# Patient Record
Sex: FEMALE | Race: WHITE | NOT HISPANIC OR LATINO | Employment: OTHER | URBAN - METROPOLITAN AREA
[De-identification: names, ages, dates, MRNs, and addresses within clinical notes are randomized per-mention and may not be internally consistent; named-entity substitution may affect disease eponyms.]

---

## 2017-10-11 ENCOUNTER — TRANSFERRED RECORDS (OUTPATIENT)
Dept: HEALTH INFORMATION MANAGEMENT | Facility: CLINIC | Age: 82
End: 2017-10-11

## 2018-01-02 ENCOUNTER — HOSPITAL LABORATORY (OUTPATIENT)
Dept: OTHER | Facility: CLINIC | Age: 83
End: 2018-01-02

## 2018-01-02 LAB — HGB BLD-MCNC: 14.1 G/DL (ref 11.7–15.7)

## 2018-01-05 ENCOUNTER — TRANSFERRED RECORDS (OUTPATIENT)
Dept: HEALTH INFORMATION MANAGEMENT | Facility: CLINIC | Age: 83
End: 2018-01-05

## 2018-01-08 ENCOUNTER — HOSPITAL ENCOUNTER (OUTPATIENT)
Dept: LAB | Facility: CLINIC | Age: 83
End: 2018-01-08
Attending: ORTHOPAEDIC SURGERY
Payer: MEDICARE

## 2018-01-30 ENCOUNTER — HOSPITAL ENCOUNTER (INPATIENT)
Facility: CLINIC | Age: 83
LOS: 3 days | Discharge: NURSING, REHAB OR LONG-TERM CARE | DRG: 470 | End: 2018-02-02
Attending: ORTHOPAEDIC SURGERY | Admitting: ORTHOPAEDIC SURGERY
Payer: MEDICARE

## 2018-01-30 ENCOUNTER — APPOINTMENT (OUTPATIENT)
Dept: PHYSICAL THERAPY | Facility: CLINIC | Age: 83
DRG: 470 | End: 2018-01-30
Attending: ORTHOPAEDIC SURGERY
Payer: MEDICARE

## 2018-01-30 ENCOUNTER — ANESTHESIA (OUTPATIENT)
Dept: SURGERY | Facility: CLINIC | Age: 83
DRG: 470 | End: 2018-01-30
Payer: MEDICARE

## 2018-01-30 ENCOUNTER — ANESTHESIA EVENT (OUTPATIENT)
Dept: SURGERY | Facility: CLINIC | Age: 83
DRG: 470 | End: 2018-01-30
Payer: MEDICARE

## 2018-01-30 DIAGNOSIS — Z96.652 STATUS POST TOTAL LEFT KNEE REPLACEMENT: Primary | ICD-10-CM

## 2018-01-30 PROBLEM — Z96.659 TOTAL KNEE REPLACEMENT STATUS: Status: ACTIVE | Noted: 2018-01-30

## 2018-01-30 LAB
CREAT SERPL-MCNC: 0.65 MG/DL (ref 0.52–1.04)
GFR SERPL CREATININE-BSD FRML MDRD: 86 ML/MIN/1.7M2
POTASSIUM SERPL-SCNC: 3.4 MMOL/L (ref 3.4–5.3)

## 2018-01-30 PROCEDURE — 36415 COLL VENOUS BLD VENIPUNCTURE: CPT | Performed by: SURGERY

## 2018-01-30 PROCEDURE — 27810169 ZZH OR IMPLANT GENERAL: Performed by: ORTHOPAEDIC SURGERY

## 2018-01-30 PROCEDURE — 25000128 H RX IP 250 OP 636: Performed by: SURGERY

## 2018-01-30 PROCEDURE — 25000128 H RX IP 250 OP 636: Performed by: NURSE ANESTHETIST, CERTIFIED REGISTERED

## 2018-01-30 PROCEDURE — 25000566 ZZH SEVOFLURANE, EA 15 MIN: Performed by: ORTHOPAEDIC SURGERY

## 2018-01-30 PROCEDURE — 25800025 ZZH RX 258: Performed by: ORTHOPAEDIC SURGERY

## 2018-01-30 PROCEDURE — 37000009 ZZH ANESTHESIA TECHNICAL FEE, EACH ADDTL 15 MIN: Performed by: ORTHOPAEDIC SURGERY

## 2018-01-30 PROCEDURE — 27210995 ZZH RX 272: Performed by: ORTHOPAEDIC SURGERY

## 2018-01-30 PROCEDURE — 71000013 ZZH RECOVERY PHASE 1 LEVEL 1 EA ADDTL HR: Performed by: ORTHOPAEDIC SURGERY

## 2018-01-30 PROCEDURE — 25000125 ZZHC RX 250: Performed by: SURGERY

## 2018-01-30 PROCEDURE — 97530 THERAPEUTIC ACTIVITIES: CPT | Mod: GP

## 2018-01-30 PROCEDURE — 97161 PT EVAL LOW COMPLEX 20 MIN: CPT | Mod: GP

## 2018-01-30 PROCEDURE — 27110028 ZZH OR GENERAL SUPPLY NON-STERILE: Performed by: ORTHOPAEDIC SURGERY

## 2018-01-30 PROCEDURE — 37000008 ZZH ANESTHESIA TECHNICAL FEE, 1ST 30 MIN: Performed by: ORTHOPAEDIC SURGERY

## 2018-01-30 PROCEDURE — 27210794 ZZH OR GENERAL SUPPLY STERILE: Performed by: ORTHOPAEDIC SURGERY

## 2018-01-30 PROCEDURE — 25000125 ZZHC RX 250: Performed by: NURSE ANESTHETIST, CERTIFIED REGISTERED

## 2018-01-30 PROCEDURE — 40000193 ZZH STATISTIC PT WARD VISIT

## 2018-01-30 PROCEDURE — 25000125 ZZHC RX 250: Performed by: ORTHOPAEDIC SURGERY

## 2018-01-30 PROCEDURE — 25000128 H RX IP 250 OP 636: Performed by: ORTHOPAEDIC SURGERY

## 2018-01-30 PROCEDURE — 27210995 ZZH RX 272: Performed by: PHYSICIAN ASSISTANT

## 2018-01-30 PROCEDURE — 36000093 ZZH SURGERY LEVEL 4 1ST 30 MIN: Performed by: ORTHOPAEDIC SURGERY

## 2018-01-30 PROCEDURE — 0SRD0J9 REPLACEMENT OF LEFT KNEE JOINT WITH SYNTHETIC SUBSTITUTE, CEMENTED, OPEN APPROACH: ICD-10-PCS | Performed by: ORTHOPAEDIC SURGERY

## 2018-01-30 PROCEDURE — 84132 ASSAY OF SERUM POTASSIUM: CPT | Performed by: SURGERY

## 2018-01-30 PROCEDURE — 25000128 H RX IP 250 OP 636: Performed by: ANESTHESIOLOGY

## 2018-01-30 PROCEDURE — C1776 JOINT DEVICE (IMPLANTABLE): HCPCS | Performed by: ORTHOPAEDIC SURGERY

## 2018-01-30 PROCEDURE — 25000128 H RX IP 250 OP 636: Performed by: PHYSICIAN ASSISTANT

## 2018-01-30 PROCEDURE — 25000132 ZZH RX MED GY IP 250 OP 250 PS 637: Mod: GY | Performed by: PHYSICIAN ASSISTANT

## 2018-01-30 PROCEDURE — 82565 ASSAY OF CREATININE: CPT | Performed by: SURGERY

## 2018-01-30 PROCEDURE — 36000063 ZZH SURGERY LEVEL 4 EA 15 ADDTL MIN: Performed by: ORTHOPAEDIC SURGERY

## 2018-01-30 PROCEDURE — A9270 NON-COVERED ITEM OR SERVICE: HCPCS | Mod: GY | Performed by: PHYSICIAN ASSISTANT

## 2018-01-30 PROCEDURE — 40000170 ZZH STATISTIC PRE-PROCEDURE ASSESSMENT II: Performed by: ORTHOPAEDIC SURGERY

## 2018-01-30 PROCEDURE — 97110 THERAPEUTIC EXERCISES: CPT | Mod: GP

## 2018-01-30 PROCEDURE — 71000012 ZZH RECOVERY PHASE 1 LEVEL 1 FIRST HR: Performed by: ORTHOPAEDIC SURGERY

## 2018-01-30 PROCEDURE — P9041 ALBUMIN (HUMAN),5%, 50ML: HCPCS | Performed by: NURSE ANESTHETIST, CERTIFIED REGISTERED

## 2018-01-30 PROCEDURE — 12000007 ZZH R&B INTERMEDIATE

## 2018-01-30 DEVICE — BONE CEMENT SIMPLEX W/TOBRAMYCIN 6197-9-001: Type: IMPLANTABLE DEVICE | Site: KNEE | Status: FUNCTIONAL

## 2018-01-30 DEVICE — BONE CEMENT SIMPLEX 1/2 DOSE 6188-1-001: Type: IMPLANTABLE DEVICE | Site: KNEE | Status: FUNCTIONAL

## 2018-01-30 DEVICE — IMPLANTABLE DEVICE: Type: IMPLANTABLE DEVICE | Site: KNEE | Status: FUNCTIONAL

## 2018-01-30 DEVICE — IMP TIB BASE JJ ATTUNE RP SZ4 CEM 150610004: Type: IMPLANTABLE DEVICE | Site: KNEE | Status: FUNCTIONAL

## 2018-01-30 RX ORDER — SODIUM CHLORIDE, SODIUM LACTATE, POTASSIUM CHLORIDE, CALCIUM CHLORIDE 600; 310; 30; 20 MG/100ML; MG/100ML; MG/100ML; MG/100ML
INJECTION, SOLUTION INTRAVENOUS CONTINUOUS
Status: CANCELLED | OUTPATIENT
Start: 2018-01-30

## 2018-01-30 RX ORDER — LIDOCAINE HYDROCHLORIDE 20 MG/ML
INJECTION, SOLUTION INFILTRATION; PERINEURAL PRN
Status: DISCONTINUED | OUTPATIENT
Start: 2018-01-30 | End: 2018-01-30

## 2018-01-30 RX ORDER — SODIUM CHLORIDE, SODIUM LACTATE, POTASSIUM CHLORIDE, CALCIUM CHLORIDE 600; 310; 30; 20 MG/100ML; MG/100ML; MG/100ML; MG/100ML
INJECTION, SOLUTION INTRAVENOUS CONTINUOUS
Status: DISCONTINUED | OUTPATIENT
Start: 2018-01-30 | End: 2018-01-30 | Stop reason: HOSPADM

## 2018-01-30 RX ORDER — CEFAZOLIN SODIUM 1 G
1 VIAL (EA) INJECTION EVERY 8 HOURS
Status: COMPLETED | OUTPATIENT
Start: 2018-01-30 | End: 2018-01-31

## 2018-01-30 RX ORDER — ACETAMINOPHEN 325 MG/1
650 TABLET ORAL EVERY 4 HOURS PRN
Status: DISCONTINUED | OUTPATIENT
Start: 2018-02-02 | End: 2018-02-02 | Stop reason: HOSPADM

## 2018-01-30 RX ORDER — ONDANSETRON 2 MG/ML
4 INJECTION INTRAMUSCULAR; INTRAVENOUS EVERY 30 MIN PRN
Status: CANCELLED | OUTPATIENT
Start: 2018-01-30

## 2018-01-30 RX ORDER — CEFAZOLIN SODIUM 1 G
1 VIAL (EA) INJECTION SEE ADMIN INSTRUCTIONS
Status: DISCONTINUED | OUTPATIENT
Start: 2018-01-30 | End: 2018-01-30 | Stop reason: HOSPADM

## 2018-01-30 RX ORDER — FENTANYL CITRATE 50 UG/ML
25-50 INJECTION, SOLUTION INTRAMUSCULAR; INTRAVENOUS
Status: CANCELLED | OUTPATIENT
Start: 2018-01-30

## 2018-01-30 RX ORDER — ACETAMINOPHEN 325 MG/1
975 TABLET ORAL EVERY 8 HOURS
Status: DISPENSED | OUTPATIENT
Start: 2018-01-30 | End: 2018-02-02

## 2018-01-30 RX ORDER — DEXAMETHASONE SODIUM PHOSPHATE 4 MG/ML
INJECTION, SOLUTION INTRA-ARTICULAR; INTRALESIONAL; INTRAMUSCULAR; INTRAVENOUS; SOFT TISSUE PRN
Status: DISCONTINUED | OUTPATIENT
Start: 2018-01-30 | End: 2018-01-30

## 2018-01-30 RX ORDER — NALOXONE HYDROCHLORIDE 0.4 MG/ML
.1-.4 INJECTION, SOLUTION INTRAMUSCULAR; INTRAVENOUS; SUBCUTANEOUS
Status: DISCONTINUED | OUTPATIENT
Start: 2018-01-30 | End: 2018-01-30

## 2018-01-30 RX ORDER — FENTANYL CITRATE 50 UG/ML
50 INJECTION, SOLUTION INTRAMUSCULAR; INTRAVENOUS
Status: COMPLETED | OUTPATIENT
Start: 2018-01-30 | End: 2018-01-30

## 2018-01-30 RX ORDER — MAGNESIUM HYDROXIDE 1200 MG/15ML
LIQUID ORAL PRN
Status: DISCONTINUED | OUTPATIENT
Start: 2018-01-30 | End: 2018-01-30 | Stop reason: HOSPADM

## 2018-01-30 RX ORDER — PROPOFOL 10 MG/ML
INJECTION, EMULSION INTRAVENOUS CONTINUOUS PRN
Status: DISCONTINUED | OUTPATIENT
Start: 2018-01-30 | End: 2018-01-30

## 2018-01-30 RX ORDER — VALSARTAN AND HYDROCHLOROTHIAZIDE 80; 12.5 MG/1; MG/1
1 TABLET, FILM COATED ORAL DAILY
Status: DISCONTINUED | OUTPATIENT
Start: 2018-01-30 | End: 2018-02-02 | Stop reason: HOSPADM

## 2018-01-30 RX ORDER — HYDROMORPHONE HYDROCHLORIDE 1 MG/ML
.3-.5 INJECTION, SOLUTION INTRAMUSCULAR; INTRAVENOUS; SUBCUTANEOUS
Status: DISCONTINUED | OUTPATIENT
Start: 2018-01-30 | End: 2018-02-02 | Stop reason: HOSPADM

## 2018-01-30 RX ORDER — LIDOCAINE 40 MG/G
CREAM TOPICAL
Status: DISCONTINUED | OUTPATIENT
Start: 2018-01-30 | End: 2018-02-02 | Stop reason: HOSPADM

## 2018-01-30 RX ORDER — ONDANSETRON 4 MG/1
4 TABLET, ORALLY DISINTEGRATING ORAL EVERY 6 HOURS PRN
Status: DISCONTINUED | OUTPATIENT
Start: 2018-01-30 | End: 2018-02-02 | Stop reason: HOSPADM

## 2018-01-30 RX ORDER — ONDANSETRON 2 MG/ML
INJECTION INTRAMUSCULAR; INTRAVENOUS PRN
Status: DISCONTINUED | OUTPATIENT
Start: 2018-01-30 | End: 2018-01-30

## 2018-01-30 RX ORDER — ATORVASTATIN CALCIUM 10 MG/1
10 TABLET, FILM COATED ORAL DAILY
Status: DISCONTINUED | OUTPATIENT
Start: 2018-01-30 | End: 2018-01-30

## 2018-01-30 RX ORDER — OXYCODONE HYDROCHLORIDE 5 MG/1
5-10 TABLET ORAL EVERY 4 HOURS PRN
Status: DISCONTINUED | OUTPATIENT
Start: 2018-01-30 | End: 2018-02-02 | Stop reason: HOSPADM

## 2018-01-30 RX ORDER — PROPOFOL 10 MG/ML
INJECTION, EMULSION INTRAVENOUS PRN
Status: DISCONTINUED | OUTPATIENT
Start: 2018-01-30 | End: 2018-01-30

## 2018-01-30 RX ORDER — ATORVASTATIN CALCIUM 10 MG/1
10 TABLET, FILM COATED ORAL DAILY
Status: DISCONTINUED | OUTPATIENT
Start: 2018-01-30 | End: 2018-02-02 | Stop reason: HOSPADM

## 2018-01-30 RX ORDER — ONDANSETRON 2 MG/ML
4 INJECTION INTRAMUSCULAR; INTRAVENOUS EVERY 6 HOURS PRN
Status: DISCONTINUED | OUTPATIENT
Start: 2018-01-30 | End: 2018-02-02 | Stop reason: HOSPADM

## 2018-01-30 RX ORDER — EPHEDRINE SULFATE 50 MG/ML
INJECTION, SOLUTION INTRAMUSCULAR; INTRAVENOUS; SUBCUTANEOUS PRN
Status: DISCONTINUED | OUTPATIENT
Start: 2018-01-30 | End: 2018-01-30

## 2018-01-30 RX ORDER — HYDROXYZINE HYDROCHLORIDE 10 MG/1
10 TABLET, FILM COATED ORAL EVERY 6 HOURS PRN
Status: DISCONTINUED | OUTPATIENT
Start: 2018-01-30 | End: 2018-02-02 | Stop reason: HOSPADM

## 2018-01-30 RX ORDER — ONDANSETRON 4 MG/1
4 TABLET, ORALLY DISINTEGRATING ORAL EVERY 30 MIN PRN
Status: CANCELLED | OUTPATIENT
Start: 2018-01-30

## 2018-01-30 RX ORDER — FENTANYL CITRATE 50 UG/ML
INJECTION, SOLUTION INTRAMUSCULAR; INTRAVENOUS PRN
Status: DISCONTINUED | OUTPATIENT
Start: 2018-01-30 | End: 2018-01-30

## 2018-01-30 RX ORDER — AMOXICILLIN 250 MG
1-2 CAPSULE ORAL 2 TIMES DAILY
Status: DISCONTINUED | OUTPATIENT
Start: 2018-01-30 | End: 2018-02-02 | Stop reason: HOSPADM

## 2018-01-30 RX ORDER — ZOLPIDEM TARTRATE 5 MG/1
5 TABLET ORAL
Status: DISCONTINUED | OUTPATIENT
Start: 2018-01-30 | End: 2018-02-02 | Stop reason: HOSPADM

## 2018-01-30 RX ORDER — NALOXONE HYDROCHLORIDE 0.4 MG/ML
.1-.4 INJECTION, SOLUTION INTRAMUSCULAR; INTRAVENOUS; SUBCUTANEOUS
Status: DISCONTINUED | OUTPATIENT
Start: 2018-01-30 | End: 2018-02-02 | Stop reason: HOSPADM

## 2018-01-30 RX ORDER — ALBUMIN, HUMAN INJ 5% 5 %
SOLUTION INTRAVENOUS CONTINUOUS PRN
Status: DISCONTINUED | OUTPATIENT
Start: 2018-01-30 | End: 2018-01-30

## 2018-01-30 RX ORDER — HYDROMORPHONE HYDROCHLORIDE 1 MG/ML
.3-.5 INJECTION, SOLUTION INTRAMUSCULAR; INTRAVENOUS; SUBCUTANEOUS EVERY 5 MIN PRN
Status: CANCELLED | OUTPATIENT
Start: 2018-01-30

## 2018-01-30 RX ORDER — SODIUM CHLORIDE, SODIUM LACTATE, POTASSIUM CHLORIDE, CALCIUM CHLORIDE 600; 310; 30; 20 MG/100ML; MG/100ML; MG/100ML; MG/100ML
INJECTION, SOLUTION INTRAVENOUS CONTINUOUS
Status: DISCONTINUED | OUTPATIENT
Start: 2018-01-30 | End: 2018-02-02 | Stop reason: HOSPADM

## 2018-01-30 RX ADMIN — PHENYLEPHRINE HYDROCHLORIDE 100 MCG: 10 INJECTION INTRAVENOUS at 08:55

## 2018-01-30 RX ADMIN — PHENYLEPHRINE HYDROCHLORIDE 100 MCG: 10 INJECTION INTRAVENOUS at 08:09

## 2018-01-30 RX ADMIN — HYDROXYZINE HYDROCHLORIDE 10 MG: 10 TABLET ORAL at 18:32

## 2018-01-30 RX ADMIN — SODIUM CHLORIDE, POTASSIUM CHLORIDE, SODIUM LACTATE AND CALCIUM CHLORIDE: 600; 310; 30; 20 INJECTION, SOLUTION INTRAVENOUS at 07:04

## 2018-01-30 RX ADMIN — LIDOCAINE HYDROCHLORIDE 100 MG: 20 INJECTION, SOLUTION INFILTRATION; PERINEURAL at 07:40

## 2018-01-30 RX ADMIN — PROPOFOL 25 MCG/KG/MIN: 10 INJECTION, EMULSION INTRAVENOUS at 07:37

## 2018-01-30 RX ADMIN — FENTANYL CITRATE 50 MCG: 50 INJECTION, SOLUTION INTRAMUSCULAR; INTRAVENOUS at 08:13

## 2018-01-30 RX ADMIN — FENTANYL CITRATE 50 MCG: 50 INJECTION, SOLUTION INTRAMUSCULAR; INTRAVENOUS at 07:06

## 2018-01-30 RX ADMIN — PHENYLEPHRINE HYDROCHLORIDE 100 MCG: 10 INJECTION INTRAVENOUS at 08:05

## 2018-01-30 RX ADMIN — CEFAZOLIN SODIUM 1 G: 10 INJECTION, POWDER, FOR SOLUTION INTRAVENOUS at 17:29

## 2018-01-30 RX ADMIN — PROPOFOL 170 MG: 10 INJECTION, EMULSION INTRAVENOUS at 07:40

## 2018-01-30 RX ADMIN — ASPIRIN 325 MG: 325 TABLET, DELAYED RELEASE ORAL at 20:24

## 2018-01-30 RX ADMIN — LIDOCAINE HYDROCHLORIDE 0.2 ML: 10 INJECTION, SOLUTION EPIDURAL; INFILTRATION; INTRACAUDAL; PERINEURAL at 07:05

## 2018-01-30 RX ADMIN — FENTANYL CITRATE 25 MCG: 50 INJECTION, SOLUTION INTRAMUSCULAR; INTRAVENOUS at 09:47

## 2018-01-30 RX ADMIN — SENNOSIDES AND DOCUSATE SODIUM 1 TABLET: 8.6; 5 TABLET ORAL at 20:24

## 2018-01-30 RX ADMIN — PHENYLEPHRINE HYDROCHLORIDE 100 MCG: 10 INJECTION INTRAVENOUS at 07:47

## 2018-01-30 RX ADMIN — PHENYLEPHRINE HYDROCHLORIDE 100 MCG: 10 INJECTION INTRAVENOUS at 07:54

## 2018-01-30 RX ADMIN — PHENYLEPHRINE HYDROCHLORIDE 100 MCG: 10 INJECTION INTRAVENOUS at 07:43

## 2018-01-30 RX ADMIN — PHENYLEPHRINE HYDROCHLORIDE 100 MCG: 10 INJECTION INTRAVENOUS at 08:20

## 2018-01-30 RX ADMIN — DEXAMETHASONE SODIUM PHOSPHATE 4 MG: 4 INJECTION, SOLUTION INTRA-ARTICULAR; INTRALESIONAL; INTRAMUSCULAR; INTRAVENOUS; SOFT TISSUE at 08:11

## 2018-01-30 RX ADMIN — TRANEXAMIC ACID 1 G: 100 INJECTION, SOLUTION INTRAVENOUS at 07:51

## 2018-01-30 RX ADMIN — PHENYLEPHRINE HYDROCHLORIDE 100 MCG: 10 INJECTION INTRAVENOUS at 08:12

## 2018-01-30 RX ADMIN — MIDAZOLAM 1 MG: 1 INJECTION INTRAMUSCULAR; INTRAVENOUS at 07:26

## 2018-01-30 RX ADMIN — ACETAMINOPHEN 975 MG: 325 TABLET, FILM COATED ORAL at 17:30

## 2018-01-30 RX ADMIN — Medication 5 MG: at 07:45

## 2018-01-30 RX ADMIN — ALBUMIN (HUMAN): 12.5 SOLUTION INTRAVENOUS at 07:49

## 2018-01-30 RX ADMIN — HYDROMORPHONE HYDROCHLORIDE 0.5 MG: 1 INJECTION, SOLUTION INTRAMUSCULAR; INTRAVENOUS; SUBCUTANEOUS at 18:32

## 2018-01-30 RX ADMIN — TRANEXAMIC ACID 1 G: 100 INJECTION, SOLUTION INTRAVENOUS at 09:35

## 2018-01-30 RX ADMIN — ONDANSETRON 4 MG: 2 INJECTION INTRAMUSCULAR; INTRAVENOUS at 09:36

## 2018-01-30 RX ADMIN — Medication 10 MG: at 07:59

## 2018-01-30 RX ADMIN — FENTANYL CITRATE 100 MCG: 50 INJECTION, SOLUTION INTRAMUSCULAR; INTRAVENOUS at 07:40

## 2018-01-30 RX ADMIN — CEFAZOLIN SODIUM 2 G: 2 SOLUTION INTRAVENOUS at 07:48

## 2018-01-30 RX ADMIN — FENTANYL CITRATE 50 MCG: 50 INJECTION, SOLUTION INTRAMUSCULAR; INTRAVENOUS at 08:27

## 2018-01-30 RX ADMIN — DEXMEDETOMIDINE HYDROCHLORIDE 8 MCG: 100 INJECTION, SOLUTION INTRAVENOUS at 07:37

## 2018-01-30 RX ADMIN — MIDAZOLAM HYDROCHLORIDE 1 MG: 1 INJECTION, SOLUTION INTRAMUSCULAR; INTRAVENOUS at 07:06

## 2018-01-30 RX ADMIN — SODIUM CHLORIDE, POTASSIUM CHLORIDE, SODIUM LACTATE AND CALCIUM CHLORIDE: 600; 310; 30; 20 INJECTION, SOLUTION INTRAVENOUS at 08:29

## 2018-01-30 RX ADMIN — OXYCODONE HYDROCHLORIDE 5 MG: 5 TABLET ORAL at 23:24

## 2018-01-30 RX ADMIN — CEFAZOLIN SODIUM 1 G: 2 SOLUTION INTRAVENOUS at 09:43

## 2018-01-30 RX ADMIN — HYDROMORPHONE HYDROCHLORIDE 0.5 MG: 1 INJECTION, SOLUTION INTRAMUSCULAR; INTRAVENOUS; SUBCUTANEOUS at 08:20

## 2018-01-30 RX ADMIN — SODIUM CHLORIDE, POTASSIUM CHLORIDE, SODIUM LACTATE AND CALCIUM CHLORIDE: 600; 310; 30; 20 INJECTION, SOLUTION INTRAVENOUS at 10:09

## 2018-01-30 ASSESSMENT — LIFESTYLE VARIABLES: TOBACCO_USE: 1

## 2018-01-30 NOTE — IP AVS SNAPSHOT
` `           [place] [address] [facility]: [phone]                 INTERAGENCY TRANSFER FORM - NOTES (H&P, Discharge Summary, Consults, Procedures, Therapies)   2018                    Hospital Admission Date: 2018  MARVA JONES   : 1934  Sex: Female        Patient PCP Information     Provider PCP Type    Vel Calvert MD General         History & Physicals      H&P signed by Patricia Austin at 2018  8:04 AM      Author:  Patricia Austin Service:  (none) Author Type:  Physician    Filed:  2018  8:04 AM Date of Service:  2018  6:58 AM Creation Time:  2018  8:04 AM    Status:  Signed :  Particia Austin (Physician)     Scan on 2018  8:04 AM by Norman, Provider : [facility] 2018 1          Revision History        User Key Date/Time User Provider Type Action    > [N/A] 2018  8:04 AM Norman, Provider Physician Sign                  Discharge Summaries     No notes of this type exist for this encounter.      Consult Notes     No notes of this type exist for this encounter.         Progress Notes - Physician (Notes from 18 through 18)      Progress Notes by Lynnette Olmos MSW at 2018 11:15 AM     Author:  Lynnette Olmos MSW Service:  (none) Author Type:      Filed:  2018 11:16 AM Date of Service:  2018 11:15 AM Creation Time:  2018 11:15 AM    Status:  Signed :  Lynnette Olmos MSW ()         D: Discharge orders received and faxed to [facility]. Facility has bed availability for today. Patient will be transported via [facility] at 1200. Patient and family are in agreement with this plan.    PAS-RR    D: Per DHS regulation, KYLE completed and submitted PAS-RR to [place] [employer] Direct Connect via the [email or web address] Line.  PAS-RR confirmation # is : [id number]    I: KYLE spoke with patient and family and they are aware a PAS-RR has been submitted.  KYLE reviewed with patient  "and family that they may be contacted for a follow up appointment within 10 days of hospital discharge if their SNF stay is < 30 days.  Contact information for Senior LinkAge Line was also provided.    A: patient and family verbalized understanding.    P: Further questions may be directed to Senior LinkAge Line at #[phone], option #4 for [facility]-RR staff.[NO1.1]       Revision History        User Key Date/Time User Provider Type Action    > NO1.1 2018 11:16 AM Lynnette Olmos MSW  Sign            Progress Notes by Diana Mcclellan MD at 2018  7:15 AM     Author:  Diana Mcclellan MD Service:  Orthopedics Author Type:  Physician    Filed:  2018  7:17 AM Date of Service:  2018  7:15 AM Creation Time:  2018  7:15 AM    Status:  Signed :  Diana Mcclellan MD (Physician)         Gely Tam  2018  POD # 3sptka    Admit Date:  2018      Doing well.  Clean wound without signs of infection.  Normal healing wound.  No immediate surgical complications identified.  No excessive bleeding  Pain well-controlled.  Tolerating physical therapy and rehabilitation well.  Calf nontender b le.     Objective:  Blood pressure 98/56, pulse 72, temperature 98.2  F (36.8  C), temperature source Oral, resp. rate 19, height 1.651 m (5' 5\"), weight 62.6 kg (138 lb), SpO2 95 %.    Temperatures:  Current - Temp: 98.2  F (36.8  C); Max - Temp  Av.1  F (36.7  C)  Min: 98  F (36.7  C)  Max: 98.2  F (36.8  C)  Pulse range: Pulse  Av  Min: 72  Max: 82  Blood pressure range: Systolic (24hrs), Av , Min:95 , Max:104   ; Diastolic (24hrs), Av, Min:52, Max:60    Exam:  CMS: intact  alert, stable, wound ok  Calf nontender b le.       Labs:  Recent Labs   Lab Test  18   0645  10/28/12   0515  10/26/12   0545   POTASSIUM  3.4  3.4  3.5     Recent Labs   Lab Test  18   0640  18   0536  18   1036   HGB  11.0*  10.8*  14.1     Recent Labs   Lab Test  10/30/12   " "0658  10/29/12   0610  10/28/12   0515   INR  1.74*  2.10*  1.22*     Recent Labs   Lab Test  10/30/12   0658  10/29/12   0808  10/26/12   0545   PLT  166  143*  227       PLAN: Weight bearing as tolerated  Pain control measures  Dc today.[LV1.1]          Revision History        User Key Date/Time User Provider Type Action    > LV1.1 2018  7:17 AM Diana Mcclellan MD Physician Sign            Progress Notes by Kenyon Jimenez PA-C at 2018 11:43 AM     Author:  Kenyon Jimenez PA-C Service:  Orthopedics Author Type:  Physician Assistant - C    Filed:  2018 11:44 AM Date of Service:  2018 11:43 AM Creation Time:  2018 11:43 AM    Status:  Signed :  Kenyon Jimenez PA-C (Physician Assistant - C)         Gely JIMÉNEZ Anel  2018  POD # 2 s/p LTKA  Admit Date:  2018      Doing well.  Objective: patient doing well. No chest pain or shortness of breath. Ready to work with therapy and plans on going to rehab facility.   Blood pressure 119/70, pulse 72, temperature 98.2  F (36.8  C), temperature source Oral, resp. rate 16, height 1.651 m (5' 5\"), weight 62.6 kg (138 lb), SpO2 95 %.    Temperatures:  Current - Temp: 98.2  F (36.8  C); Max - Temp  Av.9  F (36.6  C)  Min: 97.6  F (36.4  C)  Max: 98.2  F (36.8  C)  Pulse range: Pulse  Av  Min: 72  Max: 72  Blood pressure range: Systolic (24hrs), Av , Min:95 , Max:119   ; Diastolic (24hrs), Av, Min:50, Max:73    Exam:  CMS: intact  alert, stable, wound ok  Dressing c/d/i  Calf s/nt  NVID in the LLE  Communicates clearly with examiner    Labs:  Recent Labs   Lab Test  18   0645  10/28/12   0515  10/26/12   0545   POTASSIUM  3.4  3.4  3.5     Recent Labs   Lab Test  18   0640  18   0536  18   1036   HGB  11.0*  10.8*  14.1     Recent Labs   Lab Test  10/30/12   0658  10/29/12   0610  10/28/12   0515   INR  1.74*  2.10*  1.22*     Recent Labs   Lab Test  10/30/12   0658  10/29/12   0808  " 10/26/12   0545   PLT  166  143*  227       PLAN: patient will discharge to TCU. Continue working with therapy and continue taking aspirin for DVT ppx.   We will continue to follow closely.[NH1.1]        Revision History        User Key Date/Time User Provider Type Action    > NH1.1 2/1/2018 11:44 AM Kenyon Jimenez PA-C Physician Assistant - NADINE Sign            Progress Notes by Lynnette Olmos MSW at 2/1/2018 11:38 AM     Author:  Lynnette Olmos MSW Service:  (none) Author Type:      Filed:  2/1/2018 11:43 AM Date of Service:  2/1/2018 11:38 AM Creation Time:  2/1/2018 11:38 AM    Status:  Signed :  Lynnette Olmos MSW ()         Care Transition Initial Assessment - KYLE  Reason For Consult: discharge planning  Met with: Patient    Active Problems:    Total knee replacement status         DATA  Lives With: alone  Living Arrangements: house    Identified issues/concerns regarding health management: SW was consulted for discharge planning. Patient is Gely, an 83 year-old female who was admitted on 1/30 for a total knee replacement. Her tentative discharge date is 2/2. SW met with patient and reviewed medical record. Per PT marleen, prior to this admission, patient lived alone in a house with 3 stairs to enter and 1 stair within. She used a rolling walker, a straight cane, and a raised toilet. She was otherwise independent with ADLs. Per MD, the recommendation is TCU. Patient is requesting a private room at [facility] or [facility]. She verbalized understanding of private room fees. SW placed this referral via [facility].        Transportation Available: car    ASSESSMENT  Cognitive Status:  awake and alert  Concerns to be addressed: Discharge planning.     PLAN  Financial costs for the patient includes Private room fees.  Patient given options and choices for discharge TCU choices.  Patient/family is agreeable to the plan?  Yes  Patient Goals and Preferences: Discharge to [facility].  Patient  "anticipates discharging to:  TCU.[NO1.1]    Lynnette Olmos[NO1.2], MARIIA, LICSW[NO1.1]             Revision History        User Key Date/Time User Provider Type Action    > NO1.2 2018 11:43 AM Lynnette Olmos MSW  Sign     NO1.1 2018 11:38 AM Lynnette Olmos MSW              Progress Notes by Diana Mcclellan MD at 2018  7:18 AM     Author:  Diana Mcclellan MD Service:  Orthopedics Author Type:  Physician    Filed:  2018  7:20 AM Date of Service:  2018  7:18 AM Creation Time:  2018  7:18 AM    Status:  Signed :  Diana Mcclellan MD (Physician)         Gely Tam  2018  POD # patient s/p LTKA day 1  Admit Date:  2018      Doing well.  Objective: patient doing well overall. States she is very \" stiff\" in her knee and is ready to get up and move with therapy. Will plan on discharge to a TCU later this week.   Blood pressure 107/54, temperature 97.7  F (36.5  C), temperature source Oral, resp. rate 22, height 1.651 m (5' 5\"), weight 62.6 kg (138 lb), SpO2 95 %.    Temperatures:  Current - Temp: 97.7  F (36.5  C); Max - Temp  Av.4  F (36.3  C)  Min: 96.7  F (35.9  C)  Max: 97.9  F (36.6  C)  Pulse range: No Data Recorded  Blood pressure range: Systolic (24hrs), Av , Min:88 , Max:120   ; Diastolic (24hrs), Av, Min:51, Max:68    Exam:  CMS: intact  alert, stable, wound ok  Dressing c/d/i  Calf s/nt  NVID in the LLE   DF/PF   Communicates clearly with examiner    Labs:  Recent Labs   Lab Test  18   0645  10/28/12   0515  10/26/12   0545   POTASSIUM  3.4  3.4  3.5     Recent Labs   Lab Test  18   0536  18   1036  10/30/12   0658   HGB  10.8*  14.1  9.5*     Recent Labs   Lab Test  10/30/12   0658  10/29/12   0610  10/28/12   0515   INR  1.74*  2.10*  1.22*     Recent Labs   Lab Test  10/30/12   0658  10/29/12   0808  10/26/12   0545   PLT  166  143*  227       PLAN: patient will plan to discharge to rehab " facility later this week.   She will begin working with therapy this morning.   No further complaints and doing well overall.[LV1.1]        Revision History        User Key Date/Time User Provider Type Action    > LV1.1 1/31/2018  7:20 AM Diana Mcclellan MD Physician Sign            Progress Notes by Ashley Rivas PT at 1/30/2018  3:50 PM     Author:  Ashley Rivas PT Service:  (none) Author Type:  Physical Therapist    Filed:  1/30/2018  3:50 PM Date of Service:  1/30/2018  3:50 PM Creation Time:  1/30/2018  3:50 PM    Status:  Signed :  Ashley Rivas PT (Physical Therapist)            01/30/18 1500   Quick Adds   Type of Visit Initial PT Evaluation   Living Environment   Lives With alone   Living Arrangements house   Home Accessibility (Walkin shower)   Number of Stairs to Enter Home 3  (one rail)   Number of Stairs Within Home 1  (to living room)   Transportation Available car   Self-Care   Usual Activity Tolerance good   Current Activity Tolerance fair   Regular Exercise no   Equipment Currently Used at Home walker, rolling;cane, straight;raised toilet   Functional Level Prior   Ambulation 0-->independent   Transferring 0-->independent   Toileting 0-->independent   Bathing 0-->independent   Dressing 0-->independent   Fall history within last six months no   General Information   Onset of Illness/Injury or Date of Surgery - Date 01/30/18   Referring Physician Tony FIGUEROA   Patient/Family Goals Statement To go to TCU   Pertinent History of Current Problem (include personal factors and/or comorbidities that impact the POC) 83 year old female s/p L TKA.    Precautions/Limitations fall precautions   Weight-Bearing Status - LLE weight-bearing as tolerated   Cognitive Status Examination   Orientation orientation to person, place and time   Level of Consciousness alert   Follows Commands and Answers Questions 100% of the time;able to follow single-step instructions   Pain Assessment   Patient Currently in  "Pain No   Range of Motion (ROM)   ROM Comment Decreased L LE AROM secondary to pain, weakness   Strength   Strength Comments Able to perform x10 SLR on the L LE   Bed Mobility   Bed Mobility Comments Supine to sit with SBA   Transfer Skills   Transfer Comments Sit to/from stand with CGA   Gait   Gait Comments Ambulates 5' with FWW and CGA   Balance   Balance Comments Requires bilatearl UE support on FWW at this time   Modality Interventions   Planned Modality Interventions Cryotherapy   Planned Modality Interventions Comments PRN   General Therapy Interventions   Planned Therapy Interventions bed mobility training;gait training;ROM;strengthening;transfer training;home program guidelines;progressive activity/exercise   Clinical Impression   Criteria for Skilled Therapeutic Intervention yes, treatment indicated   PT Diagnosis Impaired gait   Influenced by the following impairments Pain, decreased L LE AROM/strength, impaired balance   Functional limitations due to impairments Decreased IND with functional mobility   Clinical Presentation Stable/Uncomplicated   Clinical Presentation Rationale Medically stable   Clinical Decision Making (Complexity) Low complexity   Therapy Frequency` 2 times/day   Predicted Duration of Therapy Intervention (days/wks) 3 days   Anticipated Discharge Disposition (TBD pending further assessment of mobility)   Risk & Benefits of therapy have been explained Yes   Patient, Family & other staff in agreement with plan of care Yes   [facility] [employer]-PAC TM \"6 Clicks\"   2016, Trustees of [facility], under license to [employer].  All rights reserved.   6 Clicks Short Forms Basic Mobility Inpatient Short Form   [facility] AM-PAC  \"6 Clicks\" V.2 Basic Mobility Inpatient Short Form   1. Turning from your back to your side while in a flat bed without using bedrails? 4 - None   2. Moving from lying on your back to sitting on the side of a flat bed without using bedrails? 3 - A " Little   3. Moving to and from a bed to a chair (including a wheelchair)? 3 - A Little   4. Standing up from a chair using your arms (e.g., wheelchair, or bedside chair)? 3 - A Little   5. To walk in hospital room? 3 - A Little   6. Climbing 3-5 steps with a railing? 3 - A Little   Basic Mobility Raw Score (Score out of 24.Lower scores equate to lower levels of function) 19   Total Evaluation Time   Total Evaluation Time (Minutes) 10[BB1.1]        Revision History        User Key Date/Time User Provider Type Action    > BB1.1 1/30/2018  3:50 PM Ashley Rivas, PT Physical Therapist Sign            Progress Notes by Sabina Rhoades at 1/30/2018  5:36 AM     Author:  Sabina Rhoades Service:  (none) Author Type:  (none)    Filed:  1/30/2018  5:36 AM Date of Service:  1/30/2018  5:36 AM Creation Time:  1/30/2018  5:36 AM    Status:  Signed :  Sabina Rhoades         Admission medication history interview status for the 1/30/2018  admission is complete. See EPIC admission navigator for prior to admission medications     Medication history source reliability:Good    Medication history interview source(s):Patient    Medication history resources (including written lists, pill bottles, clinic record):Patient mailed in her medication list prior to surgery    Primary pharmacy.[facility]    Additional medication history information not noted on PTA med list :None    Time spent in this activity: 40 minutes    Prior to Admission medications    Medication Sig Last Dose Taking? Auth Provider   AMOXICILLIN PO Take 2,000 mg by mouth daily as needed (1 hour prior to dental procedures) Past Month at PRN Yes Reported, Patient   ZOLPIDEM TARTRATE PO Take 5 mg by mouth nightly as needed for sleep Past Month at HS Yes Reported, Patient   aspirin 81 MG chewable tablet Take 1 tablet (81 mg) by mouth daily 1/28/2018 at AM Yes Kelly Mejía MD   valsartan-hydrochlorothiazide (DIOVAN HCT) 80-12.5 MG per tablet Take 1 tablet by mouth  daily 1/28/2018 at AM Yes Kelly Mejía MD   Calcium Carbonate-Vit D-Min (CALCIUM 1200 PO) Take 2 tablets by mouth daily.   1/29/2018 at AM Yes Reported, Patient   atorvastatin (LIPITOR) 10 MG tablet Take 10 mg by mouth daily. 1/28/2018 at AM Yes Reported, Patient[AH1.1]            Revision History        User Key Date/Time User Provider Type Action    > AH1.1 1/30/2018  5:36 AM Sabina Rhoades (none) Sign                  Procedure Notes     No notes of this type exist for this encounter.         Progress Notes - Therapies (Notes from 01/30/18 through 02/02/18)      Progress Notes by Ashley Rivas PT at 1/30/2018  3:50 PM     Author:  Ashley Rivas PT Service:  (none) Author Type:  Physical Therapist    Filed:  1/30/2018  3:50 PM Date of Service:  1/30/2018  3:50 PM Creation Time:  1/30/2018  3:50 PM    Status:  Signed :  Ashley Rivas PT (Physical Therapist)            01/30/18 1500   Quick Adds   Type of Visit Initial PT Evaluation   Living Environment   Lives With alone   Living Arrangements house   Home Accessibility (Walkin shower)   Number of Stairs to Enter Home 3  (one rail)   Number of Stairs Within Home 1  (to living room)   Transportation Available car   Self-Care   Usual Activity Tolerance good   Current Activity Tolerance fair   Regular Exercise no   Equipment Currently Used at Home walker, rolling;cane, straight;raised toilet   Functional Level Prior   Ambulation 0-->independent   Transferring 0-->independent   Toileting 0-->independent   Bathing 0-->independent   Dressing 0-->independent   Fall history within last six months no   General Information   Onset of Illness/Injury or Date of Surgery - Date 01/30/18   Referring Physician Tony FIGUEROA   Patient/Family Goals Statement To go to TCU   Pertinent History of Current Problem (include personal factors and/or comorbidities that impact the POC) 83 year old female s/p L TKA.    Precautions/Limitations fall precautions   Weight-Bearing  "Status - LLE weight-bearing as tolerated   Cognitive Status Examination   Orientation orientation to person, place and time   Level of Consciousness alert   Follows Commands and Answers Questions 100% of the time;able to follow single-step instructions   Pain Assessment   Patient Currently in Pain No   Range of Motion (ROM)   ROM Comment Decreased L LE AROM secondary to pain, weakness   Strength   Strength Comments Able to perform x10 SLR on the L LE   Bed Mobility   Bed Mobility Comments Supine to sit with SBA   Transfer Skills   Transfer Comments Sit to/from stand with CGA   Gait   Gait Comments Ambulates 5' with FWW and CGA   Balance   Balance Comments Requires bilatearl UE support on FWW at this time   Modality Interventions   Planned Modality Interventions Cryotherapy   Planned Modality Interventions Comments PRN   General Therapy Interventions   Planned Therapy Interventions bed mobility training;gait training;ROM;strengthening;transfer training;home program guidelines;progressive activity/exercise   Clinical Impression   Criteria for Skilled Therapeutic Intervention yes, treatment indicated   PT Diagnosis Impaired gait   Influenced by the following impairments Pain, decreased L LE AROM/strength, impaired balance   Functional limitations due to impairments Decreased IND with functional mobility   Clinical Presentation Stable/Uncomplicated   Clinical Presentation Rationale Medically stable   Clinical Decision Making (Complexity) Low complexity   Therapy Frequency` 2 times/day   Predicted Duration of Therapy Intervention (days/wks) 3 days   Anticipated Discharge Disposition (TBD pending further assessment of mobility)   Risk & Benefits of therapy have been explained Yes   Patient, Family & other staff in agreement with plan of care Yes   [facility] AM-PAC TM \"6 Clicks\"   2016, Trustees of [facility], under license to [employer].  All rights reserved.   6 Clicks Short Forms Basic Mobility " "Inpatient Short Form   [facility] AM-PAC  \"6 Clicks\" V.2 Basic Mobility Inpatient Short Form   1. Turning from your back to your side while in a flat bed without using bedrails? 4 - None   2. Moving from lying on your back to sitting on the side of a flat bed without using bedrails? 3 - A Little   3. Moving to and from a bed to a chair (including a wheelchair)? 3 - A Little   4. Standing up from a chair using your arms (e.g., wheelchair, or bedside chair)? 3 - A Little   5. To walk in hospital room? 3 - A Little   6. Climbing 3-5 steps with a railing? 3 - A Little   Basic Mobility Raw Score (Score out of 24.Lower scores equate to lower levels of function) 19   Total Evaluation Time   Total Evaluation Time (Minutes) 10[BB1.1]        Revision History        User Key Date/Time User Provider Type Action    > BB1.1 1/30/2018  3:50 PM Ashley Rivas, PT Physical Therapist Sign            Progress Notes by Sabina Rhoades at 1/30/2018  5:36 AM     Author:  Sabina Rhoades Service:  (none) Author Type:  (none)    Filed:  1/30/2018  5:36 AM Date of Service:  1/30/2018  5:36 AM Creation Time:  1/30/2018  5:36 AM    Status:  Signed :  Sabina Rhoades         Admission medication history interview status for the 1/30/2018  admission is complete. See EPIC admission navigator for prior to admission medications     Medication history source reliability:Good    Medication history interview source(s):Patient    Medication history resources (including written lists, pill bottles, clinic record):Patient mailed in her medication list prior to surgery    Primary pharmacy.[facility]    Additional medication history information not noted on PTA med list :None    Time spent in this activity: 40 minutes    Prior to Admission medications    Medication Sig Last Dose Taking? Auth Provider   AMOXICILLIN PO Take 2,000 mg by mouth daily as needed (1 hour prior to dental procedures) Past Month at PRN Yes Reported, Patient   ZOLPIDEM " TARTRATE PO Take 5 mg by mouth nightly as needed for sleep Past Month at HS Yes Reported, Patient   aspirin 81 MG chewable tablet Take 1 tablet (81 mg) by mouth daily 1/28/2018 at AM Yes Kelly Mejía MD   valsartan-hydrochlorothiazide (DIOVAN HCT) 80-12.5 MG per tablet Take 1 tablet by mouth daily 1/28/2018 at AM Yes Kelly Mejía MD   Calcium Carbonate-Vit D-Min (CALCIUM 1200 PO) Take 2 tablets by mouth daily.   1/29/2018 at AM Yes Reported, Patient   atorvastatin (LIPITOR) 10 MG tablet Take 10 mg by mouth daily. 1/28/2018 at AM Yes Reported, Patient[AH1.1]            Revision History        User Key Date/Time User Provider Type Action    > AH1.1 1/30/2018  5:36 AM Sabina Rhoades (none) Sign

## 2018-01-30 NOTE — IP AVS SNAPSHOT
` [place] [address] [facility]: [phone]            Medication Administration Report for Gely Tam as of 02/02/18 1134   Legend:    Given Hold Not Given Due Canceled Entry Other Actions    Time Time (Time) Time  Time-Action       Inactive    Active    Linked        Medications 01/27/18 01/28/18 01/29/18 01/30/18 01/31/18 02/01/18 02/02/18    acetaminophen (TYLENOL) tablet 650 mg  Dose: 650 mg  Freq: EVERY 4 HOURS PRN Route: PO  PRN Reason: other  PRN Comment: surgical pain  Start: 02/02/18 0000   Admin Instructions: May give first dose 4 hours after last scheduled dose of acetaminophen.  Maximum acetaminophen dose from all sources = 75 mg/kg/day not to exceed 4 grams/day.                 Dose: 975 mg  Freq: EVERY 8 HOURS Route: PO  Start: 01/30/18 1215   End: 02/02/18 0959   Admin Instructions: Do not use if patient has an active opioid/acetaminophen analgesic order for pain  Maximum acetaminophen dose from all sources = 75 mg/kg/day not to exceed 4 grams/day.        (1218)-Not Given       1730 (975 mg)-Given               0123 (975 mg)-Given       1042 (975 mg)-Given       1740 (975 mg)-Given        0214 (975 mg)-Given       0913 (975 mg)-Given       1753 (975 mg)-Given        0132 (975 mg)-Given       0959-Med Discontinued       aspirin EC EC tablet 325 mg  Dose: 325 mg  Freq: 2 TIMES DAILY Route: PO  Start: 01/30/18 1215   Admin Instructions: DO NOT CRUSH.        (1218)-Not Given       2024 (325 mg)-Given        0829 (325 mg)-Given       2203 (325 mg)-Given        0828 (325 mg)-Given       2021 (325 mg)-Given        0811 (325 mg)-Given       [ ] 2100           atorvastatin (LIPITOR) tablet 10 mg  Dose: 10 mg  Freq: DAILY Route: PO  Start: 01/30/18 1230       (1342)-Not Given        0830 (10 mg)-Given        0828 (10 mg)-Given        0811 (10 mg)-Given           benzocaine-menthol (CHLORASEPTIC) 6-10 MG lozenge 1-2 lozenge  Dose: 1-2 lozenge  Freq: EVERY 1 HOUR PRN Route: BU  PRN  "Reason: sore throat  PRN Comment: sore throat without fever  Start: 01/30/18 1208              Calcium carb-Vitamin D 500 mg Cheesh-Na-200 units (OSCAL with D;Oyster Shell Calcium) per tablet 2 tablet  Dose: 2 tablet  Freq: DAILY Route: PO  Start: 01/30/18 1015       (1219)-Not Given        0830 (2 tablet)-Given        0827 (2 tablet)-Given        0811 (2 tablet)-Given           HYDROmorphone (PF) (DILAUDID) injection 0.3-0.5 mg  Dose: 0.3-0.5 mg  Freq: EVERY 2 HOURS PRN Route: IV  PRN Reason: other  PRN Comment: pain control or improvement in physical function. Hold dose for analgesic side effects.  Start: 01/30/18 1208   Admin Instructions: Start at the lowest dose.  May adjust dose by 0.1 mg every 2 hours as needed.   Notify provider to assess for uncontrolled pain or analgesic side effects.  Hold while on IV PCA or with regular IV opioid dosing.  For ordered doses up to 4 mg give IV Push undiluted. Administer each 2mg over 2-5 minutes.        1832 (0.5 mg)-Given              hydrOXYzine (ATARAX) tablet 10 mg  Dose: 10 mg  Freq: EVERY 6 HOURS PRN Route: PO  PRN Reason: itching  Start: 01/30/18 1208   Admin Instructions: Caution to be used when administering multiple CNS depressing meds within a short time frame.        1832 (10 mg)-Given        0606 (10 mg)-Given             lactated ringers infusion  Rate: 75 mL/hr   Freq: CONTINUOUS Route: IV  Start: 01/30/18 1215   Admin Instructions: Change to saline lock when PO well tolerated.        (1219)-Not Given [C]        0359 ( )-New Bag             lidocaine (LMX4) cream  Freq: EVERY 1 HOUR PRN Route: Top  PRN Reason: pain  PRN Comment: with VAD insertion or accessing implanted port.  Start: 01/30/18 1208   Admin Instructions: Do NOT give if patient has a history of allergy to any local anesthetic or any \"rosa\" product.   Apply 30 minutes prior to VAD insertion or port access.  MAX Dose:  2.5 g (  of 5 g tube)               lidocaine 1 % 1 mL  Dose: 1 mL  Freq: EVERY 1 " "HOUR PRN Route: OTHER  PRN Comment: mild pain with VAD insertion or accessing implanted port  Start: 01/30/18 1208   Admin Instructions: Do NOT give if patient has a history of allergy to any local anesthetic or any \"rosa\" product. MAX dose 1 mL subcutaneous OR intradermal in divided doses.               naloxone (NARCAN) injection 0.1-0.4 mg  Dose: 0.1-0.4 mg  Freq: EVERY 2 MIN PRN Route: IV  PRN Reason: opioid reversal  Start: 01/30/18 1208   Admin Instructions: For respiratory rate LESS than or EQUAL to 8.  Partial reversal dose:  0.1 mg titrated q 2 minutes for Analgesia Side Effects Monitoring Sedation Level of 3 (frequently drowsy, arousable, drifts to sleep during conversation).Full reversal dose:  0.4 mg bolus for Analgesia Side Effects Monitoring Sedation Level of 4 (somnolent, minimal or no response to stimulation).  For ordered doses up to 2mg give IVP. Give each 0.4mg over 15 seconds in emergency situations. For non-emergent situations further dilute in 9mL of NS to facilitate titration of response.               ondansetron (ZOFRAN-ODT) ODT tab 4 mg  Dose: 4 mg  Freq: EVERY 6 HOURS PRN Route: PO  PRN Reasons: nausea,vomiting  Start: 01/30/18 1208   Admin Instructions: This is Step 1 of nausea and vomiting management.  If nausea not resolved in 15 minutes, go to Step 2 prochlorperazine (COMPAZINE). Do not push through foil backing. Peel back foil and gently remove. Place on tongue immediately. Administration with liquid unnecessary  With dry hands, peel back foil backing and gently remove tablet; do not push oral disintegrating tablet through foil backing; administer immediately on tongue and oral disintegrating tablet dissolves in seconds; then swallow with saliva; liquid not required.              Or  ondansetron (ZOFRAN) injection 4 mg  Dose: 4 mg  Freq: EVERY 6 HOURS PRN Route: IV  PRN Reasons: nausea,vomiting  Start: 01/30/18 1208   Admin Instructions: This is Step 1 of nausea and vomiting " management.  If nausea not resolved in 15 minutes, go to Step 2 prochlorperazine (COMPAZINE).  Irritant. For ordered doses up to 4 mg, give IV Push undiluted over 2-5 minutes.               oxyCODONE IR (ROXICODONE) tablet 5-10 mg  Dose: 5-10 mg  Freq: EVERY 4 HOURS PRN Route: PO  PRN Reason: moderate to severe pain  Start: 01/30/18 1208   Admin Instructions: Hold while on PCA or with regular IV opioid dosing.  IF CrCl UNKNOWN start at lowest end of dosing range.        2324 (5 mg)-Given        0354 (5 mg)-Given       0834 (10 mg)-Given       1442 (10 mg)-Given       1911 (10 mg)-Given       2303 (5 mg)-Given        1237 (5 mg)-Given       1753 (5 mg)-Given       2205 (5 mg)-Given            senna-docusate (SENOKOT-S;PERICOLACE) 8.6-50 MG per tablet 1-2 tablet  Dose: 1-2 tablet  Freq: 2 TIMES DAILY Route: PO  Start: 01/30/18 1215   Admin Instructions: Start with 1 tablet PO BID, If no bowel movement in 24 hours, increase to 2 tablets PO BID.  Hold for loose stools.        (1220)-Not Given       2024 (1 tablet)-Given        0831 (2 tablet)-Given       2203 (2 tablet)-Given        0829 (1 tablet)-Given       2021 (2 tablet)-Given        0811 (2 tablet)-Given       [ ] 2100           sodium chloride (PF) 0.9% PF flush 3 mL  Dose: 3 mL  Freq: EVERY 8 HOURS Route: IK  Start: 01/30/18 1215   Admin Instructions: And Q1H PRN, to lock peripheral IV dormant line.        (1220)-Not Given       1832 (3 mL)-Given       (2024)-Not Given        (0414)-Not Given       1250 (3 mL)-Given       2203 (3 mL)-Given        0216 (3 mL)-Given              1237 (3 mL)-Given       (2023)-Not Given        (0525)-Not Given [C]       [ ] 1215       [ ] 2015           sodium chloride (PF) 0.9% PF flush 3 mL  Dose: 3 mL  Freq: EVERY 1 HOUR PRN Route: IK  PRN Reason: line flush  PRN Comment: for peripheral IV flush post IV meds  Start: 01/30/18 1208              valsartan-hydrochlorothiazide (DIOVAN-HCT) 80-12.5 MG per tablet 1 tablet  Dose: 1  tablet  Freq: DAILY Route: PO  Start: 01/30/18 1015       (2121)-Not Given [C]        0831 (1 tablet)-Given        0828 (1 tablet)-Given        0811 (1 tablet)-Given           zolpidem (AMBIEN) tablet 5 mg  Dose: 5 mg  Freq: AT BEDTIME PRN Route: PO  PRN Reason: sleep  Start: 01/30/18 1010         2025 (5 mg)-Given           Completed Medications  Medications 01/27/18 01/28/18 01/29/18 01/30/18 01/31/18 02/01/18 02/02/18         Dose: 1 g  Freq: EVERY 8 HOURS Route: IV  Indications of Use: PERIOPERATIVE PHARMACOPROPHYLAXIS  Start: 01/30/18 1800   End: 01/31/18 0127   Admin Instructions: First post-op dose due 8 hours after intra-op dose, see eMAR.  Give IVP over 3-5 minutes.        1729 (1 g)-Given        0123 (1 g)-Given            Discontinued Medications  Medications 01/27/18 01/28/18 01/29/18 01/30/18 01/31/18 02/01/18 02/02/18         Dose: 10 mg  Freq: DAILY Route: PO  Start: 01/30/18 1015   End: 01/30/18 1226       (1218)-Not Given       1226-Med Discontinued            Freq: PRN  Start: 01/30/18 0811   End: 01/30/18 1135       0811 (1,000 mL)-Given       0921 (1,000 mL)-Given       0929 (1,000 mL)-Given       1135-Med Discontinued            Dose: 0.1-0.4 mg  Freq: EVERY 2 MIN PRN Route: IV  PRN Reason: opioid reversal  Start: 01/30/18 1208   End: 01/30/18 1225   Admin Instructions: For apnea or imminent respiratory arrest: give 0.4 mg IV undiluted Q 2 minutes PRN until desired degree of reversal is obtained, stop opioid and notify provider. Continue monitoring until discharge criteria are met for a minimum of 2 hours.  For severe sedation, decrease in respiratory depth, quality or respiratory rate less than 8: give 0.1 mg IV Q 2 minutes x 3 doses, stop opioid and notify provider.  Try to minimize reversal of analgesia especially in end-of-life patients  For ordered doses up to 2mg give IVP. Give each 0.4mg over 15 seconds in emergency situations. For non-emergent situations further dilute in 9mL of NS to  facilitate titration of response.        1225-Med Discontinued            Dose: 517 ml given  Freq: PRN  Start: 01/30/18 0921   End: 01/30/18 1135       0921 (517 ml given)-Given       1135-Med Discontinued            Freq: PRN  Start: 01/30/18 0922   End: 01/30/18 1135       0922 (60.5 mL)-Given       1135-Med Discontinued            Freq: PRN  Start: 01/30/18 0956   End: 01/30/18 1135       0956 (1 applicator)-Given       1135-Med Discontinued            Freq: PRN  Start: 01/30/18 0811   End: 01/30/18 1135       0811 (1,000 mL)-Given       1135-Med Discontinued       Medications 01/27/18 01/28/18 01/29/18 01/30/18 01/31/18 02/01/18 02/02/18

## 2018-01-30 NOTE — OR NURSING
Patient left ankle and shin pink and warm to touch-Dr. Mcclellan at bedside to assess-OK to proceed with surgery.

## 2018-01-30 NOTE — IP AVS SNAPSHOT
"` `     [place] [address] [facility]: [phone]                                              INTERAGENCY TRANSFER FORM - NURSING   2018                    Hospital Admission Date: 2018  MARVA JONES   : 1934  Sex: Female        Attending Provider: Diana Mcclellan MD     Allergies:  No Known Allergies    Infection:  None   Service:  SURGERY    Ht:  1.651 m (5' 5\")   Wt:  62.6 kg (138 lb)   Admission Wt:  62.6 kg (138 lb)    BMI:  22.96 kg/m 2   BSA:  1.69 m 2            Patient PCP Information     Provider PCP Type    Vel Calvert MD General      Current Code Status     Date Active Code Status Order ID Comments User Context       2018 12:08 PM Full Code [id number]  Kenyon Jimenez PA-C Inpatient       Code Status History     Date Active Date Inactive Code Status Order ID Comments User Context    10/29/2012  8:55 AM 2018 12:08 PM Full Code [id number]  Jethro Clarke PA-C Outpatient    10/26/2012 12:54 PM 10/29/2012  8:55 AM Full Code [id number]  Madyson Abdi RN Inpatient      Advance Directives        Scanned docmt in ACP Activity?           No scanned doc        Hospital Problems as of 2018              Priority Class Noted POA    Total knee replacement status Medium  2018 Yes      Non-Hospital Problems as of 2018              Priority Class Noted    HTN (hypertension) Medium  2012    DJD (degenerative joint disease) of knee Medium  2012    Lipid disorder Medium  2012    S/P total hip arthroplasty Medium  10/26/2012    Hypotension Medium  10/27/2012      Immunizations     Name Date      Influenza (IIV3) PF 10/16/14     Tdap (Adacel,Boostrix) 12     Zoster vaccine, live 07          END      ASSESSMENT     Discharge Profile Flowsheet     EXPECTED DISCHARGE     PAS Number  [id number]    Expected Discharge Date  18 (TCU) 18 0657   Senior Linkage Line Referral Placed  18 " "02/02/18 1117    DISCHARGE NEEDS ASSESSMENT     SKIN      Concerns To Be Addressed  discharge planning concerns 10/28/12 1508   Inspection of bony prominences  Full 02/02/18 0101    Equipment Currently Used at Home  walker, rolling;cane, straight;raised toilet 01/30/18 1550   Procedural focused assessment (identify areas inspected)   Foot, left;Heel, left;Knee, left;Hip, left 02/01/18 0546    Transportation Available  car 01/30/18 1516   Skin WDL  ex 02/02/18 0902    Equipment Used at Home  -- (pt owns a walker, did not use previously) 10/27/12 0806   Skin Integrity  incision(s) 02/02/18 0902    GASTROINTESTINAL (ADULT,PEDIATRIC,OB)     Inspection under devices  Full 02/02/18 0101    GI WDL  WDL 02/02/18 0902   Skin Temperature  warm 02/02/18 0902    All Quadrants Bowel Sounds  audible and normoactive 02/02/18 0902   Skin Moisture  dry 02/02/18 0902    Last Bowel Movement  02/02/18 02/02/18 0902   Skin Elasticity  quick return to original state 02/02/18 0101    Passing flatus  yes 02/02/18 0902   SAFETY      COMMUNICATION ASSESSMENT     Safety WDL  WDL 02/02/18 0902    Patient's communication style  spoken language (English or Bilingual) 01/30/18 0613   All Alarms  alarm(s) activated and audible 02/02/18 0902    FINAL RESOURCES                        Assessment WDL (Within Defined Limits) Definitions           Safety WDL     Effective: 09/28/15    Row Information: <b>WDL Definition:</b> Bed in low position, wheels locked; call light in reach; upper side rails up x 2; ID band on<br> <font color=\"gray\"><i>Item=AS safety wdl>>List=AS safety wdl>>Version=F14</i></font>      Skin WDL     Effective: 09/28/15    Row Information: <b>WDL Definition:</b> Warm; dry; intact; elastic; without discoloration; pressure points without redness<br> <font color=\"gray\"><i>Item=AS skin wdl>>List=AS skin wdl>>Version=F14</i></font>      Vitals     Vital Signs Flowsheet     COMMENTS     Pain Descriptors  Aching 02/01/18 9906    Comments  " "CMS unchanged 01/30/18 1620   Pain Intervention(s)  Medication (See eMAR) 02/01/18 2209    VITAL SIGNS     Response to Interventions  Absence of nonverbal indicators of pain 02/01/18 2244    Temp  97.9  F (36.6  C) 02/02/18 0734   ANALGESIA SIDE EFFECTS MONITORING      Temp src  Oral 02/02/18 0734   Side Effects Monitoring: Respiratory Quality  R 02/02/18 0857    Resp  18 02/02/18 0734   Side Effects Monitoring: Respiratory Depth  N 02/02/18 0857    Pulse  79 02/02/18 0734   Side Effects Monitoring: Sedation Level  1 02/02/18 0857    Heart Rate  87 02/01/18 1908   HEIGHT AND WEIGHT      Pulse/Heart Rate Source  Monitor 02/02/18 0055   Height  1.651 m (5' 5\") 01/30/18 0605    BP  109/60 02/02/18 0734   Weight  62.6 kg (138 lb) 01/30/18 0605    BP Location  Right arm 02/02/18 0734   BSA (Calculated - sq m)  1.69 01/30/18 0605    OXYGEN THERAPY     BMI (Calculated)  23.01 01/30/18 0605    SpO2  94 % 02/02/18 0734   POSITIONING      O2 Device  None (Room air) 02/02/18 0734   Body Position  independently positioning 02/02/18 0902    Oxygen Delivery  2 LPM 01/31/18 0741   Head of Bed (HOB)  HOB at 20-30 degrees 02/02/18 0055    RESPIRATORY MONITORING     Chair  Upright in chair 01/31/18 0951    Respiratory Monitoring (EtCO2)  36 mmHg 01/31/18 1507   Positioning/Transfer Devices  pillows 02/01/18 1735    Integrated Pulmonary Index (IPI)  10 01/31/18 1507   DAILY CARE      PAIN/COMFORT     Activity Management  ambulated to bathroom 02/02/18 0836    Patient Currently in Pain  denies 02/02/18 0857   Activity Assistance Provided  assistance, 1 person 02/02/18 0836    Preferred Pain Scale  number (Numeric Rating Pain Scale) 02/02/18 0857   Assistive Device Utilized  gait belt;walker 02/02/18 0836    0-10 Pain Scale  0 02/02/18 0857   Additional Documentation  Activity Device Assistance (Row) 01/31/18 1708    Pain Location  Knee 02/01/18 2209   ECG      Pain Orientation  Left 02/01/18 2209   ECG Rhythm  Sinus rhythm (with BBB) " 01/30/18 1337            Patient Lines/Drains/Airways Status    Active LINES/DRAINS/AIRWAYS     Name: Placement date: Placement time: Site: Days: Last dressing change:    Incision/Surgical Site 10/26/12 Right Hip 10/26/12   0934    1925     Incision/Surgical Site 01/30/18 Left Knee 01/30/18   0830    3             Patient Lines/Drains/Airways Status    Active PICC/CVC     None            Intake/Output Detail Report     Date Intake       Output     Net    Shift P.O. I.V. IV Piggyback Colloid Total Urine Drains Blood Total       Noc 01/31/18 2300 - 02/01/18 0659 200 -- -- -- 200 -- 0 -- -- 200    Day 02/01/18 0700 - 02/01/18 1459 -- -- -- -- -- -- -- -- -- 0    Nithya 02/01/18 1500 - 02/01/18 2259 500 -- -- -- 500 -- -- -- -- 500    Noc 02/01/18 2300 - 02/02/18 0659 -- -- -- -- -- -- -- -- -- 0    Day 02/02/18 0700 - 02/02/18 1459 240 -- -- -- 240 -- -- -- -- 240      Last Void/BM       Most Recent Value    Urine Occurrence 1 at 02/02/2018 1109    Stool Occurrence       Case Management/Discharge Planning     Case Management/Discharge Planning Flowsheet     REFERRAL INFORMATION     Concerns To Be Addressed  discharge planning concerns 10/28/12 1508    Did the Initial Social Work Assessment result in a Social Work Case?  Yes 02/01/18 1138   DISCHARGE PLANNING      Admission Type  inpatient 02/01/18 1138   Transportation Available  car 01/30/18 1516    Arrived From  home or self-care 02/01/18 1138   Skilled Nursing Facility  [facility] 10/30/12 1025    Referral Source  physician 02/01/18 1138   Skilled Nursing Facility Phone Number  [phone] 10/30/12 1025    Reason For Consult  discharge planning 02/01/18 1138   Equipment Used at Home  -- (pt owns a walker, did not use previously) 10/27/12 0806    Record Reviewed  medical record 02/01/18 1138   FINAL RESOURCES      CTS Assigned to Case  Lynnette Olmos 02/01/18 1138   Equipment Currently Used at Home  walker, rolling;cane, straight;raised toilet  01/30/18 1550    LIVING ENVIRONMENT     PAS Number  [id number] 02/02/18 1117    Lives With  alone 02/01/18 1138   Senior Linkage Line Referral Placed  02/02/18 02/02/18 1117    Living Arrangements  house 02/01/18 1138   ABUSE RISK SCREEN      Able to Return to Prior Living Arrangements  no 02/01/18 1138   QUESTION TO PATIENT:  Has a member of your family or a partner(now or in the past) intimidated, hurt, manipulated, or controlled you in any way?  no 01/30/18 0602    COPING/STRESS     QUESTION TO PATIENT: Do you feel safe going back to the place where you are living?  yes 01/30/18 0602    Major Change/Loss/Stressor  surgery/procedure;death (Loss of  3/2017) 01/30/18 0613   OBSERVATION: Is there reason to believe there has been maltreatment of a vulnerable adult (ie. Physical/Sexual/Emotional abuse, self neglect, lack of adequate food, shelter, medical care, or financial exploitation)?  no 01/30/18 0602    EXPECTED DISCHARGE     OTHER      Expected Discharge Date  02/02/18 (TCU) 02/01/18 0657   Are you depressed or being treated for depression?  No 01/30/18 0613    ASSESSMENT/CONCERNS TO BE ADDRESSED

## 2018-01-30 NOTE — IP AVS SNAPSHOT
` ` Patient Information     Patient Name Sex     Gely Tam ([id number]) Female 1934       Room Bed    0632 [id number]      Patient Demographics     Address Phone    [address] [place] [address] [phone] (Home) *Preferred*      Patient Ethnicity & Race     Ethnic Group Patient Race    American White      Emergency Contact(s)     Name Relation Home Work Mobile    ashley Tam [phone]      adela tam Other [phone]        Documents on File        Status Date Received Description       Documents for the Patient    Consent for Services - [facility] Received () 11     Privacy Notice - [facility] Received 11     Insurance Card Received () 11 MEDICA    Insurance Card Received 11 MEDICARE    Insurance Card Received 12 aarp    Privacy Notice - [place] Received 10/26/12     External Medication Information Consent Accepted 11/04/15     Patient ID Received 10/26/12     Consent for Services - Hospital/Clinic Received () 11/04/15     Consent for EHR Access  13 Copied from existing Consent for services - IP/ED collected on 10/26/2012    [facility] Specified Other       Insurance Card Received 11/04/15 medicare    Insurance Card Received 11/04/15 aarp    Consent for Services/Privacy Notice - Hospital/Clinic       Care Everywhere Prospective Auth Received 18     Patient ID Received 18     Insurance Card Received 18     Consent for Services/Privacy Notice - Hospital/Clinic Received but Refused Research 18     Consent for Services - Geriatrics Received 18        Documents for the Encounter    CMS IM for Patient Signature Received 18     H/P - History and Physical  18 [facility], HISTORY AND PHY 2018    Lab Result - HIM Scan  18 LABS, [facility]    EKG Cardiac - HIM Scan  18 EKG, [facility]    CE Point of Care Auth Received        Admission Information     Attending Provider Admitting Provider Admission Type  Admission Date/Time    Diana Mcclellan MD Vorlicky, Loren N, MD Elective 01/30/18  0517    Discharge Date Hospital Service Auth/Cert Status Service Area     Surgery Incomplete [facility] SERVICES    Unit Room/Bed Admission Status        55 ORTHO SPEC UNIT 5503/5503-01 Admission (Confirmed)       Admission     Complaint    djd, Total knee replacement status      Hospital Account     Name Acct ID Class Status Primary Coverage    Gely Tam [id number] Inpatient Open MEDICARE - MEDICARE            Guarantor Account (for Hospital Account #[id number])     Name Relation to Pt Service Area Active? Acct Type    Gely Tam Self FCS Yes Personal/Family    Address Phone          [address] [place] [address] [phone](H)              Coverage Information (for Hospital Account #[id number])     1. MEDICARE/MEDICARE     F/O Payor/Plan Precert #    MEDICARE/MEDICARE     Subscriber Subscriber #    Gely Tam [id number]    Address Phone    [employer]  [address]  [place] [address] [phone]          2. COMMERCIAL/AARP     F/O Payor/Plan Precert #    COMMERCIAL/AARP     Subscriber Subscriber #    Gely Tam [id number]    Address Phone    [facility] CLAIM DIV  [id number]  [place] [address] [phone]

## 2018-01-30 NOTE — ANESTHESIA PREPROCEDURE EVALUATION
Procedure: Procedure(s):  ARTHROPLASTY KNEE  Preop diagnosis: djd    No Known Allergies  Past Medical History:   Diagnosis Date     Arthritis      HTN (hypertension)      Hyperlipemia      Past Surgical History:   Procedure Laterality Date     ARTHROPLASTY HIP  10/26/2012    Procedure: ARTHROPLASTY HIP;  RIGHT TOTAL HIP ARTHROPLASTY (J&J)^;  Surgeon: Diana Mcclellan MD;  Location: [facility]     GYN SURGERY  1981     HYSTERECTOMY  1981    left ovary remains     LAPAROSCOPIC SALPINGOTOMY      bilat,. benign      ORTHOPEDIC SURGERY  2010    right tka     SOFT TISSUE SURGERY  1984 vein ligation     Social History   Substance Use Topics     Smoking status: Former Smoker     Packs/day: 1.00     Years: 30.00     Quit date: 1/30/1995     Smokeless tobacco: Never Used     Alcohol use 0.0 oz/week     0 Standard drinks or equivalent per week      Comment: 1/day   wine     Prior to Admission medications    Medication Sig Start Date End Date Taking? Authorizing Provider   AMOXICILLIN PO Take 2,000 mg by mouth daily as needed (1 hour prior to dental procedures)   Yes Reported, Patient   ZOLPIDEM TARTRATE PO Take 5 mg by mouth nightly as needed for sleep   Yes Reported, Patient   aspirin 81 MG chewable tablet Take 1 tablet (81 mg) by mouth daily   Yes Kelly Mejía MD   valsartan-hydrochlorothiazide (DIOVAN HCT) 80-12.5 MG per tablet Take 1 tablet by mouth daily   Yes Kelly Mejía MD   Calcium Carbonate-Vit D-Min (CALCIUM 1200 PO) Take 2 tablets by mouth daily.     Yes Reported, Patient   atorvastatin (LIPITOR) 10 MG tablet Take 10 mg by mouth daily.   Yes Reported, Patient     Current Facility-Administered Medications Ordered in Epic   Medication Dose Route Frequency Last Rate Last Dose     ceFAZolin (ANCEF) intermittent infusion 2 g in 50 mL dextrose PREMIX  2 g Intravenous Pre-Op/Pre-procedure x 1 dose         ceFAZolin (ANCEF) 1g in 10 mL SWFI premix for IVP  1 g Intravenous See Admin Instructions         tranexamic  acid (CYKLOKAPRON) 1 g in NaCl 0.9 % 60 mL bolus  1 g Intravenous Once         tranexamic acid (CYKLOKAPRON) 1 g in NaCl 0.9 % 60 mL bolus  1 g Intravenous Once         lidocaine 1 % 1 mL  1 mL Other Q1H PRN   0.2 mL at 01/30/18 0705     lactated ringers infusion   Intravenous Continuous 25 mL/hr at 01/30/18 0704       fentaNYL (PF) (SUBLIMAZE) injection 50 mcg  50 mcg Intravenous Pre-Op/Pre-procedure x 1 dose         midazolam (VERSED) injection 1 mg  1 mg Intravenous Pre-Op/Pre-procedure x 1 dose         No current Epic-ordered outpatient prescriptions on file.       lactated ringers 25 mL/hr at 01/30/18 0704     Wt Readings from Last 1 Encounters:   01/30/18 62.6 kg (138 lb)     Temp Readings from Last 1 Encounters:   01/30/18 35.8  C (96.4  F) (Temporal)     BP Readings from Last 6 Encounters:   01/30/18 107/68   11/04/15 110/72   11/03/14 114/78   10/30/12 96/49   05/24/12 140/70     Pulse Readings from Last 4 Encounters:   11/04/15 64   11/03/14 68     Resp Readings from Last 1 Encounters:   01/30/18 16     SpO2 Readings from Last 1 Encounters:   01/30/18 99%     Recent Labs   Lab Test  10/28/12   0515  10/26/12   0545   NA  133  140   POTASSIUM  3.4  3.5   CHLORIDE  99  101   CO2  29  27   ANIONGAP  5*  11   GLC  108*  109*   BUN  6*  14   CR  0.58  0.56   ED  8.0*  9.7     Recent Labs   Lab Test  05/24/12   0952   AST  15   ALT  15   ALKPHOS  62   BILITOTAL  1.0     Recent Labs   Lab Test  01/02/18   1036  10/30/12   0658  10/29/12   0808   WBC   --   4.9  5.0   HGB  14.1  9.5*  9.4*   PLT   --   166  143*     Recent Labs   Lab Test  10/26/12   0545   ABO  A   RH   Pos     Recent Labs   Lab Test  10/30/12   0658  10/29/12   0610   10/26/12   0545   06/23/10   0723   INR  1.74*  2.10*   < >  0.91   < >  0.85*   PTT   --    --    --   26   --   25    < > = values in this interval not displayed.      No results for input(s): TROPI in the last 45664 hours.  No results for input(s): PH, PCO2, PO2, HCO3 in the  last 63242 hours.  No results for input(s): HCG in the last 92282 hours.  No results found for this or any previous visit (from the past 744 hour(s)).    RECENT LABS:   ECG:   ECHO:     Anesthesia Evaluation     . Pt has had prior anesthetic.     No history of anesthetic complications          ROS/MED HX    ENT/Pulmonary:     (+)tobacco use, Past use , . .    Neurologic:       Cardiovascular:     (+) Dyslipidemia, hypertension----. : . . . :. .       METS/Exercise Tolerance:     Hematologic:         Musculoskeletal:   (+) arthritis, , , -       GI/Hepatic:        (-) GERD   Renal/Genitourinary:         Endo:         Psychiatric:         Infectious Disease:         Malignancy:         Other:                     Physical Exam  Normal systems: cardiovascular and pulmonary    Airway   Mallampati: I  Neck ROM: full    Dental   (+) caps    Cardiovascular       Pulmonary                     Anesthesia Plan      History & Physical Review  History and physical reviewed and following examination; no interval change.    ASA Status:  3 .    NPO Status:  > 8 hours    Plan for General and LMA with Intravenous induction. Maintenance will be Balanced.    PONV prophylaxis:  Ondansetron (or other 5HT-3)       Postoperative Care  Postoperative pain management:  IV analgesics.      Consents  Anesthetic plan, risks, benefits and alternatives discussed with:  Patient and Daughter/Son..                          .

## 2018-01-30 NOTE — PROGRESS NOTES
Admission medication history interview status for the 1/30/2018  admission is complete. See EPIC admission navigator for prior to admission medications     Medication history source reliability:Good    Medication history interview source(s):Patient    Medication history resources (including written lists, pill bottles, clinic record):Patient mailed in her medication list prior to surgery    Primary pharmacy.[facility]    Additional medication history information not noted on PTA med list :None    Time spent in this activity: 40 minutes    Prior to Admission medications    Medication Sig Last Dose Taking? Auth Provider   AMOXICILLIN PO Take 2,000 mg by mouth daily as needed (1 hour prior to dental procedures) Past Month at PRN Yes Reported, Patient   ZOLPIDEM TARTRATE PO Take 5 mg by mouth nightly as needed for sleep Past Month at [facility] Yes Reported, Patient   aspirin 81 MG chewable tablet Take 1 tablet (81 mg) by mouth daily 1/28/2018 at AM Yes Kelly Mejía MD   valsartan-hydrochlorothiazide (DIOVAN HCT) 80-12.5 MG per tablet Take 1 tablet by mouth daily 1/28/2018 at AM Yes Kelly Mejía MD   Calcium Carbonate-Vit D-Min (CALCIUM 1200 PO) Take 2 tablets by mouth daily.   1/29/2018 at AM Yes Reported, Patient   atorvastatin (LIPITOR) 10 MG tablet Take 10 mg by mouth daily. 1/28/2018 at AM Yes Reported, Patient

## 2018-01-30 NOTE — ANESTHESIA CARE TRANSFER NOTE
Patient: Gely Tam    Procedure(s):  LEFT TOTAL KNEE ARTHROPLASTY  - Wound Class: I-Clean    Diagnosis: djd  Diagnosis Additional Information: No value filed.    Anesthesia Type:   General, LMA     Note:  Airway :Face Mask  Patient transferred to:PACU  Comments: Pt awake and able to verbalize needs. ALL monitors on and audible. Vital signs stable. Spontaneous respiration without difficulty. o2 sats 98% on 10Lo2 per face mask. Pt denies pain. Report given to PACU RN.Handoff Report: Identifed the Patient, Identified the Reponsible Provider, Reviewed the pertinent medical history, Discussed the surgical course, Reviewed Intra-OP anesthesia mangement and issues during anesthesia, Set expectations for post-procedure period and Allowed opportunity for questions and acknowledgement of understanding      Vitals: (Last set prior to Anesthesia Care Transfer)    CRNA VITALS  1/30/2018 0939 - 1/30/2018 1016      1/30/2018             Resp Rate (observed): 10                Electronically Signed By: SUSAN Valverde CRNA  January 30, 2018  10:16 AM

## 2018-01-30 NOTE — OP NOTE
Procedure Date: 01/30/2018      DATE OF DICTATION: 01/30/2018      DATE OF OPERATION: 01/30/2018      PREOPERATIVE DIAGNOSIS:  Osteoarthritis of the left knee.      POSTOPERATIVE DIAGNOSIS:  Osteoarthritis of the left knee.      PROCEDURE:  Left total knee arthroplasty using the DePuy ATTUNE knee system, posterior stabilized rotating platform, #5 femur, #4 tibia, 38 mm patellar button and 10 mm of tibial polyethylene.      INDICATIONS FOR PROCEDURE:  The patient is an 83-year-old female with a long history of bilateral osteoarthritis of the knee.  She previously had a right total knee arthroplasty and did well.  She has been getting injection treatments of her left knee.  Injection treatments have become decreasingly effective.  I discussed treatment options with her.  I explained to her the risks, benefits, potential complications of total knee arthroplasty.  This discussion included but was not specific to infection, vascular and neurologic complications, DVT, septic and aseptic loosening, arthrofibrosis, and the possible need for further revision surgery.  After this discussion, the patient wanted to proceed with surgery.  The patient did have some concerns about the swelling in her leg from venous stasis changes and a large Baker's cyst.  I told the patient that a total knee replacement should over time cause the Baker's cyst to go away and that there should be improved venous flow up the leg; however, she had some venous incompetency which still  exists after a total knee replacement.  She voiced understanding and wanted to proceed.      ANESTHESIA:  General.      SURGEON: Diana Mcclellan MD      ASSISTANT: Alvino Jimenez PA-C      PROCEDURE:  The patient was taken to the operating room and placed on the operating table in the supine position.  After adequate induction of a general anesthetic, a bump was placed beneath the left hip and pneumatic tourniquet was applied to the left thigh, the patient was given 2  grams of Ancef for infection prophylaxis given one hour prior to the incision.  We then performed a sterile prep and drape of the left knee and left lower extremity.  After sterile prep and drape, the limb was exsanguinated, tourniquet was elevated to 300 mmHg.  I then made a midline incision exposing the extensor mechanism and proceeded with a medial parapatellar arthrotomy in a quad splitting approach.  I then identified the entry point for the intramedullary guide of the femur, set it at 5 degrees of valgus and made my distal femoral cut.  We then sized the femur appropriately and applied the jig for 3 degrees of external rotation of the femoral component.  We then made our anterior and posterior cuts along with anterior, posterior chamfers.  At this point, we directed our attention to the tibia and used an extramedullary guide to establish a neutral cut of the tibia based off the deficient medial side in order to correct the varus deformity and release it off the medial side of the tibia.  Once the tibial cut was made, we checked our flexion and extension gaps and were found to be equal.  I then finished preparation of the femur by making a box cut and finished the preparation of the tibia.  At this point, we sized the patella and made our patellar cuts and finished preparation of the patella.  We then removed the posterior osteophytes and injected the posterior capsule with a mixture of Ropivacaine and Toradol.  When the cement was being mixed, we began  preparing the cement surfaces using pulsatile jet lavage with antibiotic saline.  Once the cement was of appropriate consistency, we cemented first the tibial component followed by the femoral component.  Once the components were fully seated, excess cement was removed using [place] elevator.  We then placed the knee in full extension with trial polyethylene in place and allowed the cement to harden.  At this point, we cemented the patella, again removing any  excess cement using the [place] elevator.  When the cement was hardening, we soaked the knee in solution of Betadine for 3 minutes and irrigated using pulsatile lavage, using approximately 3 liters of antibiotic saline in the pulsatile lavage.  Once the cement had hardened, we took the knee through a range of motion.  Patella tracked ideally.  A good soft tissue balance in flexion and extension.  We then removed the trial polyethylene and inspected the joint for loose bone and cement and removed it when it was found.  We then put in the actual rotating platform, tibial polyethylene, reduced the knee and again took it through a range of motion.  Patella tracked ideally and we obtained full extension without difficulty.  We then placed the knee in approximately 30 degrees of flexion and put in a medium Hemovac drain deep to the fascia and closed the arthrotomy using 0 Ethibond sutures.  This was oversewn with Stratafix.   Deep subcu was closed using 0 Vicryl, superficial subcu was closed with 2-0 Vicryl and skin was closed with a running 3-0 Monocryl followed by a peroneal dressing.  A sterile dressing was applied.  The patient tolerated the operative procedure.  There were no intraoperative complications.  The patient was sent to [facility] in stable condition.  The plan will be for the patient to get 24 hours of Ancef for infection prophylaxis, 30 days aspirin for DVT prophylaxis.         SHAI SHAH MD             D: 2018   T: 2018   MT: MD      Name:     MARVA JONES   MRN:      [id number]        Account:        [id number]   :      1934           Procedure Date: 2018      Document: [id number]

## 2018-01-30 NOTE — PLAN OF CARE
Problem: Patient Care Overview  Goal: Plan of Care/Patient Progress Review  PT: Orders received, evaluation completed and treatment initiated. 83 year old female s/p L TKA. Pt lives alone in a house with 3 stairs to enter, and one down to a sunken living room. Pt lives alone at home but reports multiple children in the area. Pt has a walker, cane and RTS at time. IND with mobility/ADLs at baseline.    Discharge Planner PT   Patient plan for discharge: Hopeful for TCU if she qualifies  Current status: Supine to sit with SBA. Sit to/from stand with CGA. Ambulates 10' with FWW and CGA. No L LE buckling noted. Tolerates L TKA exercises well. IND SLR.   Barriers to return to prior living situation: Stairs to enter - will address in therapy.   Recommendations for discharge: Will update when further OOB mobility has been assessed.   Rationale for recommendations: Patient would benefit from continued acute care PT to maximize IND/safety with mobility and to assist with safe discharge planning.       Entered by: Ashley Rivas 01/30/2018 3:54 PM

## 2018-01-30 NOTE — ANESTHESIA POSTPROCEDURE EVALUATION
Patient: Gely Tam    Procedure(s):  LEFT TOTAL KNEE ARTHROPLASTY  - Wound Class: I-Clean    Diagnosis:djd  Diagnosis Additional Information: No value filed.    Anesthesia Type:  General, LMA    Note:  Anesthesia Post Evaluation    Patient location during evaluation: PACU  Patient participation: Able to fully participate in evaluation  Level of consciousness: sleepy but conscious and responsive to verbal stimuli  Pain management: adequate  Airway patency: patent  Cardiovascular status: acceptable and hemodynamically stable  Respiratory status: acceptable and unassisted  Hydration status: acceptable  PONV: none     Anesthetic complications: None          Last vitals:  Vitals:    01/30/18 1245 01/30/18 1345 01/30/18 1549   BP: 95/56 99/55 100/58   Resp: 13 14 14   Temp:   36.2  C (97.2  F)   SpO2: 98% 97% 95%         Electronically Signed By: Baljit Martínez MD  January 30, 2018  4:07 PM

## 2018-01-30 NOTE — PROGRESS NOTES
01/30/18 1500   Quick Adds   Type of Visit Initial PT Evaluation   Living Environment   Lives With alone   Living Arrangements house   Home Accessibility (Walkin shower)   Number of Stairs to Enter Home 3  (one rail)   Number of Stairs Within Home 1  (to living room)   Transportation Available car   Self-Care   Usual Activity Tolerance good   Current Activity Tolerance fair   Regular Exercise no   Equipment Currently Used at Home walker, rolling;cane, straight;raised toilet   Functional Level Prior   Ambulation 0-->independent   Transferring 0-->independent   Toileting 0-->independent   Bathing 0-->independent   Dressing 0-->independent   Fall history within last six months no   General Information   Onset of Illness/Injury or Date of Surgery - Date 01/30/18   Referring Physician Tony FIGUEROA   Patient/Family Goals Statement To go to TCU   Pertinent History of Current Problem (include personal factors and/or comorbidities that impact the POC) 83 year old female s/p L TKA.    Precautions/Limitations fall precautions   Weight-Bearing Status - LLE weight-bearing as tolerated   Cognitive Status Examination   Orientation orientation to person, place and time   Level of Consciousness alert   Follows Commands and Answers Questions 100% of the time;able to follow single-step instructions   Pain Assessment   Patient Currently in Pain No   Range of Motion (ROM)   ROM Comment Decreased L LE AROM secondary to pain, weakness   Strength   Strength Comments Able to perform x10 SLR on the L LE   Bed Mobility   Bed Mobility Comments Supine to sit with SBA   Transfer Skills   Transfer Comments Sit to/from stand with CGA   Gait   Gait Comments Ambulates 5' with FWW and CGA   Balance   Balance Comments Requires bilatearl UE support on FWW at this time   Modality Interventions   Planned Modality Interventions Cryotherapy   Planned Modality Interventions Comments PRN   General Therapy Interventions   Planned Therapy Interventions bed  "mobility training;gait training;ROM;strengthening;transfer training;home program guidelines;progressive activity/exercise   Clinical Impression   Criteria for Skilled Therapeutic Intervention yes, treatment indicated   PT Diagnosis Impaired gait   Influenced by the following impairments Pain, decreased L LE AROM/strength, impaired balance   Functional limitations due to impairments Decreased IND with functional mobility   Clinical Presentation Stable/Uncomplicated   Clinical Presentation Rationale Medically stable   Clinical Decision Making (Complexity) Low complexity   Therapy Frequency` 2 times/day   Predicted Duration of Therapy Intervention (days/wks) 3 days   Anticipated Discharge Disposition (TBD pending further assessment of mobility)   Risk & Benefits of therapy have been explained Yes   Patient, Family & other staff in agreement with plan of care Yes   [facility] [employer] TM \"6 Clicks\"   2016, Trustees of [facility], under license to [employer].  All rights reserved.   6 Clicks Short Forms Basic Mobility Inpatient Short Form   [facility] AM-PAC  \"6 Clicks\" V.2 Basic Mobility Inpatient Short Form   1. Turning from your back to your side while in a flat bed without using bedrails? 4 - None   2. Moving from lying on your back to sitting on the side of a flat bed without using bedrails? 3 - A Little   3. Moving to and from a bed to a chair (including a wheelchair)? 3 - A Little   4. Standing up from a chair using your arms (e.g., wheelchair, or bedside chair)? 3 - A Little   5. To walk in hospital room? 3 - A Little   6. Climbing 3-5 steps with a railing? 3 - A Little   Basic Mobility Raw Score (Score out of 24.Lower scores equate to lower levels of function) 19   Total Evaluation Time   Total Evaluation Time (Minutes) 10     "

## 2018-01-30 NOTE — IP AVS SNAPSHOT
"          [place] [address] [facility]: [phone]                                              INTERAGENCY TRANSFER FORM - LAB / IMAGING / EKG / EMG RESULTS   2018                    Hospital Admission Date: 2018  MARVA JONES   : 1934  Sex: Female        Attending Provider: Diana Mcclellan MD     Allergies:  No Known Allergies    Infection:  None   Service:  SURGERY    Ht:  1.651 m (5' 5\")   Wt:  62.6 kg (138 lb)   Admission Wt:  62.6 kg (138 lb)    BMI:  22.96 kg/m 2   BSA:  1.69 m 2            Patient PCP Information     Provider PCP Type    Vel Calvert MD General         Lab Results - 3 Days      Glucose by meter [[id number]] (Abnormal)  Resulted: 18 1301, Result status: Final result    Ordering provider: Diana Mcclellan MD  18 0556 Resulting lab: POINT OF CARE TEST, GLUCOSE    Specimen Information    Type Source Collected On     18 0556          Components       Value Reference Range Flag Lab   Glucose 111 70 - 99 mg/dL H 170            Hemoglobin [[id number]] (Abnormal)  Resulted: 18 0712, Result status: Final result    Ordering provider: Kenyon Jimenez PA-C  18 0000 Resulting lab: [facility]    Specimen Information    Type Source Collected On   Blood  18 0640          Components       Value Reference Range Flag Lab   Hemoglobin 11.0 11.7 - 15.7 g/dL L FrStHsLb            Glucose [[id number]] (Abnormal)  Resulted: 18 0716, Result status: Final result    Ordering provider: Diana Mcclellan MD  18 0000 Resulting lab: [facility]    Specimen Information    Type Source Collected On   Blood  18 0536          Components       Value Reference Range Flag Lab   Glucose 123 70 - 99 mg/dL H FrStHsLb            Hemoglobin [[id number]] (Abnormal)  Resulted: 18 0702, Result status: Final result    Ordering provider: Kenyon Jimenez PA-C  18 0000 Resulting " lab: [facility]    Specimen Information    Type Source Collected On   Blood  01/31/18 0536          Components       Value Reference Range Flag Lab   Hemoglobin 10.8 11.7 - 15.7 g/dL L FrStHsLb            Creatinine [[id number]]  Resulted: 01/30/18 1311, Result status: Final result    Ordering provider: Baljit Martínez MD  01/30/18 0645 Resulting lab: [facility]    Specimen Information    Type Source Collected On     01/30/18 0645          Components       Value Reference Range Flag Lab   Creatinine 0.65 0.52 - 1.04 mg/dL  FrStHsLb   GFR Estimate 86 >60 mL/min/1.7m2  FrStHsLb   Comment:  Non  GFR Calc   GFR Estimate If Black >90 >60 mL/min/1.7m2  FrStHsLb   Comment:   GFR Calc            Potassium [[id number]]  Resulted: 01/30/18 0708, Result status: Final result    Ordering provider: Baljit Martínez MD  01/30/18 0603 Resulting lab: [facility]    Specimen Information    Type Source Collected On   Blood  01/30/18 0645          Components       Value Reference Range Flag Lab   Potassium 3.4 3.4 - 5.3 mmol/L  FrStHsLb            Testing Performed By     Lab - Abbreviation Name Director Address Valid Date Range    14 - FrStHsLb [facility] Unknown [address] [place] [address] 05/08/15 1057 - Present    170 - Unknown POINT OF CARE TEST, GLUCOSE Unknown Unknown 10/31/11 1114 - Present            Unresulted Labs (24h ago through future)    Start       Ordered    Unscheduled  Hemoglobin  CONDITIONAL X 2,   Routine     Comments:  IF morning Hemoglobin result is LESS than 8.0 on POD 1 and/or POD 2, release order and recheck Hemoglobin in the evening at 1700.  Notify Provider if second Hemoglobin result is LESS than 7.5.    01/30/18 1208          Encounter-Level Documents:     There are no encounter-level documents.      Order-Level Documents - 01/30/2018:     Scan on 1/29/2018  7:12 AM by Outside, Provider : EKG,  KORY (below)

## 2018-01-30 NOTE — IP AVS SNAPSHOT
MRN:[id number]                      After Visit Summary   1/30/2018    Gely Tam    MRN: [id number]           Thank you!     Thank you for choosing [facility] for your care. Our goal is always to provide you with excellent care. Hearing back from our patients is one way we can continue to improve our services. Please take a few minutes to complete the written survey that you may receive in the mail after you visit with us. Thank you!        Patient Information     Date Of Birth          4/30/1934        Designated Caregiver       Most Recent Value    Caregiver    Will someone help with your care after discharge? no [hopes to go to [facility]]    Name of designated caregiver Hoping for [facility]    Caregiver address Extended care facility      About your hospital stay     You were admitted on:  January 30, 2018 You last received care in the:  [facility] [facility] [address] [facility]    You were discharged on:  February 2, 2018        Reason for your hospital stay       Left total knee replacement                  Who to Call     For medical emergencies, please call 911.  For non-urgent questions about your medical care, please call your primary care provider or clinic, [phone]  For questions related to your surgery, please call your surgery clinic        Attending Provider     Provider Specialty    Diana Mcclellan MD Orthopedics       Primary Care Provider Office Phone # Fax #    [facility]vert [phone]      After Care Instructions     Activity - Up ad jonny           Advance Diet as Tolerated       Follow this diet upon discharge: Orders Placed This Encounter      Room Service      Regular Diet Adult            Encourage PO fluids           General info for SNF       Length of Stay Estimate: Short Term Care: Estimated # of Days <30  Condition at Discharge: Improving  Level of care:skilled   Rehabilitation Potential: Good  Admission H&P remains valid and  "up-to-date: Yes  Recent Chemotherapy: N/A  Use Nursing Home Standing Orders: Yes            Mantoux instructions       Give two-step Mantoux (PPD) Per Facility Policy Yes            Weight bearing status           Wound care (specify)       Site:   Left knee  Instructions:  Leave dressing intact, reinforce if needed                  Follow-up Appointments     Follow Up and recommended labs and tests       Follow up with Dr. Mcclellan and Alvino FIGUEROA  in 2 weeks.  No follow up labs or test are needed.  Call for appointment [phone]                  Additional Services     Occupational Therapy Adult Consult       Evaluate and treat as clinically indicated.    Reason:  Post op total hip            Physical Therapy Adult Consult       Evaluate and treat as clinically indicated.    Reason:  Post op total hip                  Pending Results     No orders found from 1/28/2018 to 1/31/2018.            Statement of Approval     Ordered          02/02/18 1050  I have reviewed and agree with all the recommendations and orders detailed in this document.  EFFECTIVE NOW     Approved and electronically signed by:  Abby Kim PA-C             Admission Information     Date & Time Provider Department Dept. Phone    1/30/2018 Diana Mcclellan MD [place] [address] [facility] [phone]      Your Vitals Were     Blood Pressure Pulse Temperature Respirations Height Weight    109/60 (BP Location: Right arm) 79 97.9  F (36.6  C) (Oral) 18 1.651 m (5' 5\") 62.6 kg (138 lb)    Pulse Oximetry BMI (Body Mass Index)                94% 22.96 kg/m2          MyChart Information     [email or web address] lets you send messages to your doctor, view your test results, renew your prescriptions, schedule appointments and more. To sign up, go to [email or web address] . Click on \"Log in\" on the left side of the screen, which will take you to the Welcome page. Then click on \"Sign up Now\" on the right side of the page.     You will be " asked to enter the access code listed below, as well as some personal information. Please follow the directions to create your username and password.     Your access code is: [email or web address]  Expires: 5/3/2018 11:12 AM     Your access code will  in 90 days. If you need help or a new code, please call your [facility] clinic or [phone].        Care EveryWhere ID     This is your Care EveryWhere ID. This could be used by other organizations to access your [facility] medical records  [id number]        Equal Access to Services     [facility]: Hadii urban osorio hadthoro Sophillipali, waaxda luqadaha, qaybta kaalmada desire, roel valencia . So [facility] [phone].    ATENCIÓN: Si habla español, tiene a figueroa disposición servicios gratuitos de asistencia lingüística. Llgrace al [phone].    We comply with applicable federal civil rights laws and [place] laws. We do not discriminate on the basis of race, color, national origin, age, disability, sex, sexual orientation, or gender identity.               Review of your medicines      START taking        Dose / Directions    acetaminophen 325 MG tablet   Commonly known as:  TYLENOL        Dose:  650 mg   Take 2 tablets (650 mg) by mouth every 4 hours as needed for other (surgical pain)   Quantity:  60 tablet   Refills:  0       aspirin 325 MG EC tablet   Replaces:  aspirin 81 MG chewable tablet        Dose:  325 mg   Take 1 tablet (325 mg) by mouth 2 times daily   Quantity:  60 tablet   Refills:  0       oxyCODONE IR 5 MG tablet   Commonly known as:  ROXICODONE        Dose:  5-10 mg   Take 1-2 tablets (5-10 mg) by mouth every 4 hours as needed for moderate to severe pain (Take one tab for moderate pain, two tabs for severe pain)   Quantity:  45 tablet   Refills:  0       senna-docusate 8.6-50 MG per tablet   Commonly known as:  SENOKOT-S;PERICOLACE        Dose:  1-2 tablet   Take 1-2 tablets by mouth 2 times daily   Quantity:  45 tablet   Refills:  0          CONTINUE these medicines which have NOT CHANGED        Dose / Directions    AMOXICILLIN PO   Notes to Patient:  Take as needed before dental proceedures        Dose:  2000 mg   Take 2,000 mg by mouth daily as needed (1 hour prior to dental procedures)   Refills:  0       CALCIUM 1200 PO   Notes to Patient:  Take as previously perscribed        Dose:  2 tablet   Take 2 tablets by mouth daily.   Refills:  0       DIOVAN HCT 80-12.5 MG per tablet   Generic drug:  valsartan-hydrochlorothiazide        Dose:  1 tablet   Take 1 tablet by mouth daily   Quantity:  30 tablet   Refills:  0       LIPITOR 10 MG tablet   Generic drug:  atorvastatin        Dose:  10 mg   Take 10 mg by mouth daily.   Refills:  0       ZOLPIDEM TARTRATE PO        Dose:  5 mg   Take 5 mg by mouth nightly as needed for sleep   Refills:  0         STOP taking     aspirin 81 MG chewable tablet   Replaced by:  aspirin 325 MG EC tablet                Where to get your medicines      Some of these will need a paper prescription and others can be bought over the counter. Ask your nurse if you have questions.     Bring a paper prescription for each of these medications     acetaminophen 325 MG tablet    aspirin 325 MG EC tablet    oxyCODONE IR 5 MG tablet    senna-docusate 8.6-50 MG per tablet                Protect others around you: Learn how to safely use, store and throw away your medicines at www.disposemymeds.org.        ANTIBIOTIC INSTRUCTION     You've Been Prescribed an Antibiotic - Now What?  Your healthcare team thinks that you or your loved one might have an infection. Some infections can be treated with antibiotics, which are powerful, life-saving drugs. Like all medications, antibiotics have side effects and should only be used when necessary. There are some important things you should know about your antibiotic treatment.      Your healthcare team may run tests before you start taking an antibiotic.    Your team may take samples (e.g.,  from your blood, urine or other areas) to run tests to look for bacteria. These test can be important to determine if you need an antibiotic at all and, if you do, which antibiotic will work best.      Within a few days, your healthcare team might change or even stop your antibiotic.    Your team may start you on an antibiotic while they are working to find out what is making you sick.    Your team might change your antibiotic because test results show that a different antibiotic would be better to treat your infection.    In some cases, once your team has more information, they learn that you do not need an antibiotic at all. They may find out that you don't have an infection, or that the antibiotic you're taking won't work against your infection. For example, an infection caused by a virus can't be treated with antibiotics. Staying on an antibiotic when you don't need it is more likely to be harmful than helpful.      You may experience side effects from your antibiotic.    Like all medications, antibiotics have side effects. Some of these can be serious.    Let you healthcare team know if you have any known allergies when you are admitted to the hospital.    One significant side effect of nearly all antibiotics is the risk of severe and sometimes deadly diarrhea caused by Clostridium difficile (C. Difficile). This occurs when a person takes antibiotics because some good germs are destroyed. Antibiotic use allows C. diificile to take over, putting patients at high risk for this serious infection.    As a patient or caregiver, it is important to understand your or your loved one's antibiotic treatment. It is especially important for caregivers to speak up when patients can't speak for themselves. Here are some important questions to ask your healthcare team.    What infection is this antibiotic treating and how do you know I have that infection?    What side effects might occur from this antibiotic?    How long  will I need to take this antibiotic?    Is it safe to take this antibiotic with other medications or supplements (e.g., vitamins) that I am taking?     Are there any special directions I need to know about taking this antibiotic? For example, should I take it with food?    How will I be monitored to know whether my infection is responding to the antibiotic?    What tests may help to make sure the right antibiotic is prescribed for me?      Information provided by:  www.cdc.gov/getsmart  U.S. Department of Health and Human Services  Centers for disease Control and Prevention  National Center for Emerging and Zoonotic Infectious Diseases  Division of Healthcare Quality Promotion        Information about OPIOIDS     PRESCRIPTION OPIOIDS: WHAT YOU NEED TO KNOW    Prescription opioids can be used to help relieve moderate to severe pain and are often prescribed following a surgery or injury, or for certain health conditions. These medications can be an important part of treatment but also come with serious risks. It is important to work with your health care provider to make sure you are getting the safest, most effective care.    WHAT ARE THE RISKS AND SIDE EFFECTS OF OPIOID USE?  Prescription opioids carry serious risks of addiction and overdose, especially with prolonged use. An opioid overdose, often marked by slowed breathing can cause sudden death. The use of prescription opioids can have a number of side effects as well, even when taken as directed:      Tolerance - meaning you might need to take more of a medication for the same pain relief    Physical dependence - meaning you have symptoms of withdrawal when a medication is stopped    Increased sensitivity to pain    Constipation    Nausea, vomiting, and dry mouth    Sleepiness and dizziness    Confusion    Depression    Low levels of testosterone that can result in lower sex drive, energy, and strength    Itching and sweating    RISKS ARE GREATER  WITH:    History of drug misuse, substance use disorder, or overdose    Mental health conditions (such as depression or anxiety)    Sleep apnea    Older age (65 years or older)    Pregnancy    Avoid alcohol while taking prescription opioids.   Also, unless specifically advised by your health care provider, medications to avoid include:    Benzodiazepines (such as Xanax or Valium)    Muscle relaxants (such as Soma or Flexeril)    Hypnotics (such as Ambien or Lunesta)    Other prescription opioids    KNOW YOUR OPTIONS:  Talk to your health care provider about ways to manage your pain that do not involve prescription opioids. Some of these options may actually work better and have fewer risks and side effects:    Pain relievers such as acetaminophen, ibuprofen, and naproxen    Some medications that are also used for depression or seizures    Physical therapy and exercise    Cognitive behavioral therapy, a psychological, goal-directed approach, in which patients learn how to modify physical, behavioral, and emotional triggers of pain and stress    IF YOU ARE PRESCRIBED OPIOIDS FOR PAIN:    Never take opioids in greater amounts or more often than prescribed    Follow up with your primary health care provider and work together to create a plan on how to manage your pain.    Talk about ways to help manage your pain that do not involve prescription opioids    Talk about all concerns and side effects    Help prevent misuse and abuse    Never sell or share prescription opioids    Never use another person's prescription opioids    Store prescription opioids in a secure place and out of reach of others (this may include visitors, children, friends, and family)    Visit www.cdc.gov/drugoverdose to learn about risks of opioid abuse and overdose    If you believe you may be struggling with addiction, tell your health care provider and ask for guidance or call [facility]'s National Helpline at 0-220-780-HELP    LEARN MORE /  www.cdc.gov/drugoverdose/prescribing/guideline.html    Safely dispose of unused prescription opioids: Find your local drug take-back programs and more information about the importance of safe disposal at www.doseofreality.[place].gov             Medication List: This is a list of all your medications and when to take them. Check marks below indicate your daily home schedule. Keep this list as a reference.      Medications           Morning Afternoon Evening Bedtime As Needed    acetaminophen 325 MG tablet   Commonly known as:  TYLENOL   Take 2 tablets (650 mg) by mouth every 4 hours as needed for other (surgical pain)   Last time this was given:  975 mg on 2/2/2018  1:32 AM                                AMOXICILLIN PO   Take 2,000 mg by mouth daily as needed (1 hour prior to dental procedures)   Notes to Patient:  Take as needed before dental proceedures                                   aspirin 325 MG EC tablet   Take 1 tablet (325 mg) by mouth 2 times daily   Last time this was given:  325 mg on 2/2/2018  8:11 AM                                CALCIUM 1200 PO   Take 2 tablets by mouth daily.   Notes to Patient:  Take as previously perscribed                                DIOVAN HCT 80-12.5 MG per tablet   Take 1 tablet by mouth daily   Last time this was given:  1 tablet on 2/2/2018  8:11 AM   Generic drug:  valsartan-hydrochlorothiazide                                   LIPITOR 10 MG tablet   Take 10 mg by mouth daily.   Last time this was given:  10 mg on 2/2/2018  8:11 AM   Generic drug:  atorvastatin                                   oxyCODONE IR 5 MG tablet   Commonly known as:  ROXICODONE   Take 1-2 tablets (5-10 mg) by mouth every 4 hours as needed for moderate to severe pain (Take one tab for moderate pain, two tabs for severe pain)   Last time this was given:  5 mg on 2/1/2018 10:05 PM                                senna-docusate 8.6-50 MG per tablet   Commonly known as:  SENOKOT-S;PERICOLACE   Take 1-2  tablets by mouth 2 times daily   Last time this was given:  2 tablets on 2/2/2018  8:11 AM                                ZOLPIDEM TARTRATE PO   Take 5 mg by mouth nightly as needed for sleep   Last time this was given:  5 mg on 2/1/2018  8:25 PM

## 2018-01-30 NOTE — BRIEF OP NOTE
[facility] Brief Operative Note    Pre-operative diagnosis: djd   Post-operative diagnosis * No post-op diagnosis entered *  Left knee osteoarthritis   Procedure: Procedure(s):  LEFT TOTAL KNEE ARTHROPLASTY  - Wound Class: I-Clean   Surgeon(s): Surgeon(s) and Role:     * Diana Mcclellan MD - Primary     * Kenyon Jimenez PA-C - Assisting   Estimated blood loss: 5 mL    Specimens: * No specimens in log *   Findings: Left knee osteoarthritis

## 2018-01-30 NOTE — IP AVS SNAPSHOT
[address] [facility]    [address] [place] [address]    Phone:  [phone]                                       After Visit Summary   1/30/2018    Gely Tam    MRN: [id number]           After Visit Summary Signature Page     I have received my discharge instructions, and my questions have been answered. I have discussed any challenges I see with this plan with the nurse or doctor.    ..........................................................................................................................................  Patient/Patient Representative Signature      ..........................................................................................................................................  Patient Representative Print Name and Relationship to Patient    ..................................................               ................................................  Date                                            Time    ..........................................................................................................................................  Reviewed by Signature/Title    ...................................................              ..............................................  Date                                                            Time

## 2018-01-30 NOTE — IP AVS SNAPSHOT
"[place] [address] [facility]: [phone]                                              INTERAGENCY TRANSFER FORM - PHYSICIAN ORDERS   2018                    Hospital Admission Date: 2018  MARVA JONES   : 1934  Sex: Female        Attending Provider: Diana Mcclellan MD     Allergies:  No Known Allergies    Infection:  None   Service:  SURGERY    Ht:  1.651 m (5' 5\")   Wt:  62.6 kg (138 lb)   Admission Wt:  62.6 kg (138 lb)    BMI:  22.96 kg/m 2   BSA:  1.69 m 2            Patient PCP Information     Provider PCP Type    Vel Calvert MD General      ED Clinical Impression     Diagnosis Description Comment Added By Time Added    Status post total left knee replacement [Z96.652] Status post total left knee replacement [Z96.652]  Abby Kim PA-C 2018 10:44 AM      Hospital Problems as of 2018              Priority Class Noted POA    Total knee replacement status Medium  2018 Yes      Non-Hospital Problems as of 2018              Priority Class Noted    HTN (hypertension) Medium  2012    DJD (degenerative joint disease) of knee Medium  2012    Lipid disorder Medium  2012    S/P total hip arthroplasty Medium  10/26/2012    Hypotension Medium  10/27/2012      Code Status History     Date Active Date Inactive Code Status Order ID Comments User Context    10/29/2012  8:55 AM 2018 12:08 PM Full Code [id number]  Jethro Clarke PA-C Outpatient    10/26/2012 12:54 PM 10/29/2012  8:55 AM Full Code [id number]  Madyson Abdi RN Inpatient         Medication Review      START taking        Dose / Directions Comments    acetaminophen 325 MG tablet   Commonly known as:  TYLENOL        Dose:  650 mg   Take 2 tablets (650 mg) by mouth every 4 hours as needed for other (surgical pain)   Quantity:  60 tablet   Refills:  0        aspirin 325 MG EC tablet   Replaces:  aspirin 81 MG chewable tablet        Dose:  325 mg   Take 1 " tablet (325 mg) by mouth 2 times daily   Quantity:  60 tablet   Refills:  0        oxyCODONE IR 5 MG tablet   Commonly known as:  ROXICODONE        Dose:  5-10 mg   Take 1-2 tablets (5-10 mg) by mouth every 4 hours as needed for moderate to severe pain (Take one tab for moderate pain, two tabs for severe pain)   Quantity:  45 tablet   Refills:  0        senna-docusate 8.6-50 MG per tablet   Commonly known as:  SENOKOT-S;PERICOLACE        Dose:  1-2 tablet   Take 1-2 tablets by mouth 2 times daily   Quantity:  45 tablet   Refills:  0          CONTINUE these medications which have NOT CHANGED        Dose / Directions Comments    AMOXICILLIN PO   Notes to Patient:  Take as needed before dental proceedures        Dose:  2000 mg   Take 2,000 mg by mouth daily as needed (1 hour prior to dental procedures)   Refills:  0        CALCIUM 1200 PO   Notes to Patient:  Take as previously perscribed        Dose:  2 tablet   Take 2 tablets by mouth daily.   Refills:  0        DIOVAN HCT 80-12.5 MG per tablet   Generic drug:  valsartan-hydrochlorothiazide        Dose:  1 tablet   Take 1 tablet by mouth daily   Quantity:  30 tablet   Refills:  0        LIPITOR 10 MG tablet   Generic drug:  atorvastatin        Dose:  10 mg   Take 10 mg by mouth daily.   Refills:  0        ZOLPIDEM TARTRATE PO        Dose:  5 mg   Take 5 mg by mouth nightly as needed for sleep   Refills:  0          STOP taking     aspirin 81 MG chewable tablet   Replaced by:  aspirin 325 MG EC tablet                   Summary of Visit     Reason for your hospital stay       Left total knee replacement             After Care     Activity - Up ad jonny           Advance Diet as Tolerated       Follow this diet upon discharge: Orders Placed This Encounter      Room Service      Regular Diet Adult       Encourage PO fluids           General info for SNF       Length of Stay Estimate: Short Term Care: Estimated # of Days <30  Condition at Discharge: Improving  Level of  care:skilled   Rehabilitation Potential: Good  Admission H&P remains valid and up-to-date: Yes  Recent Chemotherapy: N/A  Use Nursing Home Standing Orders: Yes       Mantoux instructions       Give two-step Mantoux (PPD) Per Facility Policy Yes       Weight bearing status           Wound care (specify)       Site:   Left knee  Instructions:  Leave dressing intact, reinforce if needed             Referrals     Occupational Therapy Adult Consult       Evaluate and treat as clinically indicated.    Reason:  Post op total hip       Physical Therapy Adult Consult       Evaluate and treat as clinically indicated.    Reason:  Post op total hip             Follow-Up Appointment Instructions     Future Labs/Procedures    Follow Up and recommended labs and tests     Comments:    Follow up with Dr. Mcclellan and Alvino FIGUEROA  in 2 weeks.  No follow up labs or test are needed.  Call for appointment [phone]      Follow-Up Appointment Instructions     Follow Up and recommended labs and tests       Follow up with Dr. Vinita FIGUEROA  in 2 weeks.  No follow up labs or test are needed.  Call for appointment [phone]             Statement of Approval     Ordered          02/02/18 1050  I have reviewed and agree with all the recommendations and orders detailed in this document.  EFFECTIVE NOW     Approved and electronically signed by:  Abby Kim PA-C

## 2018-01-31 ENCOUNTER — APPOINTMENT (OUTPATIENT)
Dept: PHYSICAL THERAPY | Facility: CLINIC | Age: 83
DRG: 470 | End: 2018-01-31
Attending: ORTHOPAEDIC SURGERY
Payer: MEDICARE

## 2018-01-31 LAB
GLUCOSE SERPL-MCNC: 123 MG/DL (ref 70–99)
HGB BLD-MCNC: 10.8 G/DL (ref 11.7–15.7)

## 2018-01-31 PROCEDURE — A9270 NON-COVERED ITEM OR SERVICE: HCPCS | Mod: GY | Performed by: PHYSICIAN ASSISTANT

## 2018-01-31 PROCEDURE — 40000193 ZZH STATISTIC PT WARD VISIT: Performed by: PHYSICAL THERAPY ASSISTANT

## 2018-01-31 PROCEDURE — 40000193 ZZH STATISTIC PT WARD VISIT: Performed by: PHYSICAL THERAPIST

## 2018-01-31 PROCEDURE — 12000007 ZZH R&B INTERMEDIATE

## 2018-01-31 PROCEDURE — 97116 GAIT TRAINING THERAPY: CPT | Mod: GP | Performed by: PHYSICAL THERAPIST

## 2018-01-31 PROCEDURE — 97530 THERAPEUTIC ACTIVITIES: CPT | Mod: GP | Performed by: PHYSICAL THERAPIST

## 2018-01-31 PROCEDURE — 97116 GAIT TRAINING THERAPY: CPT | Mod: GP | Performed by: PHYSICAL THERAPY ASSISTANT

## 2018-01-31 PROCEDURE — 97110 THERAPEUTIC EXERCISES: CPT | Mod: GP | Performed by: PHYSICAL THERAPY ASSISTANT

## 2018-01-31 PROCEDURE — 25000132 ZZH RX MED GY IP 250 OP 250 PS 637: Mod: GY | Performed by: ORTHOPAEDIC SURGERY

## 2018-01-31 PROCEDURE — 27210995 ZZH RX 272: Performed by: PHYSICIAN ASSISTANT

## 2018-01-31 PROCEDURE — 25000128 H RX IP 250 OP 636: Performed by: PHYSICIAN ASSISTANT

## 2018-01-31 PROCEDURE — 97530 THERAPEUTIC ACTIVITIES: CPT | Mod: GP | Performed by: PHYSICAL THERAPY ASSISTANT

## 2018-01-31 PROCEDURE — A9270 NON-COVERED ITEM OR SERVICE: HCPCS | Mod: GY | Performed by: ORTHOPAEDIC SURGERY

## 2018-01-31 PROCEDURE — 25000132 ZZH RX MED GY IP 250 OP 250 PS 637: Mod: GY | Performed by: PHYSICIAN ASSISTANT

## 2018-01-31 PROCEDURE — 82947 ASSAY GLUCOSE BLOOD QUANT: CPT | Performed by: PHYSICIAN ASSISTANT

## 2018-01-31 PROCEDURE — 85018 HEMOGLOBIN: CPT | Performed by: PHYSICIAN ASSISTANT

## 2018-01-31 PROCEDURE — 36415 COLL VENOUS BLD VENIPUNCTURE: CPT | Performed by: PHYSICIAN ASSISTANT

## 2018-01-31 RX ADMIN — HYDROXYZINE HYDROCHLORIDE 10 MG: 10 TABLET ORAL at 06:06

## 2018-01-31 RX ADMIN — OXYCODONE HYDROCHLORIDE 10 MG: 5 TABLET ORAL at 19:11

## 2018-01-31 RX ADMIN — CEFAZOLIN SODIUM 1 G: 10 INJECTION, POWDER, FOR SOLUTION INTRAVENOUS at 01:23

## 2018-01-31 RX ADMIN — VALSARTAN AND HYDROCHLOROTHIAZIDE 1 TABLET: 80; 12.5 TABLET, FILM COATED ORAL at 08:31

## 2018-01-31 RX ADMIN — ATORVASTATIN CALCIUM 10 MG: 10 TABLET, FILM COATED ORAL at 08:30

## 2018-01-31 RX ADMIN — ASPIRIN 325 MG: 325 TABLET, DELAYED RELEASE ORAL at 08:29

## 2018-01-31 RX ADMIN — ASPIRIN 325 MG: 325 TABLET, DELAYED RELEASE ORAL at 22:03

## 2018-01-31 RX ADMIN — OXYCODONE HYDROCHLORIDE 5 MG: 5 TABLET ORAL at 03:54

## 2018-01-31 RX ADMIN — SODIUM CHLORIDE, POTASSIUM CHLORIDE, SODIUM LACTATE AND CALCIUM CHLORIDE: 600; 310; 30; 20 INJECTION, SOLUTION INTRAVENOUS at 03:59

## 2018-01-31 RX ADMIN — OXYCODONE HYDROCHLORIDE 10 MG: 5 TABLET ORAL at 08:34

## 2018-01-31 RX ADMIN — ACETAMINOPHEN 975 MG: 325 TABLET, FILM COATED ORAL at 10:42

## 2018-01-31 RX ADMIN — OXYCODONE HYDROCHLORIDE 10 MG: 5 TABLET ORAL at 14:42

## 2018-01-31 RX ADMIN — ACETAMINOPHEN 975 MG: 325 TABLET, FILM COATED ORAL at 17:40

## 2018-01-31 RX ADMIN — ACETAMINOPHEN 975 MG: 325 TABLET, FILM COATED ORAL at 01:23

## 2018-01-31 RX ADMIN — SENNOSIDES AND DOCUSATE SODIUM 2 TABLET: 8.6; 5 TABLET ORAL at 22:03

## 2018-01-31 RX ADMIN — OXYCODONE HYDROCHLORIDE 5 MG: 5 TABLET ORAL at 23:03

## 2018-01-31 RX ADMIN — OYSTER SHELL CALCIUM WITH VITAMIN D 2 TABLET: 500; 200 TABLET, FILM COATED ORAL at 08:30

## 2018-01-31 RX ADMIN — SENNOSIDES AND DOCUSATE SODIUM 2 TABLET: 8.6; 5 TABLET ORAL at 08:31

## 2018-01-31 NOTE — PLAN OF CARE
Problem: Knee Arthroplasty (Total, Partial) (Adult)  Goal: Signs and Symptoms of Listed Potential Problems Will be Absent, Minimized or Managed (Knee Arthroplasty)  Signs and symptoms of listed potential problems will be absent, minimized or managed by discharge/transition of care (reference Knee Arthroplasty (Total, Partial) (Adult) CPG).   Outcome: No Change  Pt had no knee pain until 1830 then pain rating was #5.  Given IV Dilaudid and Atarax.  Denies nausea.  Newberry and HVC patent.  CMS good to ft but (L) ft is more swollen and pt stated it was preop due to her Bakers' cyst.   Pedal pulses are done with Doppler.  Up out of bed with PT.

## 2018-01-31 NOTE — PROGRESS NOTES
"Gely Tam  2018  POD # patient s/p LTKA day 1  Admit Date:  2018      Doing well.  Objective: patient doing well overall. States she is very \" stiff\" in her knee and is ready to get up and move with therapy. Will plan on discharge to a TCU later this week.   Blood pressure 107/54, temperature 97.7  F (36.5  C), temperature source Oral, resp. rate 22, height 1.651 m (5' 5\"), weight 62.6 kg (138 lb), SpO2 95 %.    Temperatures:  Current - Temp: 97.7  F (36.5  C); Max - Temp  Av.4  F (36.3  C)  Min: 96.7  F (35.9  C)  Max: 97.9  F (36.6  C)  Pulse range: No Data Recorded  Blood pressure range: Systolic (24hrs), Av , Min:88 , Max:120   ; Diastolic (24hrs), Av, Min:51, Max:68    Exam:  CMS: intact  alert, stable, wound ok  Dressing c/d/i  Calf s/nt  NVID in the LLE   DF/PF   Communicates clearly with examiner    Labs:  Recent Labs   Lab Test  18   0645  10/28/12   0515  10/26/12   0545   POTASSIUM  3.4  3.4  3.5     Recent Labs   Lab Test  18   0536  18   1036  10/30/12   0658   HGB  10.8*  14.1  9.5*     Recent Labs   Lab Test  10/30/12   0658  10/29/12   0610  10/28/12   0515   INR  1.74*  2.10*  1.22*     Recent Labs   Lab Test  10/30/12   0658  10/29/12   0808  10/26/12   0545   PLT  166  143*  227       PLAN: patient will plan to discharge to rehab facility later this week.   She will begin working with therapy this morning.   No further complaints and doing well overall.     "

## 2018-01-31 NOTE — PLAN OF CARE
Problem: Knee Arthroplasty (Total, Partial) (Adult)  Goal: Signs and Symptoms of Listed Potential Problems Will be Absent, Minimized or Managed (Knee Arthroplasty)  Signs and symptoms of listed potential problems will be absent, minimized or managed by discharge/transition of care (reference Knee Arthroplasty (Total, Partial) (Adult) CPG).   Outcome: No Change  Pt a/o up with 1 walker belt. Voiding eatting,cms intact,

## 2018-01-31 NOTE — PLAN OF CARE
Problem: Patient Care Overview  Goal: Plan of Care/Patient Progress Review  Outcome: Improving  A&Ox4. POD 1 L TKA. VSS on 2L via NC. Assist of 1 walker and gait belt. Pain controlled with Oxycodone and scheduled tylenol. Pt slightly hypotensive tonight 88-90, asymptomatic. Urine output barely adequate for shift. IV fluids running and PO intake good. Pt complained of full bladder feeling, bladder scanned for 22 mL.  Cardiac ausculataion irregular, 12 lead ECG from 1/9/18 showed SR with PJCS and BBB. [facility] d/c, due to void HVC intact and draining. Dressing CDI. CMS intact. 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 Will continue to monitor.

## 2018-01-31 NOTE — PLAN OF CARE
Problem: Patient Care Overview  Goal: Plan of Care/Patient Progress Review  Discharge Planner PT   Patient plan for discharge: TCU  Current status: Pt very anxious about activity, needing much encouragement, able to do more than she thinks, min A for bed mob and transfers, mod off low toilet, min A for amb with multiple stops due to anxiety, able to progress gait mechanics, amb 140' with min A  Barriers to return to prior living situation: stairs to enter, A for mob, fall risk, lives alone  Recommendations for discharge: TCU  Rationale for recommendations: to maximize functional mob I and safety to allow safe transfer to home alone at [facility]       Entered by: MICHELLE CALDWELL 01/31/2018 1:19 PM

## 2018-02-01 ENCOUNTER — APPOINTMENT (OUTPATIENT)
Dept: PHYSICAL THERAPY | Facility: CLINIC | Age: 83
DRG: 470 | End: 2018-02-01
Attending: ORTHOPAEDIC SURGERY
Payer: MEDICARE

## 2018-02-01 LAB
GLUCOSE BLDC GLUCOMTR-MCNC: 111 MG/DL (ref 70–99)
HGB BLD-MCNC: 11 G/DL (ref 11.7–15.7)

## 2018-02-01 PROCEDURE — 40000894 ZZH STATISTIC OT IP EVAL DEFER

## 2018-02-01 PROCEDURE — 97110 THERAPEUTIC EXERCISES: CPT | Mod: GP | Performed by: PHYSICAL THERAPY ASSISTANT

## 2018-02-01 PROCEDURE — 25000132 ZZH RX MED GY IP 250 OP 250 PS 637: Mod: GY | Performed by: ORTHOPAEDIC SURGERY

## 2018-02-01 PROCEDURE — A9270 NON-COVERED ITEM OR SERVICE: HCPCS | Mod: GY | Performed by: ORTHOPAEDIC SURGERY

## 2018-02-01 PROCEDURE — 85018 HEMOGLOBIN: CPT | Performed by: PHYSICIAN ASSISTANT

## 2018-02-01 PROCEDURE — 97116 GAIT TRAINING THERAPY: CPT | Mod: GP | Performed by: PHYSICAL THERAPY ASSISTANT

## 2018-02-01 PROCEDURE — 36415 COLL VENOUS BLD VENIPUNCTURE: CPT | Performed by: PHYSICIAN ASSISTANT

## 2018-02-01 PROCEDURE — 40000193 ZZH STATISTIC PT WARD VISIT: Performed by: PHYSICAL THERAPY ASSISTANT

## 2018-02-01 PROCEDURE — 00000146 ZZHCL STATISTIC GLUCOSE BY METER IP

## 2018-02-01 PROCEDURE — 12000007 ZZH R&B INTERMEDIATE

## 2018-02-01 PROCEDURE — 97530 THERAPEUTIC ACTIVITIES: CPT | Mod: GP | Performed by: PHYSICAL THERAPY ASSISTANT

## 2018-02-01 PROCEDURE — 25000132 ZZH RX MED GY IP 250 OP 250 PS 637: Mod: GY | Performed by: PHYSICIAN ASSISTANT

## 2018-02-01 PROCEDURE — A9270 NON-COVERED ITEM OR SERVICE: HCPCS | Mod: GY | Performed by: PHYSICIAN ASSISTANT

## 2018-02-01 RX ADMIN — OXYCODONE HYDROCHLORIDE 5 MG: 5 TABLET ORAL at 12:37

## 2018-02-01 RX ADMIN — ACETAMINOPHEN 975 MG: 325 TABLET, FILM COATED ORAL at 09:13

## 2018-02-01 RX ADMIN — ACETAMINOPHEN 975 MG: 325 TABLET, FILM COATED ORAL at 17:53

## 2018-02-01 RX ADMIN — OYSTER SHELL CALCIUM WITH VITAMIN D 2 TABLET: 500; 200 TABLET, FILM COATED ORAL at 08:27

## 2018-02-01 RX ADMIN — SENNOSIDES AND DOCUSATE SODIUM 1 TABLET: 8.6; 5 TABLET ORAL at 08:29

## 2018-02-01 RX ADMIN — ASPIRIN 325 MG: 325 TABLET, DELAYED RELEASE ORAL at 08:28

## 2018-02-01 RX ADMIN — ZOLPIDEM TARTRATE 5 MG: 5 TABLET, FILM COATED ORAL at 20:25

## 2018-02-01 RX ADMIN — ACETAMINOPHEN 975 MG: 325 TABLET, FILM COATED ORAL at 02:14

## 2018-02-01 RX ADMIN — ATORVASTATIN CALCIUM 10 MG: 10 TABLET, FILM COATED ORAL at 08:28

## 2018-02-01 RX ADMIN — OXYCODONE HYDROCHLORIDE 5 MG: 5 TABLET ORAL at 17:53

## 2018-02-01 RX ADMIN — SENNOSIDES AND DOCUSATE SODIUM 2 TABLET: 8.6; 5 TABLET ORAL at 20:21

## 2018-02-01 RX ADMIN — ASPIRIN 325 MG: 325 TABLET, DELAYED RELEASE ORAL at 20:21

## 2018-02-01 RX ADMIN — OXYCODONE HYDROCHLORIDE 5 MG: 5 TABLET ORAL at 22:05

## 2018-02-01 RX ADMIN — VALSARTAN AND HYDROCHLOROTHIAZIDE 1 TABLET: 80; 12.5 TABLET, FILM COATED ORAL at 08:28

## 2018-02-01 NOTE — PROGRESS NOTES
"Gely Tam  2018  POD # 2 s/p LTKA  Admit Date:  2018      Doing well.  Objective: patient doing well. No chest pain or shortness of breath. Ready to work with therapy and plans on going to rehab facility.   Blood pressure 119/70, pulse 72, temperature 98.2  F (36.8  C), temperature source Oral, resp. rate 16, height 1.651 m (5' 5\"), weight 62.6 kg (138 lb), SpO2 95 %.    Temperatures:  Current - Temp: 98.2  F (36.8  C); Max - Temp  Av.9  F (36.6  C)  Min: 97.6  F (36.4  C)  Max: 98.2  F (36.8  C)  Pulse range: Pulse  Av  Min: 72  Max: 72  Blood pressure range: Systolic (24hrs), Av , Min:95 , Max:119   ; Diastolic (24hrs), Av, Min:50, Max:73    Exam:  CMS: intact  alert, stable, wound ok  Dressing c/d/i  Calf s/nt  NVID in the LLE  Communicates clearly with examiner    Labs:  Recent Labs   Lab Test  18   0645  10/28/12   0515  10/26/12   0545   POTASSIUM  3.4  3.4  3.5     Recent Labs   Lab Test  18   0640  18   0536  18   1036   HGB  11.0*  10.8*  14.1     Recent Labs   Lab Test  10/30/12   0658  10/29/12   0610  10/28/12   0515   INR  1.74*  2.10*  1.22*     Recent Labs   Lab Test  10/30/12   0658  10/29/12   0808  10/26/12   0545   PLT  166  143*  227       PLAN: patient will discharge to TCU. Continue working with therapy and continue taking aspirin for DVT ppx.   We will continue to follow closely.     "

## 2018-02-01 NOTE — PROGRESS NOTES
Care Transition Initial Assessment - KYLE  Reason For Consult: discharge planning  Met with: Patient    Active Problems:    Total knee replacement status         DATA  Lives With: alone  Living Arrangements: house    Identified issues/concerns regarding health management: SW was consulted for discharge planning. Patient is Gely, an 83 year-old female who was admitted on 1/30 for a total knee replacement. Her tentative discharge date is 2/2. KYLE met with patient and reviewed medical record. Per PT marleen, prior to this admission, patient lived alone in a house with 3 stairs to enter and 1 stair within. She used a rolling walker, a straight cane, and a raised toilet. She was otherwise independent with ADLs. Per MD, the recommendation is TCU. Patient is requesting a private room at [facility] or [facility]. She verbalized understanding of private room fees. KYLE placed this referral via DOD.        Transportation Available: car    ASSESSMENT  Cognitive Status:  awake and alert  Concerns to be addressed: Discharge planning.     PLAN  Financial costs for the patient includes Private room fees.  Patient given options and choices for discharge TCU choices.  Patient/family is agreeable to the plan?  Yes  Patient Goals and Preferences: Discharge to [facility].  Patient anticipates discharging to:  TCU.    MARIIA Quintero, LICSW

## 2018-02-01 NOTE — PLAN OF CARE
Problem: Patient Care Overview  Goal: Plan of Care/Patient Progress Review  OT: Orders received, chart reviewed. Per interdisciplinary notes, pt to d/c to TCU. OT met with pt to discuss role of OT and deferral to TCU for initial eval. Pt verbalized understanding. OT to complete orders at this time.

## 2018-02-01 NOTE — PLAN OF CARE
Problem: Patient Care Overview  Goal: Plan of Care/Patient Progress Review  Outcome: Improving  POD#2 L TKA. With immobilizer on . A&O, VSS on RA. Pain well controlled with oxycodone. Changed dressing and pulled hemovac(per Alvino VALENZUELA). CMS +. Reg deit. SBA with walker and GB. Pt to D/C to TCU.

## 2018-02-01 NOTE — PLAN OF CARE
Problem: Patient Care Overview  Goal: Plan of Care/Patient Progress Review  Discharge Planner PT   Patient plan for discharge: TCU  Current status: Pt performed bed mobility with min assist and sit to/from stand transfers with SBA.  Gait training x 100 ft using wheeled walker and CGA.  Pt a little more shaky during gait today due to pain and fatigue.  Performed 3 stairs x 1 trial using bilateral rails with min assist.  Knee AAROM is 5-78 degrees  Barriers to return to prior living situation: stairs to enter, A for mob, fall risk, lives alone  Recommendations for discharge: TCU  Rationale for recommendations: to maximize functional mob I and safety to allow safe transfer to home alone at [facility]       Entered by: Madison Dias 02/01/2018 12:18 PM

## 2018-02-01 NOTE — PLAN OF CARE
Problem: Patient Care Overview  Goal: Plan of Care/Patient Progress Review  Outcome: Improving  A&Ox4. POD 2 Left TTL knee replacement. VSS. CMS intact ex L foot swollen, MD aware. Hemovac patent, scant SS drainage. Voiding in BR, AUOP. Ambulating A1 gb/walker, ARVIND hose on. Tolerating regular diet. IV SL. Planfor OT conuslt today. DC pending to TCU.

## 2018-02-01 NOTE — PLAN OF CARE
Problem: Knee Arthroplasty (Total, Partial) (Adult)  Goal: Signs and Symptoms of Listed Potential Problems Will be Absent, Minimized or Managed (Knee Arthroplasty)  Signs and symptoms of listed potential problems will be absent, minimized or managed by discharge/transition of care (reference Knee Arthroplasty (Total, Partial) (Adult) CPG).   Outcome: Improving  Patient up with assist of 1 and walker.  Pain managed with Oxycodone.  VSS on RA.  A/Ox4.  Dressing CDI.  CMS intact.  Hemovac intact and patent.  Voiding adequately.  Good PO intake.  Plan to D/C to TCU.

## 2018-02-02 ENCOUNTER — APPOINTMENT (OUTPATIENT)
Dept: PHYSICAL THERAPY | Facility: CLINIC | Age: 83
DRG: 470 | End: 2018-02-02
Attending: ORTHOPAEDIC SURGERY
Payer: MEDICARE

## 2018-02-02 VITALS
OXYGEN SATURATION: 94 % | BODY MASS INDEX: 22.99 KG/M2 | TEMPERATURE: 97.9 F | DIASTOLIC BLOOD PRESSURE: 60 MMHG | HEART RATE: 79 BPM | HEIGHT: 65 IN | WEIGHT: 138 LBS | SYSTOLIC BLOOD PRESSURE: 109 MMHG | RESPIRATION RATE: 18 BRPM

## 2018-02-02 PROCEDURE — 97116 GAIT TRAINING THERAPY: CPT | Mod: GP

## 2018-02-02 PROCEDURE — 40000193 ZZH STATISTIC PT WARD VISIT

## 2018-02-02 PROCEDURE — A9270 NON-COVERED ITEM OR SERVICE: HCPCS | Mod: GY | Performed by: PHYSICIAN ASSISTANT

## 2018-02-02 PROCEDURE — 25000132 ZZH RX MED GY IP 250 OP 250 PS 637: Mod: GY | Performed by: PHYSICIAN ASSISTANT

## 2018-02-02 PROCEDURE — A9270 NON-COVERED ITEM OR SERVICE: HCPCS | Mod: GY | Performed by: ORTHOPAEDIC SURGERY

## 2018-02-02 PROCEDURE — 97110 THERAPEUTIC EXERCISES: CPT | Mod: GP

## 2018-02-02 PROCEDURE — 25000132 ZZH RX MED GY IP 250 OP 250 PS 637: Mod: GY | Performed by: ORTHOPAEDIC SURGERY

## 2018-02-02 RX ORDER — ASPIRIN 325 MG
325 TABLET, DELAYED RELEASE (ENTERIC COATED) ORAL 2 TIMES DAILY
Qty: 60 TABLET | Refills: 0 | Status: SHIPPED | OUTPATIENT
Start: 2018-02-02 | End: 2022-05-04

## 2018-02-02 RX ORDER — OXYCODONE HYDROCHLORIDE 5 MG/1
5-10 TABLET ORAL EVERY 4 HOURS PRN
Qty: 45 TABLET | Refills: 0 | Status: SHIPPED | OUTPATIENT
Start: 2018-02-02 | End: 2022-05-04

## 2018-02-02 RX ORDER — ACETAMINOPHEN 325 MG/1
650 TABLET ORAL EVERY 4 HOURS PRN
Qty: 60 TABLET | Refills: 0 | Status: SHIPPED | OUTPATIENT
Start: 2018-02-02

## 2018-02-02 RX ORDER — AMOXICILLIN 250 MG
1-2 CAPSULE ORAL 2 TIMES DAILY
Qty: 45 TABLET | Refills: 0 | Status: SHIPPED | OUTPATIENT
Start: 2018-02-02 | End: 2018-02-04

## 2018-02-02 RX ADMIN — OYSTER SHELL CALCIUM WITH VITAMIN D 2 TABLET: 500; 200 TABLET, FILM COATED ORAL at 08:11

## 2018-02-02 RX ADMIN — ATORVASTATIN CALCIUM 10 MG: 10 TABLET, FILM COATED ORAL at 08:11

## 2018-02-02 RX ADMIN — ASPIRIN 325 MG: 325 TABLET, DELAYED RELEASE ORAL at 08:11

## 2018-02-02 RX ADMIN — VALSARTAN AND HYDROCHLOROTHIAZIDE 1 TABLET: 80; 12.5 TABLET, FILM COATED ORAL at 08:11

## 2018-02-02 RX ADMIN — SENNOSIDES AND DOCUSATE SODIUM 2 TABLET: 8.6; 5 TABLET ORAL at 08:11

## 2018-02-02 RX ADMIN — ACETAMINOPHEN 975 MG: 325 TABLET, FILM COATED ORAL at 01:32

## 2018-02-02 NOTE — PLAN OF CARE
Problem: Knee Arthroplasty (Total, Partial) (Adult)  Goal: Signs and Symptoms of Listed Potential Problems Will be Absent, Minimized or Managed (Knee Arthroplasty)  Signs and symptoms of listed potential problems will be absent, minimized or managed by discharge/transition of care (reference Knee Arthroplasty (Total, Partial) (Adult) CPG).   Outcome: Improving  Patient up with assist of 1 and walker.  Pain managed with Oxycodone.  VSS on RA.  A/Ox4.  Dressing CDI.  CMS intact.  Compression stockings on.  Voiding adequately.  Good PO intake.  Plan to D/C to TCU tomorrow.

## 2018-02-02 NOTE — PROGRESS NOTES
[place] [facility]  PRIMARY CARE PROVIDER AND CLINIC:  Vel Calvert [address] / [facility]*  Chief Complaint   Patient presents with     Hospital F/U       HPI:    Gely Tam is a 83 year old  (4/30/1934),admitted to the  TCU from [facility].  Hospital stay 01/30/2018 through 02/02/2018.  Admitted to this facility for  rehab, medical management and nursing care.  HPI information obtained from: facility chart records, facility staff, patient report and [facility] chart review.    She is status post left TKA for osteoarthritis 1/30/2018 by Dr Mcclellan. Tolerated the procedure well. ASA bid started for DVT prophylaxis. Hgb 11.0 at discharge.       Current issues are:         Primary osteoarthritis of left knee-reports feeling well. Had increased pain yesterday after activity, less pain today. Using oxycodone and ice with relief. Up with walker and assistance.   Status post total left knee replacement  Essential hypertension-BPs: : 97/60, 90/51, 93/54   HR: 71-89. Denies feeling dizzy or lightheaded. Good appetite and drinking plenty of fluids.   Anemia due to blood loss, acute-pre op Hgb 14.1  Slow transit constipation-reports BM X 2 yesterday.         CODE STATUS/ADVANCE DIRECTIVES DISCUSSION:   CPR/Full code   Patient's living condition: lives alone    ALLERGIES:Review of patient's allergies indicates no known allergies.  PAST MEDICAL HISTORY:  has a past medical history of Arthritis; HTN (hypertension); and Hyperlipemia.  PAST SURGICAL HISTORY:  has a past surgical history that includes Laparoscopic salpingotomy; Hysterectomy (1981); orthopedic surgery (2010); Arthroplasty hip (10/26/2012); GYN surgery (1981); Soft tissue surgery (1984 vein ligation); and Arthroplasty knee (Left, 1/30/2018).  FAMILY HISTORY: family history includes Breast Cancer in an other family member.  SOCIAL HISTORY:  reports that she  "quit smoking about 23 years ago. She has a 30.00 pack-year smoking history. She has never used smokeless tobacco. She reports that she drinks alcohol. She reports that she does not use illicit drugs.    Post Discharge Medication Reconciliation Status: discharge medications reconciled and changed, per note/orders (see AVS).  Current Outpatient Prescriptions   Medication Sig Dispense Refill     senna-docusate (SENOKOT-S;PERICOLACE) 8.6-50 MG per tablet Take 1 tablet by mouth 2 times daily       oxyCODONE IR (ROXICODONE) 5 MG tablet Take 1-2 tablets (5-10 mg) by mouth every 4 hours as needed for moderate to severe pain (Take one tab for moderate pain, two tabs for severe pain) 45 tablet 0     acetaminophen (TYLENOL) 325 MG tablet Take 2 tablets (650 mg) by mouth every 4 hours as needed for other (surgical pain) 60 tablet 0     aspirin  MG EC tablet Take 1 tablet (325 mg) by mouth 2 times daily 60 tablet 0     AMOXICILLIN PO Take 2,000 mg by mouth daily as needed (1 hour prior to dental procedures)       ZOLPIDEM TARTRATE PO Take 5 mg by mouth nightly as needed for sleep       valsartan-hydrochlorothiazide (DIOVAN HCT) 80-12.5 MG per tablet Take 1 tablet by mouth daily 30 tablet      atorvastatin (LIPITOR) 10 MG tablet Take 10 mg by mouth daily.       Calcium Carbonate-Vit D-Min (CALCIUM 1200 PO) Take 2 tablets by mouth daily.           ROS:  10 point ROS of systems including Constitutional, Eyes, Respiratory, Cardiovascular, Gastroenterology, Genitourinary, Integumentary, Muscularskeletal, Psychiatric were all negative except for pertinent positives noted in my HPI.    Exam:  BP 97/60  Pulse 89  Temp 98.4  F (36.9  C)  Resp 18  Ht 5' 5\" (1.651 m)  Wt 142 lb 12.8 oz (64.8 kg)  SpO2 97%  BMI 23.76 kg/m2  GENERAL APPEARANCE:  Alert, in no distress, appears healthy  ENT:  Mouth and posterior oropharynx normal, moist mucous membranes, [place]  EYES:  EOM normal, conjunctiva and lids normal, PERRL  NECK:  No " adenopathy,masses or thyromegaly  RESP:  respiratory effort and palpation of chest normal, lungs clear to auscultation , no respiratory distress  CV:  Palpation and auscultation of heart done , regular rate and rhythm, no murmur, +2 pedal pulses, peripheral edema trace  in both LE  ABDOMEN:  normal bowel sounds, soft, nontender, no hepatosplenomegaly or other masses  M/S:   gait steady with walker. Decreased ROM and mild edema left knee, no warmth. Good strength of all extremities.   SKIN:  dressing to left knee clean,dry, intact. No visible erythema. No rashes or open areas  PSYCH:  oriented X 3, normal insight, judgement and memory    Lab/Diagnostic data:  Lab Results   Component Value Date    POTASSIUM 3.4 01/30/2018     Lab Results   Component Value Date    CR 0.65 01/30/2018     Lab Results   Component Value Date     01/31/2018     Lab Results   Component Value Date    HGB 11.0 02/01/2018     [facility] labs:    CBC W PLT NO DIFF (01/09/2018 11:52 AM)  CBC W PLT NO DIFF (01/09/2018 11:52 AM)   Component Value Ref Range   WHITE BLOOD COUNT  8.0 4.5 - 11.0 thou/cu mm   RED BLOOD COUNT  4.59 4.00 - 5.20 mil/cu mm   HEMOGLOBIN  14.3 12.0 - 16.0 g/dL   HEMATOCRIT  43.3 33.0 - 51.0 %   MCV  94 80 - 100 fL   MCH  31.2 26.0 - 34.0 pg   MCHC  33.0 32.0 - 36.0 g/dL   RDW  13.5 11.5 - 15.5 %   PLATELET COUNT  309 140 - 440 thou/cu mm   MPV  10.6 6.5 - 11.0 fL     BASIC METABOLIC PANEL (01/09/2018 11:52 AM)  BASIC METABOLIC PANEL (01/09/2018 11:52 AM)   Component Value Ref Range   SODIUM 140 135 - 145 mmol/L   POTASSIUM 4.2 3.5 - 5.0 mmol/L   CHLORIDE 100 98 - 110 mmol/L   CO2,TOTAL 29 21 - 31 mmol/L   ANION GAP 11 5 - 18    GLUCOSE 109 (H) 65 - 100 mg/dL   CALCIUM 10.3 8.5 - 10.5 mg/dL   BUN 17 8 - 25 mg/dL   CREATININE 0.73 0.57 - 1.11 mg/dL   BUN/CREAT RATIO  23 (H) 10 - 20    GFR if African American >60 >60 ml/min/1.73m2   GFR if not African American >60 >60 ml/min/1.73m2       ASSESSMENT /  PLAN:  (M17.12) Primary osteoarthritis of left knee  (primary encounter diagnosis)  (Z96.652) Status post total left knee replacement  Comment: pain controlled. Mobility improving. No signs of infection   Plan: PHYSICAL THERAPY/OT. Continue tylenol, oxycodone. Continue ASA for DVT prophylaxis. Daily dry dressing change. Anticipate short rehab stay and discharge home with services.     (I10) Essential hypertension  Comment: mild hypotension likely due to oxycodone.   Plan: hold Diovan for now and monitor VS. BMP    (D62) Anemia due to blood loss, acute  Comment: mild. No signs of active bleeding.   Plan: follow Hgb    (K59.01) Slow transit constipation  Comment: managed  Plan: continue bowel regimen        Total time spent with patient visit at the skilled nursing facility was 36 minutes including patient visit and review of past records. Greater than 50% of total time spent with counseling and coordinating care due to complexity of care    Electronically signed by:  SUSAN Oseguera CNP

## 2018-02-02 NOTE — PLAN OF CARE
Problem: Patient Care Overview  Goal: Plan of Care/Patient Progress Review  Discharge Planner PT   Patient plan for discharge: TCU  Current status: Sit to/from stand with FWW and SBA, cues for safety. Sit to/from supine with CGA for L LE. On/off toilet with SBA. Amb 150 ft x 1 with FWW and SBA. Tolerates TKA exs well with minimal pain. Pt returned supine at end of session. L knee AAROM 8-84 degrees.  Barriers to return to prior living situation: stairs to enter, A for mob, fall risk, lives alone  Recommendations for discharge: TCU per the plan established by the Physical Therapist   Rationale for recommendations: TCU to maximize functional mob I and safety to allow safe transfer to home alone at PLOF       Entered by: Yesenia Manjarrez 02/02/2018 9:52 AM         Pt discharged to TCU today.    PT goals not met.  Physical Therapy Discharge Summary    Reason for therapy discharge:    Discharged to transitional care facility.    Progress towards therapy goal(s). See goals on Care Plan in [facility] electronic health record for goal details.  Goals not met.  Barriers to achieving goals:   discharge from facility.    Therapy recommendation(s):    Continued therapy is recommended.  Rationale/Recommendations:  TCU to maximize return to PLOF.

## 2018-02-02 NOTE — PROGRESS NOTES
"Gely Tam  2018  POD # [id number]    Admit Date:  2018      Doing well.  Clean wound without signs of infection.  Normal healing wound.  No immediate surgical complications identified.  No excessive bleeding  Pain well-controlled.  Tolerating physical therapy and rehabilitation well.  Calf nontender b le.     Objective:  Blood pressure 98/56, pulse 72, temperature 98.2  F (36.8  C), temperature source Oral, resp. rate 19, height 1.651 m (5' 5\"), weight 62.6 kg (138 lb), SpO2 95 %.    Temperatures:  Current - Temp: 98.2  F (36.8  C); Max - Temp  Av.1  F (36.7  C)  Min: 98  F (36.7  C)  Max: 98.2  F (36.8  C)  Pulse range: Pulse  Av  Min: 72  Max: 82  Blood pressure range: Systolic (24hrs), Av , Min:95 , Max:104   ; Diastolic (24hrs), Av, Min:52, Max:60    Exam:  CMS: intact  alert, stable, wound ok  Calf nontender b le.       Labs:  Recent Labs   Lab Test  18   0645  10/28/12   0515  10/26/12   0545   POTASSIUM  3.4  3.4  3.5     Recent Labs   Lab Test  18   0640  18   0536  18   1036   HGB  11.0*  10.8*  14.1     Recent Labs   Lab Test  10/30/12   0658  10/29/12   0610  10/28/12   0515   INR  1.74*  2.10*  1.22*     Recent Labs   Lab Test  10/30/12   0658  10/29/12   0808  10/26/12   0545   PLT  166  143*  227       PLAN: Weight bearing as tolerated  Pain control measures  Dc today.       "

## 2018-02-02 NOTE — PLAN OF CARE
Problem: Knee Arthroplasty (Total, Partial) (Adult)  Goal: Signs and Symptoms of Listed Potential Problems Will be Absent, Minimized or Managed (Knee Arthroplasty)  Signs and symptoms of listed potential problems will be absent, minimized or managed by discharge/transition of care (reference Knee Arthroplasty (Total, Partial) (Adult) CPG).   Outcome: Improving  Pt is AAOx4, pain relieved with pharm interventions. VSS, dressing CDI, CMS intact, pending dc. Will continue to monitor.

## 2018-02-02 NOTE — PROGRESS NOTES
D: Discharge orders received and faxed to [facility]. Facility has bed availability for today. Patient will be transported via [facility] at 1200. Patient and family are in agreement with this plan.    PAS-RR    D: Per DHS regulation, SW completed and submitted PAS-RR to [place] [employer] Direct Connect via the Senior LinkAge Line.  PAS-RR confirmation # is : [id number]    I: SW spoke with patient and family and they are aware a PAS-RR has been submitted.  SW reviewed with patient and family that they may be contacted for a follow up appointment within 10 days of hospital discharge if their SNF stay is < 30 days.  Contact information for [facility] Line was also provided.    A: patient and family verbalized understanding.    P: Further questions may be directed to [facility] LinkAge Line at #[phone], option #4 for PAS-RR staff.

## 2018-02-04 VITALS
OXYGEN SATURATION: 97 % | WEIGHT: 142.8 LBS | TEMPERATURE: 98.4 F | SYSTOLIC BLOOD PRESSURE: 97 MMHG | RESPIRATION RATE: 18 BRPM | HEIGHT: 65 IN | DIASTOLIC BLOOD PRESSURE: 60 MMHG | BODY MASS INDEX: 23.79 KG/M2 | HEART RATE: 89 BPM

## 2018-02-04 RX ORDER — AMOXICILLIN 250 MG
1 CAPSULE ORAL 2 TIMES DAILY
COMMUNITY
End: 2022-05-04

## 2018-02-05 ENCOUNTER — NURSING HOME VISIT (OUTPATIENT)
Dept: GERIATRICS | Facility: CLINIC | Age: 83
End: 2018-02-05
Payer: MEDICARE

## 2018-02-05 DIAGNOSIS — M17.12 PRIMARY OSTEOARTHRITIS OF LEFT KNEE: Primary | ICD-10-CM

## 2018-02-05 DIAGNOSIS — I10 ESSENTIAL HYPERTENSION: ICD-10-CM

## 2018-02-05 DIAGNOSIS — D62 ANEMIA DUE TO BLOOD LOSS, ACUTE: ICD-10-CM

## 2018-02-05 DIAGNOSIS — Z96.652 STATUS POST TOTAL LEFT KNEE REPLACEMENT: ICD-10-CM

## 2018-02-05 DIAGNOSIS — K59.01 SLOW TRANSIT CONSTIPATION: ICD-10-CM

## 2018-02-05 PROBLEM — R53.81 PHYSICAL DECONDITIONING: Status: ACTIVE | Noted: 2018-02-05

## 2018-02-05 PROCEDURE — 99310 SBSQ NF CARE HIGH MDM 45: CPT | Performed by: NURSE PRACTITIONER

## 2018-02-06 NOTE — DISCHARGE SUMMARY
Orthopedic Discharge Summary   Patient: Gely Tam  Admission Date: 1/30/2018  Discharge Date: 2/6/18  Date of Service: 2/6/2018  Attending Provider: No att. providers found  Admission Diagnosis: djd  Total knee replacement status  Discharge Diagnosis: left knee osteoarthritis  Procedures Performed: left total knee replacement  Complications: None apparent   History of Present Illness: see operative report for full HPI  Past Medical History:   Past Medical History:   Diagnosis Date     Arthritis      HTN (hypertension)      Hyperlipemia      Patient Active Problem List   Diagnosis     HTN (hypertension)     DJD (degenerative joint disease) of knee     Lipid disorder     S/P total hip arthroplasty     Hypotension     Total knee replacement status     Physical deconditioning     Slow transit constipation     Anemia due to blood loss, acute     Past Surgical History:   Procedure Laterality Date     ARTHROPLASTY HIP  10/26/2012    Procedure: ARTHROPLASTY HIP;  RIGHT TOTAL HIP ARTHROPLASTY (J&J)^;  Surgeon: Diana Mcclellan MD;  Location: [facility]     ARTHROPLASTY KNEE Left 1/30/2018    Procedure: ARTHROPLASTY KNEE;  LEFT TOTAL KNEE ARTHROPLASTY ;  Surgeon: Diana Mcclellan MD;  Location: [facility] OR     GYN SURGERY  1981     HYSTERECTOMY  1981    left ovary remains     LAPAROSCOPIC SALPINGOTOMY      bilat,. benign      ORTHOPEDIC SURGERY  2010    right tka     SOFT TISSUE SURGERY  1984 vein ligation     Social History     Social History     Marital status:      Spouse name: Dusty     Number of children: 4     Years of education: N/A     Occupational History     Unknown Unknown     Social History Main Topics     Smoking status: Former Smoker     Packs/day: 1.00     Years: 30.00     Quit date: 1/30/1995     Smokeless tobacco: Never Used     Alcohol use 0.0 oz/week     0 Standard drinks or equivalent per week      Comment: 1/day   wine     Drug use: No     Sexual activity: No     Other Topics Concern     Not on file      Social History Narrative    No advanced care directives on file.     - Dusty         Medications on admission:   No prescriptions prior to admission.     Allergies:  No Known Allergies    Hospital Course: Patient was admitted to Orthopedics and brought to the OR on  where she underwent the above named procedure. Postoperatively she did very well with no apparent complications, and she had an uneventful hospital stay. Ancef was given for 24 hours after surgery. She was given aspirin for DVT prophylaxis and her pain was well controlled. She progressed well with physical therapy and discharge to home was recommended. .   Consultations Obtained During Hospitalization:  2. Physical Therapy for gait training, mobilization, range of motion and strengthening exercises  3. Occupational Therapy for activities of daily living.  4. Social Work for disposition planning.  Active Problems:    Total knee replacement status    Discharge Disposition: patient doing well and discharge home   Discharge Diet: resume normal pre operative diet  Discharge Medications:   Discharge Medication List as of 2/2/2018 11:34 AM      START taking these medications    Details   oxyCODONE IR (ROXICODONE) 5 MG tablet Take 1-2 tablets (5-10 mg) by mouth every 4 hours as needed for moderate to severe pain (Take one tab for moderate pain, two tabs for severe pain), Disp-45 tablet, R-0, Local Print      acetaminophen (TYLENOL) 325 MG tablet Take 2 tablets (650 mg) by mouth every 4 hours as needed for other (surgical pain), Disp-60 tablet, R-0, Local Print      aspirin  MG EC tablet Take 1 tablet (325 mg) by mouth 2 times daily, Disp-60 tablet, R-0, Local Print      senna-docusate (SENOKOT-S;PERICOLACE) 8.6-50 MG per tablet Take 1-2 tablets by mouth 2 times daily, Disp-45 tablet, R-0, Local Print         CONTINUE these medications which have NOT CHANGED    Details   AMOXICILLIN PO Take 2,000 mg by mouth daily as needed (1 hour prior to  dental procedures), Historical      ZOLPIDEM TARTRATE PO Take 5 mg by mouth nightly as needed for sleep, Historical      valsartan-hydrochlorothiazide (DIOVAN HCT) 80-12.5 MG per tablet Take 1 tablet by mouth daily, Disp-30 tablet, Historical      Calcium Carbonate-Vit D-Min (CALCIUM 1200 PO) Take 2 tablets by mouth daily.  , Historical      atorvastatin (LIPITOR) 10 MG tablet Take 10 mg by mouth daily., Historical         STOP taking these medications       aspirin 81 MG chewable tablet Comments:   Reason for Stopping:             Code Status: see records  X-rays needed at the follow up visit: AP and lateral of the left knee  Kenyon Jimenez PA-C  2/6/2018 8:07 AM   [facility]  [phone]

## 2018-02-07 ENCOUNTER — TRANSFERRED RECORDS (OUTPATIENT)
Dept: HEALTH INFORMATION MANAGEMENT | Facility: CLINIC | Age: 83
End: 2018-02-07

## 2018-02-07 ENCOUNTER — DISCHARGE SUMMARY NURSING HOME (OUTPATIENT)
Dept: GERIATRICS | Facility: CLINIC | Age: 83
End: 2018-02-07
Payer: MEDICARE

## 2018-02-07 VITALS
HEIGHT: 65 IN | WEIGHT: 140.6 LBS | DIASTOLIC BLOOD PRESSURE: 46 MMHG | TEMPERATURE: 96.4 F | SYSTOLIC BLOOD PRESSURE: 97 MMHG | BODY MASS INDEX: 23.43 KG/M2 | HEART RATE: 74 BPM | OXYGEN SATURATION: 99 % | RESPIRATION RATE: 24 BRPM

## 2018-02-07 DIAGNOSIS — Z96.652 STATUS POST TOTAL LEFT KNEE REPLACEMENT: ICD-10-CM

## 2018-02-07 DIAGNOSIS — D62 ANEMIA DUE TO BLOOD LOSS, ACUTE: ICD-10-CM

## 2018-02-07 DIAGNOSIS — M17.12 PRIMARY OSTEOARTHRITIS OF LEFT KNEE: Primary | ICD-10-CM

## 2018-02-07 DIAGNOSIS — K59.01 SLOW TRANSIT CONSTIPATION: ICD-10-CM

## 2018-02-07 DIAGNOSIS — R53.81 PHYSICAL DECONDITIONING: ICD-10-CM

## 2018-02-07 DIAGNOSIS — I10 ESSENTIAL HYPERTENSION: ICD-10-CM

## 2018-02-07 LAB
ANION GAP SERPL CALCULATED.3IONS-SCNC: 6 MMOL/L (ref 3–14)
BUN SERPL-MCNC: 13 MG/DL (ref 7–30)
CALCIUM SERPL-MCNC: 9.1 MG/DL (ref 8.5–10.1)
CHLORIDE SERPLBLD-SCNC: 101 MMOL/L (ref 94–109)
CO2 SERPL-SCNC: 30 MMOL/L (ref 20–32)
CREAT SERPL-MCNC: 0.54 MG/DL (ref 0.52–1.04)
GFR SERPL CREATININE-BSD FRML MDRD: >90 ML/MIN/1.73M2
GLUCOSE SERPL-MCNC: 117 MG/DL (ref 70–99)
HEMOGLOBIN: 11.5 G/DL (ref 11.7–15.7)
POTASSIUM SERPL-SCNC: 4.3 MMOL/L (ref 3.4–5.3)
SODIUM SERPL-SCNC: 137 MMOL/L (ref 133–144)

## 2018-02-07 PROCEDURE — 99316 NF DSCHRG MGMT 30 MIN+: CPT | Performed by: NURSE PRACTITIONER

## 2018-02-07 NOTE — PROGRESS NOTES
[place] [facility] DISCHARGE SUMMARY    PATIENT'S NAME: Gely Tam  YOB: 1934  MEDICAL RECORD NUMBER:  [id number]    PRIMARY CARE PROVIDER AND CLINIC RESPONSIBLE AFTER TRANSFER: Vel Calvert [address]    CODE STATUS/ADVANCE DIRECTIVES DISCUSSION:   DNR / DNI     No Known Allergies    TRANSFERRING PROVIDERS: SUSAN Oseguera CNP, Uriel Parikh MD  DATE OF SNF ADMISSION:  January / 30 / 2018  DATE OF SNF (anticipated) DISCHARGE: February / 10 / 2018  DISCHARGE DISPOSITION: Non-[facility] Provider   Nursing Facility: [facility] stay 01/30/2018 to 02/02/2018.     Condition on Discharge:  Improving.  Cognitive Scores: SLUMS 25/30 and CPT 4.8/5.6    Equipment: walker    DISCHARGE DIAGNOSIS:   1. Primary osteoarthritis of left knee    2. Status post total left knee replacement    3. Essential hypertension    4. Anemia due to blood loss, acute    5. Slow transit constipation    6. Physical deconditioning        HPI Nursing Facility Course:  HPI information obtained from: facility chart records, facility staff, patient report and [facility] chart review. She came to this facility for short term rehab and medical management status post left TKA for osteoarthritis 1/30/2018 by Dr Mcclellan. Tolerated the procedure well. ASA bid started for DVT prophylaxis. Hgb 11.0 at hospital discharge.  She has worked with PHYSICAL THERAPY and OT with good results. Ambulating 800 ft with walker and supervision. Independent with toileting and bathing.   ASSESSMENT / PLAN:  (M17.12) Primary osteoarthritis of left knee  (primary encounter diagnosis)  (Z96.652) Status post total left knee replacement  Comment: pain and mobility improved. Incision healing without signs of infection.   Plan: discharge home. Continue tylenol, oxycodone. Continue ASA for DVT prophylaxis. Home services and follow up  as below.     (I10) Essential hypertension  Comment: she's had mild hypotension since tcu admission. Asymptomatic. Drinking plenty of fluids. Some improvement in BP with holding Diovan. Recent readings: 115/58, 97/60, 90/51   HR: 74-89  Plan: continue to hold Diovan until follow up with PCP. Home nurse for BP monitoring and med management.     (D62) Anemia due to blood loss, acute  Comment: Hgb improved  Plan: follow up labs per PCP    (K59.01) Slow transit constipation  Comment: managed  Plan: continue bowel regimen    (R53.81) Physical deconditioning  Comment: progress in therapies as above  Plan: home therapies for ongoing gait training, endurance, ROM left knee.       PAST MEDICAL HISTORY:  has a past medical history of Arthritis; HTN (hypertension); and Hyperlipemia.    DISCHARGE MEDICATIONS:  Current Outpatient Prescriptions   Medication Sig Dispense Refill     senna-docusate (SENOKOT-S;PERICOLACE) 8.6-50 MG per tablet Take 1 tablet by mouth 2 times daily       oxyCODONE IR (ROXICODONE) 5 MG tablet Take 1-2 tablets (5-10 mg) by mouth every 4 hours as needed for moderate to severe pain (Take one tab for moderate pain, two tabs for severe pain) 45 tablet 0     acetaminophen (TYLENOL) 325 MG tablet Take 2 tablets (650 mg) by mouth every 4 hours as needed for other (surgical pain) 60 tablet 0     aspirin  MG EC tablet Take 1 tablet (325 mg) by mouth 2 times daily 60 tablet 0     AMOXICILLIN PO Take 2,000 mg by mouth daily as needed (1 hour prior to dental procedures)       ZOLPIDEM TARTRATE PO Take 5 mg by mouth nightly as needed for sleep       Calcium Carbonate-Vit D-Min (CALCIUM 1200 PO) Take 1 tablet by mouth daily        atorvastatin (LIPITOR) 10 MG tablet Take 10 mg by mouth daily.       valsartan-hydrochlorothiazide (DIOVAN HCT) 80-12.5 MG per tablet Take 1 tablet by mouth daily HOLD starting 2/5/2018 due to hypotension 30 tablet        MEDICATION CHANGES/RATIONALE:   Diovan on hold due to  "hypotension  Controlled medications sent with patient: Script for oxycodone medication for 30 tabs and 0 refills given to patient at dischage to have them fill at their out patient pharmacy and Medication: oxycodone , 30 tabs given to patient at the time of discharge to take home     ROS:    10 point ROS of systems including Constitutional, Eyes, Respiratory, Cardiovascular, Gastroenterology, Genitourinary, Integumentary, Muscularskeletal, Psychiatric were all negative except for pertinent positives noted in my HPI.    Physical Exam:   Vitals: BP 97/46  Pulse 74  Temp 96.4  F (35.8  C)  Resp 24  Ht 5' 5\" (1.651 m)  Wt 140 lb 9.6 oz (63.8 kg)  SpO2 99%  BMI 23.4 kg/m2  BMI= Body mass index is 23.4 kg/(m^2).    GENERAL APPEARANCE:  Alert, in no distress, appears healthy  ENT:  Mouth and posterior oropharynx normal, moist mucous membranes, [place]  EYES:  EOM normal, conjunctiva and lids normal  NECK:  No adenopathy,masses or thyromegaly  RESP:  respiratory effort and palpation of chest normal, lungs clear to auscultation , no respiratory distress  CV:  Palpation and auscultation of heart done , regular rate and rhythm, no murmur, +2 pedal pulses, peripheral edema trace  in both LE  ABDOMEN: soft, nontender, no hepatosplenomegaly or other masses  M/S:   gait steady with walker. Decreased ROM and mild edema left knee, no warmth. Good strength of all extremities.   SKIN:  dressing to left knee clean,dry, intact. No visible erythema. No rashes or open areas  PSYCH:  oriented X 3, normal insight, judgement and memory    DISCHARGE PLAN:  Physical Therapy, Registered Nurse and From:  [facility]  Patient instructed to follow-up with:  PCP in 7 days    Ortho follow up 2/14/2018  Current [place] scheduled appointments:  No future appointments.    MTM referral needed and placed by this provider: No    Pending labs: None  SNF labs   Last Basic Metabolic Panel:  Lab Results   Component Value Date     02/07/2018 "      Lab Results   Component Value Date    POTASSIUM 4. 02/07/2018     Lab Results   Component Value Date    CHLORIDE 101 02/07/2018     Lab Results   Component Value Date    ED 9.1 02/07/2018     Lab Results   Component Value Date    CO2 30 02/07/2018     Lab Results   Component Value Date    BUN 13 02/07/2018     Lab Results   Component Value Date    CR 0.54 02/07/2018     Lab Results   Component Value Date     02/07/2018     Lab Results   Component Value Date    HGB 11.5 02/07/2018       Discharge Treatments: Keep incision clean and dry    TOTAL DISCHARGE TIME:   Greater than 30 minutes  Electronically signed by:  SUSAN Oseguera CNP

## 2022-05-04 ENCOUNTER — APPOINTMENT (OUTPATIENT)
Dept: MRI IMAGING | Facility: CLINIC | Age: 87
DRG: 309 | End: 2022-05-04
Attending: INTERNAL MEDICINE
Payer: MEDICARE

## 2022-05-04 ENCOUNTER — APPOINTMENT (OUTPATIENT)
Dept: GENERAL RADIOLOGY | Facility: CLINIC | Age: 87
DRG: 309 | End: 2022-05-04
Attending: EMERGENCY MEDICINE
Payer: MEDICARE

## 2022-05-04 ENCOUNTER — APPOINTMENT (OUTPATIENT)
Dept: CT IMAGING | Facility: CLINIC | Age: 87
DRG: 309 | End: 2022-05-04
Attending: INTERNAL MEDICINE
Payer: MEDICARE

## 2022-05-04 ENCOUNTER — APPOINTMENT (OUTPATIENT)
Dept: CT IMAGING | Facility: CLINIC | Age: 87
DRG: 309 | End: 2022-05-04
Attending: EMERGENCY MEDICINE
Payer: MEDICARE

## 2022-05-04 ENCOUNTER — HOSPITAL ENCOUNTER (INPATIENT)
Facility: CLINIC | Age: 87
LOS: 3 days | Discharge: HOME | DRG: 309 | End: 2022-05-07
Attending: EMERGENCY MEDICINE | Admitting: INTERNAL MEDICINE
Payer: MEDICARE

## 2022-05-04 DIAGNOSIS — R91.8 ABNORMAL CT LUNG SCREENING: ICD-10-CM

## 2022-05-04 DIAGNOSIS — I48.91 ATRIAL FIBRILLATION WITH RVR (H): ICD-10-CM

## 2022-05-04 LAB
ALBUMIN SERPL-MCNC: 3.2 G/DL (ref 3.4–5)
ALP SERPL-CCNC: 119 U/L (ref 40–150)
ALT SERPL W P-5'-P-CCNC: 15 U/L (ref 0–50)
ANION GAP SERPL CALCULATED.3IONS-SCNC: 6 MMOL/L (ref 3–14)
AST SERPL W P-5'-P-CCNC: 11 U/L (ref 0–45)
BASOPHILS # BLD AUTO: 0 10E3/UL (ref 0–0.2)
BASOPHILS NFR BLD AUTO: 0 %
BILIRUB SERPL-MCNC: 0.7 MG/DL (ref 0.2–1.3)
BUN SERPL-MCNC: 12 MG/DL (ref 7–30)
CALCIUM SERPL-MCNC: 9.1 MG/DL (ref 8.5–10.1)
CHLORIDE BLD-SCNC: 105 MMOL/L (ref 94–109)
CO2 SERPL-SCNC: 27 MMOL/L (ref 20–32)
CREAT SERPL-MCNC: 0.61 MG/DL (ref 0.52–1.04)
CREAT SERPL-MCNC: 0.61 MG/DL (ref 0.52–1.04)
EOSINOPHIL # BLD AUTO: 0 10E3/UL (ref 0–0.7)
EOSINOPHIL NFR BLD AUTO: 0 %
ERYTHROCYTE [DISTWIDTH] IN BLOOD BY AUTOMATED COUNT: 14.9 % (ref 10–15)
ERYTHROCYTE [DISTWIDTH] IN BLOOD BY AUTOMATED COUNT: 15.1 % (ref 10–15)
GFR SERPL CREATININE-BSD FRML MDRD: 86 ML/MIN/1.73M2
GFR SERPL CREATININE-BSD FRML MDRD: 86 ML/MIN/1.73M2
GLUCOSE BLD-MCNC: 124 MG/DL (ref 70–99)
HCT VFR BLD AUTO: 39.1 % (ref 35–47)
HCT VFR BLD AUTO: 42.5 % (ref 35–47)
HGB BLD-MCNC: 12.6 G/DL (ref 11.7–15.7)
HGB BLD-MCNC: 13.6 G/DL (ref 11.7–15.7)
IMM GRANULOCYTES # BLD: 0 10E3/UL
IMM GRANULOCYTES NFR BLD: 0 %
LYMPHOCYTES # BLD AUTO: 1.6 10E3/UL (ref 0.8–5.3)
LYMPHOCYTES NFR BLD AUTO: 21 %
MAGNESIUM SERPL-MCNC: 1.9 MG/DL (ref 1.6–2.3)
MCH RBC QN AUTO: 28.8 PG (ref 26.5–33)
MCH RBC QN AUTO: 29.1 PG (ref 26.5–33)
MCHC RBC AUTO-ENTMCNC: 32 G/DL (ref 31.5–36.5)
MCHC RBC AUTO-ENTMCNC: 32.2 G/DL (ref 31.5–36.5)
MCV RBC AUTO: 90 FL (ref 78–100)
MCV RBC AUTO: 90 FL (ref 78–100)
MONOCYTES # BLD AUTO: 0.6 10E3/UL (ref 0–1.3)
MONOCYTES NFR BLD AUTO: 8 %
NEUTROPHILS # BLD AUTO: 5.5 10E3/UL (ref 1.6–8.3)
NEUTROPHILS NFR BLD AUTO: 71 %
NRBC # BLD AUTO: 0 10E3/UL
NRBC BLD AUTO-RTO: 0 /100
NT-PROBNP SERPL-MCNC: 748 PG/ML (ref 0–1800)
PLATELET # BLD AUTO: 335 10E3/UL (ref 150–450)
PLATELET # BLD AUTO: 337 10E3/UL (ref 150–450)
POTASSIUM BLD-SCNC: 3.5 MMOL/L (ref 3.4–5.3)
POTASSIUM BLD-SCNC: 3.6 MMOL/L (ref 3.4–5.3)
PROT SERPL-MCNC: 6.7 G/DL (ref 6.8–8.8)
RBC # BLD AUTO: 4.33 10E6/UL (ref 3.8–5.2)
RBC # BLD AUTO: 4.73 10E6/UL (ref 3.8–5.2)
SARS-COV-2 RNA RESP QL NAA+PROBE: NEGATIVE
SODIUM SERPL-SCNC: 138 MMOL/L (ref 133–144)
TROPONIN I SERPL HS-MCNC: 12 NG/L
WBC # BLD AUTO: 7.8 10E3/UL (ref 4–11)
WBC # BLD AUTO: 8.3 10E3/UL (ref 4–11)

## 2022-05-04 PROCEDURE — 255N000002 HC RX 255 OP 636: Performed by: EMERGENCY MEDICINE

## 2022-05-04 PROCEDURE — 99285 EMERGENCY DEPT VISIT HI MDM: CPT | Mod: 25

## 2022-05-04 PROCEDURE — 36415 COLL VENOUS BLD VENIPUNCTURE: CPT | Performed by: INTERNAL MEDICINE

## 2022-05-04 PROCEDURE — 71046 X-RAY EXAM CHEST 2 VIEWS: CPT

## 2022-05-04 PROCEDURE — 96366 THER/PROPH/DIAG IV INF ADDON: CPT

## 2022-05-04 PROCEDURE — 250N000009 HC RX 250: Performed by: EMERGENCY MEDICINE

## 2022-05-04 PROCEDURE — 82565 ASSAY OF CREATININE: CPT | Performed by: INTERNAL MEDICINE

## 2022-05-04 PROCEDURE — 83735 ASSAY OF MAGNESIUM: CPT | Performed by: INTERNAL MEDICINE

## 2022-05-04 PROCEDURE — G1004 CDSM NDSC: HCPCS

## 2022-05-04 PROCEDURE — 250N000011 HC RX IP 250 OP 636: Performed by: EMERGENCY MEDICINE

## 2022-05-04 PROCEDURE — 96376 TX/PRO/DX INJ SAME DRUG ADON: CPT

## 2022-05-04 PROCEDURE — 96365 THER/PROPH/DIAG IV INF INIT: CPT

## 2022-05-04 PROCEDURE — 120N000013 HC R&B IMCU

## 2022-05-04 PROCEDURE — 99223 1ST HOSP IP/OBS HIGH 75: CPT | Mod: AI | Performed by: INTERNAL MEDICINE

## 2022-05-04 PROCEDURE — 258N000003 HC RX IP 258 OP 636: Performed by: EMERGENCY MEDICINE

## 2022-05-04 PROCEDURE — 74177 CT ABD & PELVIS W/CONTRAST: CPT | Mod: ME

## 2022-05-04 PROCEDURE — 36415 COLL VENOUS BLD VENIPUNCTURE: CPT | Performed by: EMERGENCY MEDICINE

## 2022-05-04 PROCEDURE — 250N000013 HC RX MED GY IP 250 OP 250 PS 637: Performed by: INTERNAL MEDICINE

## 2022-05-04 PROCEDURE — 99222 1ST HOSP IP/OBS MODERATE 55: CPT | Mod: FS | Performed by: INTERNAL MEDICINE

## 2022-05-04 PROCEDURE — 71275 CT ANGIOGRAPHY CHEST: CPT | Mod: MG

## 2022-05-04 PROCEDURE — 83880 ASSAY OF NATRIURETIC PEPTIDE: CPT | Performed by: EMERGENCY MEDICINE

## 2022-05-04 PROCEDURE — 85027 COMPLETE CBC AUTOMATED: CPT | Performed by: NURSE PRACTITIONER

## 2022-05-04 PROCEDURE — U0003 INFECTIOUS AGENT DETECTION BY NUCLEIC ACID (DNA OR RNA); SEVERE ACUTE RESPIRATORY SYNDROME CORONAVIRUS 2 (SARS-COV-2) (CORONAVIRUS DISEASE [COVID-19]), AMPLIFIED PROBE TECHNIQUE, MAKING USE OF HIGH THROUGHPUT TECHNOLOGIES AS DESCRIBED BY CMS-2020-01-R: HCPCS | Performed by: EMERGENCY MEDICINE

## 2022-05-04 PROCEDURE — 80053 COMPREHEN METABOLIC PANEL: CPT | Performed by: EMERGENCY MEDICINE

## 2022-05-04 PROCEDURE — 85018 HEMOGLOBIN: CPT | Performed by: EMERGENCY MEDICINE

## 2022-05-04 PROCEDURE — 72158 MRI LUMBAR SPINE W/O & W/DYE: CPT | Mod: ME

## 2022-05-04 PROCEDURE — 84484 ASSAY OF TROPONIN QUANT: CPT | Performed by: EMERGENCY MEDICINE

## 2022-05-04 PROCEDURE — A9585 GADOBUTROL INJECTION: HCPCS | Performed by: EMERGENCY MEDICINE

## 2022-05-04 PROCEDURE — C9803 HOPD COVID-19 SPEC COLLECT: HCPCS

## 2022-05-04 PROCEDURE — 250N000011 HC RX IP 250 OP 636: Performed by: NURSE PRACTITIONER

## 2022-05-04 PROCEDURE — 96368 THER/DIAG CONCURRENT INF: CPT

## 2022-05-04 PROCEDURE — 84132 ASSAY OF SERUM POTASSIUM: CPT | Performed by: INTERNAL MEDICINE

## 2022-05-04 RX ORDER — DILTIAZEM HYDROCHLORIDE 5 MG/ML
5 INJECTION INTRAVENOUS ONCE
Status: COMPLETED | OUTPATIENT
Start: 2022-05-04 | End: 2022-05-04

## 2022-05-04 RX ORDER — ASPIRIN 81 MG/1
81 TABLET ORAL DAILY
COMMUNITY
End: 2022-05-17

## 2022-05-04 RX ORDER — POTASSIUM CHLORIDE 750 MG/1
10 TABLET, EXTENDED RELEASE ORAL DAILY
COMMUNITY

## 2022-05-04 RX ORDER — GADOBUTROL 604.72 MG/ML
6 INJECTION INTRAVENOUS ONCE
Status: COMPLETED | OUTPATIENT
Start: 2022-05-04 | End: 2022-05-04

## 2022-05-04 RX ORDER — IOPAMIDOL 755 MG/ML
68 INJECTION, SOLUTION INTRAVASCULAR ONCE
Status: COMPLETED | OUTPATIENT
Start: 2022-05-04 | End: 2022-05-04

## 2022-05-04 RX ORDER — IOPAMIDOL 755 MG/ML
57 INJECTION, SOLUTION INTRAVASCULAR ONCE
Status: COMPLETED | OUTPATIENT
Start: 2022-05-04 | End: 2022-05-04

## 2022-05-04 RX ORDER — ACETAMINOPHEN 650 MG/1
650 SUPPOSITORY RECTAL EVERY 6 HOURS PRN
Status: DISCONTINUED | OUTPATIENT
Start: 2022-05-04 | End: 2022-05-07 | Stop reason: HOSPADM

## 2022-05-04 RX ORDER — HEPARIN SODIUM 10000 [USP'U]/100ML
0-5000 INJECTION, SOLUTION INTRAVENOUS CONTINUOUS
Status: DISCONTINUED | OUTPATIENT
Start: 2022-05-04 | End: 2022-05-05

## 2022-05-04 RX ORDER — ACETAMINOPHEN 325 MG/1
650 TABLET ORAL EVERY 6 HOURS PRN
Status: DISCONTINUED | OUTPATIENT
Start: 2022-05-04 | End: 2022-05-07 | Stop reason: HOSPADM

## 2022-05-04 RX ORDER — FUROSEMIDE 20 MG
20 TABLET ORAL DAILY
COMMUNITY

## 2022-05-04 RX ORDER — LORAZEPAM 0.5 MG/1
0.5 TABLET ORAL
Status: COMPLETED | OUTPATIENT
Start: 2022-05-04 | End: 2022-05-04

## 2022-05-04 RX ORDER — POTASSIUM CHLORIDE 20MEQ/15ML
40 LIQUID (ML) ORAL ONCE
Status: COMPLETED | OUTPATIENT
Start: 2022-05-04 | End: 2022-05-04

## 2022-05-04 RX ORDER — ENOXAPARIN SODIUM 100 MG/ML
40 INJECTION SUBCUTANEOUS EVERY 24 HOURS
Status: DISCONTINUED | OUTPATIENT
Start: 2022-05-04 | End: 2022-05-04 | Stop reason: ALTCHOICE

## 2022-05-04 RX ADMIN — HEPARIN SODIUM 750 UNITS/HR: 10000 INJECTION, SOLUTION INTRAVENOUS at 19:03

## 2022-05-04 RX ADMIN — DILTIAZEM HYDROCHLORIDE 5 MG: 5 INJECTION, SOLUTION INTRAVENOUS at 11:26

## 2022-05-04 RX ADMIN — Medication 1 MG: at 23:25

## 2022-05-04 RX ADMIN — IOPAMIDOL 57 ML: 755 INJECTION, SOLUTION INTRAVENOUS at 12:08

## 2022-05-04 RX ADMIN — SODIUM CHLORIDE 84 ML: 900 INJECTION INTRAVENOUS at 12:08

## 2022-05-04 RX ADMIN — POTASSIUM CHLORIDE 40 MEQ: 20 SOLUTION ORAL at 16:27

## 2022-05-04 RX ADMIN — SODIUM CHLORIDE 1000 ML: 9 INJECTION, SOLUTION INTRAVENOUS at 14:56

## 2022-05-04 RX ADMIN — SODIUM CHLORIDE 500 ML: 9 INJECTION, SOLUTION INTRAVENOUS at 11:37

## 2022-05-04 RX ADMIN — LORAZEPAM 0.5 MG: 0.5 TABLET ORAL at 19:19

## 2022-05-04 RX ADMIN — SODIUM CHLORIDE 60 ML: 9 INJECTION, SOLUTION INTRAVENOUS at 18:47

## 2022-05-04 RX ADMIN — IOPAMIDOL 68 ML: 755 INJECTION, SOLUTION INTRAVENOUS at 18:46

## 2022-05-04 RX ADMIN — DILTIAZEM HYDROCHLORIDE 5 MG/HR: 5 INJECTION, SOLUTION INTRAVENOUS at 12:36

## 2022-05-04 RX ADMIN — GADOBUTROL 6 ML: 604.72 INJECTION INTRAVENOUS at 19:58

## 2022-05-04 ASSESSMENT — ACTIVITIES OF DAILY LIVING (ADL)
ADLS_ACUITY_SCORE: 18

## 2022-05-04 ASSESSMENT — ENCOUNTER SYMPTOMS
PALPITATIONS: 1
DIARRHEA: 0
FEVER: 0
UNEXPECTED WEIGHT CHANGE: 1
ABDOMINAL PAIN: 0
VOMITING: 0
COUGH: 1
FATIGUE: 1

## 2022-05-04 NOTE — PHARMACY-ADMISSION MEDICATION HISTORY
Pharmacy Medication History  Admission medication history interview status for the 5/4/2022  admission is complete. See EPIC admission navigator for prior to admission medications     Location of Interview: Patient room  Medication history sources: Patient, Surescripts and Care Everywhere    Significant changes made to the medication list:  Added Furosemide and K-dur    In the past week, patient estimated taking medication this percent of the time: greater than 90%    Additional medication history information:       Medication reconciliation completed by provider prior to medication history? No    Time spent in this activity: 15 min    Prior to Admission medications    Medication Sig Last Dose Taking? Auth Provider   acetaminophen (TYLENOL) 325 MG tablet Take 2 tablets (650 mg) by mouth every 4 hours as needed for other (surgical pain) Past Week at Unknown time Yes Abby Kim PA-C   aspirin 81 MG EC tablet Take 81 mg by mouth daily 5/3/2022 at 1200 Yes Unknown, Entered By History   atorvastatin (LIPITOR) 10 MG tablet Take 10 mg by mouth daily. 5/3/2022 at 1200 Yes Reported, Patient   calcium carbonate 600 mg-vitamin D 400 units (CALTRATE) 600-400 MG-UNIT per tablet Take 1 tablet by mouth 2 times daily 5/3/2022 at Unknown time Yes Unknown, Entered By History   furosemide (LASIX) 20 MG tablet Take 20 mg by mouth daily 5/3/2022 at 1200 Yes Unknown, Entered By History   potassium chloride ER (KLOR-CON M) 10 MEQ CR tablet Take 10 mEq by mouth daily 5/3/2022 at 1200 Yes Unknown, Entered By History   AMOXICILLIN PO Take 2,000 mg by mouth daily as needed (1 hour prior to dental procedures)   Reported, Patient       The information provided in this note is only as accurate as the sources available at the time of update(s)

## 2022-05-04 NOTE — CONSULTS
[facility]    Cardiology Consultation     Date of Admission: 5/4/2022  Service Date: 05/04/22    Summary:   Ms. Gely Tam is a very pleasant 88 year old female with a past medical history of hyperlipidemia, hypertension who was admitted on 5/4/2022 for atrial fibrillation and suspicion for metastatic lung cancer. I was asked to see the patient for new onset atrial fibrillation with rapid ventricular rate.    Assessment & Plan   1. New onset atrial fibrillation with RVR: Possibly secondary to metastatic lung cancer. Rate variable  on tele. Diltiazem drip started in ER, currently on 5mg/hr. Blood pressure borderline with systolic in high 90's low 100's. CHADS-VASC score of 2. Will start heparin drip for anticoagulation.     2. Hypertension: Blood pressure borderline hypotensive, not on any antihypertensives at home    3. Lower extremity edema: , home lasix held considering blood pressure, echocardiogram ordered    4. Left upper lobe lung mass: Unintentional weight loss and muscle wasting in neck, chest and arms x 3 months per patient. CT was done which resulted in 4.5 cm mass in left upper lobe, possibly a primary lung malignancy. Indeterminate liver lesions, bony metastatic disease, and contralateral pulmonary metastases were also seen. A consult with [place] [facility]ology has been placed. A CT abdomen and pelvis is also pending for investigation of areas of metastasis.     Plan:   1. Continue diltiazem drip, monitor heart rate and blood pressures closely, goal HR <110, hydration with IV fluids considering borderline hypotension  2. Plan to transition to oral rate control and taper dilitazem once HR control improved   3. Await echocardiogram results   4. [place] [facility] consult pending, review recommendations following consult and result of CT abdomen/pelvis  5. Start heparin drip for anticoagulation and routine monitoring per pharmacy    I spent 20 minutes face-to-face or coordinating  care of Gely Tam. Over 50% of our time on the unit was spent counseling the patient and/or coordinating care.    Thank you for the opportunity to participate in this pleasant patient's care.     SUSAN Meyer, CNP   Nurse Practitioner  [facility]      Code Status    Prior    Reason for Consult   Reason for consult: I was asked by Dr. Jeong to evaluate this patient for Atrial Fibrillation with RVR.    Primary Care Physician   Vel Calvert    Chief Complaint   Unexplained weight loss, BLE edema, and atrial fibrillation with RVR    History is obtained from the patient    History of Present Illness   Ms. Gely Tam is a very pleasant 88 year old female with a past medical history of hypertension and hyperlipidemia who presented to the emergency department with atrial fibrillation with rapid ventricular rate on 5/4/2022     She had been having increased lower extremity edema in December of 2021. PCP Enrike ordered an EKG and an echocardiogram however the patient was unable to get an appointment until today. While she was at the clinic, she was noted to be in atrial fibrillation with RVR and was sent to the ER.     She has been been feeling fatigued lately, but was able to clean and set up all her patio furniture on her deck yesterday. Denies chest pain, SOB, or dizziness. No recent fevers or illnesses. No recent dietary or medication changes. She does drink 2 glasses of wine nightly.     Past Medical History   I have reviewed this patient's medical history and updated it with pertinent information if needed.   Past Medical History:   Diagnosis Date     Arthritis      HTN (hypertension)      Hyperlipemia        Past Surgical History   I have reviewed this patient's surgical history and updated it with pertinent information if needed.  Past Surgical History:   Procedure Laterality Date     ARTHROPLASTY HIP  10/26/2012    Procedure: ARTHROPLASTY HIP;  RIGHT TOTAL HIP  ARTHROPLASTY (J&J)^;  Surgeon: Diana Mcclellan MD;  Location: [facility] OR     ARTHROPLASTY KNEE Left 1/30/2018    Procedure: ARTHROPLASTY KNEE;  LEFT TOTAL KNEE ARTHROPLASTY ;  Surgeon: Diana Mcclellan MD;  Location: [facility] OR     GYN SURGERY  1981     HYSTERECTOMY  1981    left ovary remains     LAPAROSCOPIC SALPINGOTOMY      bilat,. benign      ORTHOPEDIC SURGERY  2010    right tka     SOFT TISSUE SURGERY  1984 vein ligation       Prior to Admission Medications   Prior to Admission Medications   Prescriptions Last Dose Informant Patient Reported? Taking?   AMOXICILLIN PO  Self Yes No   Sig: Take 2,000 mg by mouth daily as needed (1 hour prior to dental procedures)   acetaminophen (TYLENOL) 325 MG tablet Past Week at Unknown time Self No Yes   Sig: Take 2 tablets (650 mg) by mouth every 4 hours as needed for other (surgical pain)   aspirin 81 MG EC tablet 5/3/2022 at 1200 Self Yes Yes   Sig: Take 81 mg by mouth daily   atorvastatin (LIPITOR) 10 MG tablet 5/3/2022 at 1200 Self Yes Yes   Sig: Take 10 mg by mouth daily.   calcium carbonate 600 mg-vitamin D 400 units (CALTRATE) 600-400 MG-UNIT per tablet 5/3/2022 at Unknown time Self Yes Yes   Sig: Take 1 tablet by mouth 2 times daily   furosemide (LASIX) 20 MG tablet 5/3/2022 at 1200 Self Yes Yes   Sig: Take 20 mg by mouth daily   potassium chloride ER (KLOR-CON M) 10 MEQ CR tablet 5/3/2022 at 1200 Self Yes Yes   Sig: Take 10 mEq by mouth daily      Facility-Administered Medications: None     Allergies   No Known Allergies    Social History   I have reviewed this patient's social history and updated it with pertinent information if needed. Gely Tam  reports that she quit smoking about 27 years ago. She has a 30.00 pack-year smoking history. She has never used smokeless tobacco. She reports current alcohol use. She reports that she does not use drugs.    Family History   I have reviewed this patient's family history and updated it with pertinent information if needed.    Family History   Problem Relation Age of Onset     Breast Cancer Other         aunt       Review of Systems   The 10 point Review of Systems is negative other than noted in the HPI or here.     Physical Exam   Temp: 98.3  F (36.8  C) Temp src: Temporal BP: 97/56 Pulse: (!) 123   Resp: 12 SpO2: 98 % O2 Device: None (Room air)    Vital Signs with Ranges  Temp:  [98.3  F (36.8  C)] 98.3  F (36.8  C)  Pulse:  [107-151] 123  Resp:  [10-24] 12  BP: ()/(52-87) 97/56  SpO2:  [98 %-99 %] 98 %  135 lbs 0 oz    Constitutional: Appears her stated age, well nourished, and in no acute distress.  Eyes: Pupils equal, round. Sclerae anicteric.   HEENT: Normocephalic, atraumatic.   Neck: Supple. Carotid pulses full and equal. No carotid bruit. No anterior cervical or supraclavicular lymphadenopathy appreciated. No JVD appreciated.  Respiratory: Breathing non-labored. Lungs clear to auscultation bilaterally. No crackles, wheezes, rhonchi, or rales.  Cardiovascular: irregularly irregular rate and rhythm, normal S1 and S2. No murmur, rub, or gallop.  GI: Soft, non-distended, non-tender, bowel sounds present in all four quadrants.  Skin: Warm, dry. No rashes, cyanosis, edema, or xanthelasma. Pronounced clubbing of all fingers.   Musculoskeletal/Extremities: Moves all extremities well and symmetrically. 1-2+ BLE pitting edema.  Neurologic: No gross focal deficits. Alert, awake, and oriented to person, place and time.  Psychiatric: Affect appropriate. Mentation normal.    Data     Recent Labs   Lab 05/04/22  1045   WBC 7.8   HGB 13.6   HCT 42.5   MCV 90        Recent Labs   Lab 05/04/22  1045      POTASSIUM 3.5   CHLORIDE 105   CO2 27   ANIONGAP 6   *   BUN 12   CR 0.61   GFRESTIMATED 86   ED 9.1     Recent Labs   Lab 05/04/22  1045   NTBNPI [id number]        This note was completed in part using Dragon voice recognition software. Although reviewed after completion, some word and grammatical errors may occur.

## 2022-05-04 NOTE — ED PROVIDER NOTES
History   Chief Complaint:  Palpitations       The history is provided by the patient.      Gely Tam is a 88 year old female with history of hypertension and hyperlipidemia who presents with palpitations. Over the past three months, the patient has been experiencing bilateral lower leg swelling. Due to this, she had an echocardiogram set up for today. She also notes that over the past month she has lost some weight and has been increasingly fatigued. Today while going to get an echocardiogram, she was having a sporadic heart rate so they sent her to the ED for further evaluation before having the ultrasound done. While in the ED, she states she feels mostly fine expect being fatigued and she has also had a recent dry cough. She denies any recent fever, chest pain, abdominal pain, diarrhea, and vomiting.       Review of Systems   Constitutional: Positive for fatigue and unexpected weight change. Negative for fever.   Respiratory: Positive for cough.    Cardiovascular: Positive for palpitations and leg swelling. Negative for chest pain.   Gastrointestinal: Negative for abdominal pain, diarrhea and vomiting.   All other systems reviewed and are negative.        Allergies:  The patient has no known allergies.     Medications:  Aspirin 325 mg   Lipitor  Senna-docusate   Lasix   Zolpidem tartrate  Diovan     Past Medical History:     Arthritis   Hypertension   Hyperlipidemia  DJD   Acute anemia   Physical deconditioning       Past Surgical History:    Right total hip arthroplasty   Left total knee arthroplasty   Hysterectomy  Bilateral Salpingotomy   Right TKA   Vein ligation      Family History:    The patient has no known family history.    Social History:  The patient presents to the ED with her daughter-in-law. Her PCP is Dr. Vel Calvert.       Physical Exam     Patient Vitals for the past 24 hrs:   BP Temp Temp src Pulse Resp SpO2 Height Weight   05/04/22 1300 97/56 -- -- (!) 123 12 98 % -- --  "  05/04/22 1245 96/67 -- -- (!) 141 15 99 % -- --   05/04/22 1230 104/60 -- -- (!) 125 24 99 % -- --   05/04/22 1215 -- -- -- (!) 140 -- -- -- --   05/04/22 1200 113/53 -- -- 113 15 98 % -- --   05/04/22 1145 104/69 -- -- 120 14 99 % -- --   05/04/22 1130 99/71 -- -- (!) 124 23 98 % -- --   05/04/22 1115 94/65 -- -- (!) 149 10 99 % -- --   05/04/22 1100 103/82 -- -- (!) 151 11 99 % -- --   05/04/22 1049 102/87 -- -- (!) 147 13 99 % -- --   05/04/22 1030 123/59 -- -- (!) 150 18 98 % -- --   05/04/22 1012 -- -- -- -- -- 99 % -- --   05/04/22 1010 134/52 -- -- 107 -- -- -- --   05/04/22 1009 -- 98.3  F (36.8  C) Temporal -- 16 -- 1.651 m (5' 5\") 61.2 kg (135 lb)       Physical Exam  Vitals: reviewed by me  General: Pt seen on [facility], cooperative, and alert to conversation  Eyes: Tracking well, clear conjunctiva BL  ENT: MMM, midline trachea.   Lungs: No tachypnea, no accessory muscle use. No respiratory distress.  Good air movement to both sides  CV: Rate as above, irregularly irregular rhythm.  Normal S1 and S2, no additional heart sounds heard  Abd: Soft, non tender, no guarding, no rebound. Non distended  MSK: no joint effusion.  No evidence of trauma, symmetric 1-2+ pitting edema noted in bilateral lower extremities  Skin: No rash  Neuro: Clear speech and no facial droop.  Moving all extremity spontaneously, following all commands.  Psych: Not RIS, no e/o AH/VH      Emergency Department Course   ECG  ECG obtained at 1014, ECG read at 1022  Undetermined rhythm. Incomplete right bundle branch block. Marked ST abnormality, possible inferior subendocardial injury. Abnormal ECG.    Rate 164 bpm. VT interval * ms. QRS duration 108 ms. QT/QTc 326/538 ms. P-R-T axes * 85 -60.     Imaging:  CT Chest Pulmonary Embolism w Contrast   Final Result   IMPRESSION:   1.  No pulmonary embolism demonstrated.   2.  4.5 cm mass in the left upper lobe, possibly a primary lung   malignancy.   3.  Contralateral " pulmonary metastases.   4.  Indeterminant liver lesions.   5.  Bony metastatic disease.      VANI GONSALEZ MD            SYSTEM ID:  [id number]      XR Chest 2 Views   Final Result   IMPRESSION: Suspected right upper lobe mass versus less likely   masslike consolidation. Nodules in the right upper lobe measuring up   to 1 cm noted. CT scan recommended for further evaluation. No definite   consolidation otherwise in the lungs. The cardiac silhouette is not   enlarged. Pulmonary vasculature is unremarkable. Lower thoracic spine   compression deformity.      VANI GONSALEZ MD            SYSTEM ID:  [id number]      Echocardiogram Complete    (Results Pending)   CT Abdomen Pelvis w/o & w Contrast    (Results Pending)   MR Thoracic Spine w/o & w Contrast    (Results Pending)   MR Lumbar Spine w/o & w Contrast    (Results Pending)     Report per radiology    Laboratory:  Labs Ordered and Resulted from Time of ED Arrival to Time of ED Departure   COMPREHENSIVE METABOLIC PANEL - Abnormal       Result Value    Sodium 138      Potassium 3.5      Chloride 105      Carbon Dioxide (CO2) 27      Anion Gap 6      Urea Nitrogen 12      Creatinine 0.61      Calcium 9.1      Glucose 124 (*)     Alkaline Phosphatase 119      AST 11      ALT 15      Protein Total 6.7 (*)     Albumin 3.2 (*)     Bilirubin Total 0.7      GFR Estimate 86     TROPONIN I - Normal    Troponin I High Sensitivity 12     NT PROBNP INPATIENT - Normal    N terminal Pro BNP Inpatient 748     CBC WITH PLATELETS AND DIFFERENTIAL    WBC Count 7.8      RBC Count 4.73      Hemoglobin 13.6      Hematocrit 42.5      MCV 90      MCH 28.8      MCHC 32.0      RDW 14.9      Platelet Count 337      % Neutrophils 71      % Lymphocytes 21      % Monocytes 8      % Eosinophils 0      % Basophils 0      % Immature Granulocytes 0      NRBCs per 100 WBC 0      Absolute Neutrophils 5.5      Absolute Lymphocytes 1.6      Absolute Monocytes 0.6      Absolute Eosinophils 0.0      Absolute  Basophils 0.0      Absolute Immature Granulocytes 0.0      Absolute NRBCs 0.0     COVID-19 VIRUS (CORONAVIRUS) BY PCR          Emergency Department Course:             Reviewed:  I reviewed nursing notes, vitals and past medical history    Assessments:  1026 I obtained history and examined the patient as noted above.   1130 I rechecked the patient and explained findings.   1325 I spoke with the patient to discuss her treatment plan.     Consults:  1333 I spoke with Dr. Mccoy, hospitalist at the [facility], to discuss the patient's condition and treatment plan. Patient not appropriate for [facility] and there are not beds available at [place].   1339 I spoke with Dr. Jeong, hospitalist, who agreed to accept the patient.     Interventions:  1126 Diltiazem 5 mg IV  1137 NS 1L IV Bolus  1236 Diltiazem 125 mg in NS     Disposition:  The patient was admitted to the hospital under the care of Dr. Jeong. Patient will board in the ED until a bed is available.     Impression & Plan     Medical Decision Making:  This is a very pleasant 88-year-old female who presents the emergency room with shortness of breath, and found to have A. fib RVR on exam here.  She is doing well with a diltiazem drip, though blood pressure continues to be slightly low.  I have ordered fluids for this, and we will monitor her very carefully.  Her BNP is reassuring, and she has no respiratory distress after receiving 500 cc, so we will continue with judicious fluid administration.  She has no evidence of a pulmonary embolism, no evidence of sepsis or pneumonia, though her CT scan of course is concerning for the lung mass that was found.  I told him that the lung mass looks to be cancerous and that there is even evidence of metastasis at this point.  They understand that the neck step will be consultation with oncologist and possibly a biopsy.  She continues to do well here, and will be monitored until the next available bed is ready.   There are no beds here at [facility], but there are no beds in the [place] system I am told by our team here, so she will board but has been admitted by Dr. Jeong.    Critical Care Time: was 30 minutes for this patient excluding procedures    Diagnosis:    ICD-10-CM    1. Atrial fibrillation with RVR (H)  I48.91    2. Abnormal CT lung screening  R91.8          Scribe Disclosure:  I, Abby Modi, am serving as a scribe at 10:18 AM on 5/4/2022 to document services personally performed by Wilberto Roger MD based on my observations and the provider's statements to me.              Wilberto Roger MD  05/04/22 0071

## 2022-05-04 NOTE — H&P
[facility]    History and Physical  Hospitalist       Date of Admission:  5/4/2022    Assessment & Plan   Gely Tam is a 88 year old female with PMH of HTN, HLD, who presents with new atrial fibrillation with RVR, and suspicion for metastatic lung cancer.    New atrial fibrillation with RVR  Patient denies any palpitations, lightheadedness with walking. She reports onset of leg swelling in December 2021 and eventually was scheduled for outpatient echo on 5/4, which was not done due to rapid afib. Etiology of afib likely related to highly suspicious lung lesions concerning for metastatic cancer (see below). BNP is not elevated--not clearly volume overloaded.  - Cardiology consult  - Diltiazem drip started in the ED, goal HR < 110  - Telemetry, I&O, daily weights  - Echocardiogram ordered  - IVF ordered per ED due to soft BPs  - Keep Mg > 2 and K > 4 with replacement protocols (K replacement protocol could not be ordered--retry again tomorrow)    Suspected metastatic lung cancer  Patient reports weight loss since December 2021. Brought to the ED for atrial fibrillation on 5/4 and CXR is notable for a RUL mass. Follow up CT chest shows no PE but a large 4.5cm RUL mass with probable contralateral metastases an bony metastases. On exam patient has significant clubbing of all her fingers. Patient has a 30 pack year history of smoking, quit approx 28 years ago.  - HemOnc consult to assist with diagnosis, family requested [facility]  - Will order CT abdomen + pelvis with contrast    Chronic mid to lower back pain: Workup includes lumbar XR in December 2021 which showed some degenerative changes but no lesions or fractures. Patient denies any radicular symptoms. Given patient's weight loss, probable diagnosis of lung cancer above, and metastatic bone lesions seen on CT, will benefit from MRI for metastatic, spinal cord evaluation.  - MRI thoracic and lumbar spine ordered.  Addendum: prelim readings  "from MRI show T9-T11 metastatic lesions with at least one pathologic fracture. Will make patient bedrest and consult Neurosurgery    Lower extremity edema  HTN, HLD  On Lasix 20mg daily prior to admission. Patient is relatively hypotensive here, BNP is not elevated at 700. Patient has LE edema but otherwise does not have evidence of volume overload.  - Okay for Regular diet  - Being given IVF in the ED, agree with this approach  - Hold PTA Lasix  - Hold PTA ASA  - Hold PTA atorvastatin  - Echocardiogram as above    COVID-19 ASYMPTOMATIC testing: Patient does not have any abnormal respiratory symptoms and is considered LOW SUSPICION for COVID.    DVT Prophylaxis: Lovenox ppx  Code Status: DNR, discussed with family in room    Disposition: Expected discharge ~3 days pending Cards and Onc workup    Alexx Jeong MD, MD    Primary Care Physician   Vel Calvert    Chief Complaint   Weight loss, BLE swelling    History is obtained from the patient  Case discussed with ED provider    History of Present Illness   Gely Tam is a 88 year old female who presents with weight loss and BLE swelling. She reports onset of back pain in December 2021. A lumbar spine at the time did not note any clear lesions. She also developed some ankle swelling in December, as well as 5lb weight loss. Due to this her PCP Dr. Calvert ordered an EKG and echocardiogram, she could not get the echocardiogram done until today. However while at the heart clinic she was in afib with RVR and so was sent to the ED instead.    She reports feeling \"crappy\" for the several months, which she notes is nonspecific but generally she describes as feeling \"tired\" and lack of \"pop.\" She notes good appetite. She endorses ongoing weight loss since December 2021. She also has had ongoing mid back pain and left foot pain. Denies any palpitations, lightheadedness, chest pain, SOB. Denies headache, confusion, vision changes. She has remained " active. She endorses a dry intermittent cough.    Past Medical History    I have reviewed this patient's medical history and updated it with pertinent information if needed.   Past Medical History:   Diagnosis Date     Arthritis      HTN (hypertension)      Hyperlipemia        Past Surgical History   I have reviewed this patient's surgical history and updated it with pertinent information if needed.  Past Surgical History:   Procedure Laterality Date     ARTHROPLASTY HIP  10/26/2012    Procedure: ARTHROPLASTY HIP;  RIGHT TOTAL HIP ARTHROPLASTY (J&J)^;  Surgeon: Diana Mcclellan MD;  Location: [facility] OR     ARTHROPLASTY KNEE Left 1/30/2018    Procedure: ARTHROPLASTY KNEE;  LEFT TOTAL KNEE ARTHROPLASTY ;  Surgeon: Diana Mcclellan MD;  Location: [facility] OR     GYN SURGERY  1981     HYSTERECTOMY  1981    left ovary remains     LAPAROSCOPIC SALPINGOTOMY      bilat,. benign      ORTHOPEDIC SURGERY  2010    right tka     SOFT TISSUE SURGERY  1984 vein ligation       Prior to Admission Medications   Prior to Admission Medications   Prescriptions Last Dose Informant Patient Reported? Taking?   AMOXICILLIN PO  Self Yes No   Sig: Take 2,000 mg by mouth daily as needed (1 hour prior to dental procedures)   acetaminophen (TYLENOL) 325 MG tablet Past Week at Unknown time Self No Yes   Sig: Take 2 tablets (650 mg) by mouth every 4 hours as needed for other (surgical pain)   aspirin 81 MG EC tablet 5/3/2022 at 1200 Self Yes Yes   Sig: Take 81 mg by mouth daily   atorvastatin (LIPITOR) 10 MG tablet 5/3/2022 at 1200 Self Yes Yes   Sig: Take 10 mg by mouth daily.   calcium carbonate 600 mg-vitamin D 400 units (CALTRATE) 600-400 MG-UNIT per tablet 5/3/2022 at Unknown time Self Yes Yes   Sig: Take 1 tablet by mouth 2 times daily   furosemide (LASIX) 20 MG tablet 5/3/2022 at 1200 Self Yes Yes   Sig: Take 20 mg by mouth daily   potassium chloride ER (KLOR-CON M) 10 MEQ CR tablet 5/3/2022 at 1200 Self Yes Yes   Sig: Take 10 mEq by mouth daily       Facility-Administered Medications: None     Allergies   No Known Allergies    Social History   I have reviewed this patient's social history and updated it with pertinent information if needed. Gely Tam  reports that she quit smoking about 27 years ago. She has a 30.00 pack-year smoking history. She has never used smokeless tobacco. She reports current alcohol use. She reports that she does not use drugs.    Family History   I have reviewed this patient's family history and updated it with pertinent information if needed.   Family History   Problem Relation Age of Onset     Breast Cancer Other         aunt       Review of Systems   The 10 point Review of Systems is negative other than noted in the HPI or here.    Physical Exam   Temp: 98.3  F (36.8  C) Temp src: Temporal BP: 97/56 Pulse: (!) 123   Resp: 12 SpO2: 98 % O2 Device: None (Room air)    Vital Signs with Ranges  Temp:  [98.3  F (36.8  C)] 98.3  F (36.8  C)  Pulse:  [107-151] 123  Resp:  [10-24] 12  BP: ()/(52-87) 97/56  SpO2:  [98 %-99 %] 98 %  135 lbs 0 oz    Constitutional: Thin female in NAD  Eyes: PERRL, nonicteric  HEENT: Normocephalic, atraumatic, oral mucosa moist  Respiratory: CTAB, no wheezing or crackles  Cardiovascular: RRR, normal S1/2, no m/r/g  GI: No organomegaly, normoactive bowel sounds, nontender  Vascular: 1+ bilateral lower extremity pitting edema worse on the eft  Skin: No rashes. Notable clubbing of all the fingers  Musculoskeletal: Moves all extremities  Neurologic: A&Ox3  Psychiatric: Appropriate affect and mood    Data   Data reviewed today:  I personally reviewed CT, labwork.  Recent Labs   Lab 05/04/22  1045   WBC 7.8   HGB 13.6   MCV 90         POTASSIUM 3.5   CHLORIDE 105   CO2 27   BUN 12   CR 0.61   ANIONGAP 6   ED 9.1   *   ALBUMIN 3.2*   PROTTOTAL 6.7*   BILITOTAL 0.7   ALKPHOS 119   ALT 15   AST 11       Imaging:  Recent Results (from the past 24 hour(s))   XR Chest 2 Views    Narrative     CHEST TWO VIEWS May 4, 2022 10:39 AM     HISTORY: Palpitations.    COMPARISON: None.       Impression    IMPRESSION: Suspected right upper lobe mass versus less likely  masslike consolidation. Nodules in the right upper lobe measuring up  to 1 cm noted. CT scan recommended for further evaluation. No definite  consolidation otherwise in the lungs. The cardiac silhouette is not  enlarged. Pulmonary vasculature is unremarkable. Lower thoracic spine  compression deformity.    VANI GONSALEZ MD         SYSTEM ID:  [id number]   CT Chest Pulmonary Embolism w Contrast    Narrative    CT CHEST PULMONARY EMBOLISM WITH CONTRAST 5/4/2022 12:40 PM    CLINICAL HISTORY: Chest pain.     TECHNIQUE: CT angiogram chest during arterial phase injection IV  contrast. 2D and 3D MIP reconstructions were performed by the CT  technologist. Dose reduction techniques were used.     CONTRAST: 57mL [id number]    COMPARISON: None.    FINDINGS:  ANGIOGRAM CHEST: Pulmonary arteries are normal caliber and negative  for pulmonary emboli. Thoracic aorta is negative for dissection. No CT  evidence of right heart strain.    LUNGS AND PLEURA: 4.5 cm mass in the left upper lobe. Nodules on the  right measuring up to 1.5 cm. No effusions.    MEDIASTINUM/AXILLAE: Right hilar and subcarinal adenopathy with a  right hilar lymph node measuring 1.9 x 1.5 cm.    UPPER ABDOMEN: Indeterminate liver lesion in the posterior right lobe  measuring 4.1 cm.    MUSCULOSKELETAL: T11 compression deformity appears pathologic. Erosive  bony lesion in L2. Destructive bony lesion in the anterolateral left  fourth rib.      Impression    IMPRESSION:  1.  No pulmonary embolism demonstrated.  2.  4.5 cm mass in the left upper lobe, possibly a primary lung  malignancy.  3.  Contralateral pulmonary metastases.  4.  Indeterminant liver lesions.  5.  Bony metastatic disease.    VANI GONSALEZ MD         SYSTEM ID:  [id number]

## 2022-05-04 NOTE — ED NOTES
Pt was at echo appointment and she was sent here because of tachycardia. She denies SOB and CP. C/o BLE edema.

## 2022-05-04 NOTE — ED TRIAGE NOTES
Pt presents with intermittent tachycardia. Pt unsure how long this has been going on. Pt reports 3 months of LE edema. HR  in triage.     Triage Assessment     Row Name 05/04/22 1010       Triage Assessment (Adult)    Airway WDL WDL       Respiratory WDL    Respiratory WDL --  Baseline-Denies SOB       Skin Circulation/Temperature WDL    Skin Circulation/Temperature WDL WDL       Cardiac WDL    Cardiac WDL rhythm    Cardiac Rhythm apical pulse irregular;tachycardic       Peripheral/Neurovascular WDL    Peripheral Neurovascular WDL WDL       Cognitive/Neuro/Behavioral WDL    Cognitive/Neuro/Behavioral WDL WDL

## 2022-05-04 NOTE — ED NOTES
[facility]  ED Nurse Handoff Report    ED Chief complaint: Palpitations      ED Diagnosis:   Final diagnoses:   None       Code Status: To be determined by admitting    Allergies: No Known Allergies    Patient Story: Pt presents with intermittent tachycardia. Pt unsure how long this has been going on. Pt reports 3 months of LE edema. HR  in triage. Pt was at echo appointment and she was sent here because of tachycardia. She denies SOB and CP. C/o BLE edema    Focused Assessment:  Tachycardia, Denies SOB/CP. Afib RVR.      Treatments and/or interventions provided: IV bolus, Dilt bolus/ gtt     XR Chest 2 Views   Preliminary Result   IMPRESSION: Suspected right upper lobe mass versus less likely   masslike consolidation. Nodules in the right upper lobe measuring up   to 1 cm noted. CT scan recommended for further evaluation. No definite   consolidation otherwise in the lungs. The cardiac silhouette is not   enlarged. Pulmonary vasculature is unremarkable. Lower thoracic spine   compression deformity.      CT Chest Pulmonary Embolism w Contrast    (Results Pending)     Abnormal Labs Resulted from Time of ED Arrival to Time of ED Departure   COMPREHENSIVE METABOLIC PANEL - Abnormal       Result Value    Sodium 138      Potassium 3.5      Chloride 105      Carbon Dioxide (CO2) 27      Anion Gap 6      Urea Nitrogen 12      Creatinine 0.61      Calcium 9.1      Glucose 124 (*)     Alkaline Phosphatase 119      AST 11      ALT 15      Protein Total 6.7 (*)     Albumin 3.2 (*)     Bilirubin Total 0.7      GFR Estimate 86       Patient's response to treatments and/or interventions:     To be done/followed up on inpatient unit:  Cardiology, echo    Does this patient have any cognitive concerns?: none    Activity level - Baseline/Home:  Independent  Activity Level - Current:   Stand with Assist    Patient's Preferred language: English   Needed?: No    Isolation: None  Infection: Not  Applicable  Patient tested for COVID 19 prior to admission: YES  Bariatric?: No    Vital Signs:   Vitals:    05/04/22 1030 05/04/22 1049 05/04/22 1100 05/04/22 1115   BP: 123/59 102/87 103/82 94/65   Pulse: (!) 150 (!) 147 (!) 151 (!) 149   Resp: 18 13 11 10   Temp:       TempSrc:       SpO2: 98% 99% 99% 99%   Weight:       Height:           Cardiac Rhythm:     Was the PSS-3 completed:   Yes  What interventions are required if any?               Family Comments: son at bedside  OBS brochure/video discussed/provided to patient/family: No              Name of person given brochure if not patient: no              Relationship to patient: no    For the majority of the shift this patient's behavior was Green.   Behavioral interventions performed were none.    ED NURSE PHONE NUMBER: [phone]

## 2022-05-04 NOTE — ED NOTES
Patient resting on cart. Denies any dizziness, chest pain, dyspnea, nausea. Patient remains in Afib, HR 90s-110s. BP stable.

## 2022-05-04 NOTE — ED NOTES
MRI screening form done. Patient thinks she will feel claustrophobic while in MRI, MD paged and asked for medications. MRI will most likely not take place until later tonight.

## 2022-05-05 ENCOUNTER — APPOINTMENT (OUTPATIENT)
Dept: CARDIOLOGY | Facility: CLINIC | Age: 87
DRG: 309 | End: 2022-05-05
Attending: INTERNAL MEDICINE
Payer: MEDICARE

## 2022-05-05 LAB
ALBUMIN SERPL-MCNC: 2.5 G/DL (ref 3.4–5)
ALP SERPL-CCNC: 92 U/L (ref 40–150)
ALT SERPL W P-5'-P-CCNC: 12 U/L (ref 0–50)
ANION GAP SERPL CALCULATED.3IONS-SCNC: 4 MMOL/L (ref 3–14)
AST SERPL W P-5'-P-CCNC: 11 U/L (ref 0–45)
BILIRUB SERPL-MCNC: 0.9 MG/DL (ref 0.2–1.3)
BUN SERPL-MCNC: 9 MG/DL (ref 7–30)
CALCIUM SERPL-MCNC: 8.5 MG/DL (ref 8.5–10.1)
CHLORIDE BLD-SCNC: 108 MMOL/L (ref 94–109)
CO2 SERPL-SCNC: 26 MMOL/L (ref 20–32)
CREAT SERPL-MCNC: 0.55 MG/DL (ref 0.52–1.04)
ERYTHROCYTE [DISTWIDTH] IN BLOOD BY AUTOMATED COUNT: 15.5 % (ref 10–15)
GFR SERPL CREATININE-BSD FRML MDRD: 88 ML/MIN/1.73M2
GLUCOSE BLD-MCNC: 114 MG/DL (ref 70–99)
GLUCOSE BLDC GLUCOMTR-MCNC: 106 MG/DL (ref 70–99)
HCT VFR BLD AUTO: 35.7 % (ref 35–47)
HGB BLD-MCNC: 11.7 G/DL (ref 11.7–15.7)
LVEF ECHO: NORMAL
MCH RBC QN AUTO: 29.9 PG (ref 26.5–33)
MCHC RBC AUTO-ENTMCNC: 32.8 G/DL (ref 31.5–36.5)
MCV RBC AUTO: 91 FL (ref 78–100)
PLATELET # BLD AUTO: 299 10E3/UL (ref 150–450)
POTASSIUM BLD-SCNC: 3.7 MMOL/L (ref 3.4–5.3)
PROT SERPL-MCNC: 5.4 G/DL (ref 6.8–8.8)
RBC # BLD AUTO: 3.91 10E6/UL (ref 3.8–5.2)
SODIUM SERPL-SCNC: 138 MMOL/L (ref 133–144)
WBC # BLD AUTO: 6.9 10E3/UL (ref 4–11)

## 2022-05-05 PROCEDURE — 120N000013 HC R&B IMCU

## 2022-05-05 PROCEDURE — 999N000147 HC STATISTIC PT IP EVAL DEFER

## 2022-05-05 PROCEDURE — 250N000011 HC RX IP 250 OP 636: Performed by: INTERNAL MEDICINE

## 2022-05-05 PROCEDURE — 999N000111 HC STATISTIC OT IP EVAL DEFER: Performed by: OCCUPATIONAL THERAPIST

## 2022-05-05 PROCEDURE — 250N000013 HC RX MED GY IP 250 OP 250 PS 637: Performed by: INTERNAL MEDICINE

## 2022-05-05 PROCEDURE — 93306 TTE W/DOPPLER COMPLETE: CPT | Mod: 26 | Performed by: INTERNAL MEDICINE

## 2022-05-05 PROCEDURE — 80053 COMPREHEN METABOLIC PANEL: CPT | Performed by: INTERNAL MEDICINE

## 2022-05-05 PROCEDURE — 36415 COLL VENOUS BLD VENIPUNCTURE: CPT | Performed by: NURSE PRACTITIONER

## 2022-05-05 PROCEDURE — 82040 ASSAY OF SERUM ALBUMIN: CPT | Performed by: INTERNAL MEDICINE

## 2022-05-05 PROCEDURE — 99207 PR APP CREDIT; MD BILLING SHARED VISIT: CPT | Performed by: INTERNAL MEDICINE

## 2022-05-05 PROCEDURE — G0463 HOSPITAL OUTPT CLINIC VISIT: HCPCS | Performed by: PHYSICIAN ASSISTANT

## 2022-05-05 PROCEDURE — 99232 SBSQ HOSP IP/OBS MODERATE 35: CPT | Performed by: NURSE PRACTITIONER

## 2022-05-05 PROCEDURE — 93306 TTE W/DOPPLER COMPLETE: CPT

## 2022-05-05 PROCEDURE — 85027 COMPLETE CBC AUTOMATED: CPT | Performed by: INTERNAL MEDICINE

## 2022-05-05 RX ORDER — POLYETHYLENE GLYCOL 3350 17 G/17G
17 POWDER, FOR SOLUTION ORAL DAILY PRN
Status: DISCONTINUED | OUTPATIENT
Start: 2022-05-05 | End: 2022-05-07 | Stop reason: HOSPADM

## 2022-05-05 RX ORDER — NITROGLYCERIN 0.4 MG/1
0.4 TABLET SUBLINGUAL EVERY 5 MIN PRN
Status: DISCONTINUED | OUTPATIENT
Start: 2022-05-05 | End: 2022-05-07 | Stop reason: HOSPADM

## 2022-05-05 RX ORDER — LIDOCAINE 40 MG/G
CREAM TOPICAL
Status: DISCONTINUED | OUTPATIENT
Start: 2022-05-05 | End: 2022-05-05

## 2022-05-05 RX ORDER — LIDOCAINE 40 MG/G
CREAM TOPICAL
Status: DISCONTINUED | OUTPATIENT
Start: 2022-05-05 | End: 2022-05-07 | Stop reason: HOSPADM

## 2022-05-05 RX ORDER — MAGNESIUM SULFATE HEPTAHYDRATE 40 MG/ML
2 INJECTION, SOLUTION INTRAVENOUS ONCE
Status: COMPLETED | OUTPATIENT
Start: 2022-05-05 | End: 2022-05-05

## 2022-05-05 RX ORDER — ONDANSETRON 4 MG/1
4 TABLET, ORALLY DISINTEGRATING ORAL EVERY 6 HOURS PRN
Status: DISCONTINUED | OUTPATIENT
Start: 2022-05-05 | End: 2022-05-07 | Stop reason: HOSPADM

## 2022-05-05 RX ORDER — FUROSEMIDE 10 MG/ML
20 INJECTION INTRAMUSCULAR; INTRAVENOUS ONCE
Status: COMPLETED | OUTPATIENT
Start: 2022-05-05 | End: 2022-05-05

## 2022-05-05 RX ORDER — DIGOXIN 125 MCG
125 TABLET ORAL DAILY
Status: DISCONTINUED | OUTPATIENT
Start: 2022-05-05 | End: 2022-05-07 | Stop reason: HOSPADM

## 2022-05-05 RX ORDER — ONDANSETRON 2 MG/ML
4 INJECTION INTRAMUSCULAR; INTRAVENOUS EVERY 6 HOURS PRN
Status: DISCONTINUED | OUTPATIENT
Start: 2022-05-05 | End: 2022-05-07 | Stop reason: HOSPADM

## 2022-05-05 RX ADMIN — DIGOXIN 125 MCG: 125 TABLET ORAL at 14:57

## 2022-05-05 RX ADMIN — FUROSEMIDE 20 MG: 10 INJECTION, SOLUTION INTRAVENOUS at 14:29

## 2022-05-05 RX ADMIN — MAGNESIUM SULFATE HEPTAHYDRATE 2 G: 40 INJECTION, SOLUTION INTRAVENOUS at 08:38

## 2022-05-05 ASSESSMENT — ACTIVITIES OF DAILY LIVING (ADL)
ADLS_ACUITY_SCORE: 13
ADLS_ACUITY_SCORE: 11
ADLS_ACUITY_SCORE: 13
ADLS_ACUITY_SCORE: 18
ADLS_ACUITY_SCORE: 13
ADLS_ACUITY_SCORE: 18
ADLS_ACUITY_SCORE: 13
ADLS_ACUITY_SCORE: 18
ADLS_ACUITY_SCORE: 13

## 2022-05-05 NOTE — PLAN OF CARE
OT: orders rec'd and chart reviewed. Per chart, pt found to have new metastatic lung cancer to bones, found to have retropulsion of compression fracture at T11. Spoke with PT and to pt and pt reports independence with ADl's and no concerns or needs for OT. OT order completed.

## 2022-05-05 NOTE — PROGRESS NOTES
"[facility]    Medicine Progress Note - Hospitalist Service    Date of Admission:  5/4/2022    Assessment & Plan          Gely Tam is a 88 year old female with PMH of HTN, HLD, who presents with new atrial fibrillation with RVR, and suspicion for metastatic lung cancer.    New atrial fibrillation with RVR  Patient denies any palpitations, lightheadedness with walking. She reports onset of leg swelling in December 2021 and eventually was scheduled for outpatient echo on 5/4, which was not done due to rapid afib. Etiology of afib likely related to highly suspicious lung lesions concerning for metastatic cancer (see below). BNP is not elevated--not clearly volume overloaded.    Cardiology consult requested.  Per Meenakshi Tan NP, \"Was on diltiazem and heparin drips yesterday, however overnight she converted to SR and both were discontinued. HR now 68-83.\"    Telemetry, I&O, daily weights    Echocardiogram ordered    IVF ordered per ED due to soft Bps    Keep Mg > 2 and K > 4 with replacement protocols (K replacement protocol could not be ordered--retry again tomorrow)    Per Cardiology, \"SBP continues to be in 90-low 100 range. Unchanged with IV fluid boluses yesterday and discontinuation of diltiazem drip. Would avoid adding BB for rate control due to risk of further hypotension.\"    Right upper lobe mass, osseus metastases suspicious for metastatic lung cancer  Patient reports weight loss since December 2021. Brought to the ED for atrial fibrillation on 5/4 and CXR is notable for a RUL mass. Follow up CT chest shows no PE but a large 4.5cm RUL mass with probable contralateral metastases an bony metastases. On exam patient has significant clubbing of all her fingers. Patient has a 30 pack year history of smoking, quit approx 28 years ago.    HemOnc consult to assist with diagnosis, family requested [facility]    CT abdomen + pelvis with contrast with multiple abnormal findings, including .6 " "cm liver mass consistent with metastasis. Spinal lesions also consistent with metastasis and seen on MRI exam from today. 3 cm simple appearing left adnexal cyst    Discussed with Dr. Florence, plan is ultrasound-guided biopsy of liver mass tomorrow,    also per Dr. Florence, patient does not need to remain in hospital following liver biopsy.  She could follow-up with him in the office for results and further plan.    Chronic mid to lower back pain: Workup includes lumbar XR in December 2021 which showed some degenerative changes but no lesions or fractures. Patient denies any radicular symptoms. Given patient's weight loss, probable diagnosis of lung cancer above, and metastatic bone lesions seen on CT, will benefit from MRI for metastatic, spinal cord evaluation.    Lumbar/thoracic spine MRI with osseous metastases are seen to nearly completely replace the L2 vertebral body, the posterior aspect of the T12 vertebral body completely replace the T11 vertebral body which demonstrates 70-80% pathologic compression deformity and retropulsion    Neurosurgery consult requested, I appreciate Rashawn Mijares's evaluation and recommendations    They recommended TLSO, orthotist can fit it.  Patient refused to wear it, \"it is too heavy.  There is no you spending money on it if I am just going to put it in a closet and never wear it.\"  We discussed the rationale for TLSO, including promoting fracture stability, avoiding spinal cord compression.  Patient is quite steadfast in her refusal to wear TLSO    Lower extremity edema  HTN, HLD  On Lasix 20mg daily prior to admission. Patient is relatively hypotensive here, BNP is not elevated at 700. Patient has LE edema but otherwise does not have evidence of volume overload.    Okay for Regular diet    Received IV fluids in ED    Hold PTA Lasix    Hold PTA ASA    Hold PTA atorvastatin    Echocardiogram as above    COVID-19 ASYMPTOMATIC testing: Patient does not have any abnormal respiratory " "symptoms and is considered LOW SUSPICION for COVID.       Diet: Combination Diet Regular Diet Adult  Snacks/Supplements Adult: Ensure Enlive; Between Meals    DVT Prophylaxis: Enoxaparin (Lovenox) SQ  Newberry Catheter: Not present  Central Lines: None  Cardiac Monitoring: ACTIVE order. Indication: Tachyarrhythmias, acute (48 hours)  Code Status: No CPR- Pre-arrest intubation OK      Disposition Plan   Expected Discharge: 05/06/2022     Anticipated discharge location:  Awaiting care coordination huddle  Delays:     Liver biopsy, clinical stability.      Total time spent:  > 35 minutes  Time spent counseling, coordination of care: 25 minutes including discussion with care team and patient, orthotist, Venkat Dennison, patient's son, cardiologist, Dr Guajardo, regarding atrial fibrillation, anticoagulants, liver biopsy, spinal fractures, TLSO, discharge planning personal review and interpretation of labs and studies as noted above     Arely Gale MD  Hospitalist Service  [facility]  Securely message with the Vocera Web Console (learn more here)  Text page via [employer] Paging/Directory         Clinically Significant Risk Factors Present on Admission              # Moderate Malnutrition: based on nutrition assessment     ______________________________________________________________________    Interval History   \"It is no use spending money on it.  I will just put it in a closet and never wear it.  It is too heavy.\"  Patient seen following TLSO fitting.  She felt that TLSO was too heavy, very uncomfortable, says she will never wear it.  Her main complaint is swelling in her feet, \"I cannot get my shoes on.  I had to buy a bigger size.\"  She also has swelling in her hands, says she has not been able to get her rings off, \"they will have to be cut off.\"  I discussed plan for liver biopsy tomorrow, also Dr. Florence's recommendation that patient does not need to remain in hospital following biopsy, she was " "otherwise medically stable.  Patient stresses, \"if I need to stay until Saturday, that is just fine.\"    Data reviewed today: I reviewed all medications, new labs and imaging results over the last 24 hours. I personally reviewed the abdominal CT image(s) showing  4.6 cm liver mass consistent with metastasis. .    Physical Exam   Vital Signs: Temp: 98  F (36.7  C) Temp src: Oral BP: 106/62 Pulse: 81   Resp: 16 SpO2: 97 % O2 Device: None (Room air)    Weight: 125 lbs 14.12 oz  Constitutional: Awake, alert,  Respiratory: Coarse, upper airway noise  Cardiovascular: Regular rate and rhythm  GI: Normal bowel sounds, soft, non-distended, non-tender  Skin/Integumen: Has bilateral lower extremity edema  Other: Mood is somewhat upset      Data   Recent Labs   Lab 05/05/22  0534 05/05/22  0505 05/04/22  1631 05/04/22  1045   WBC 6.9  --  8.3 7.8   HGB 11.7  --  12.6 13.6   MCV 91  --  90 90     --  335 337     --   --  138   POTASSIUM 3.7  --  3.6 3.5   CHLORIDE 108  --   --  105   CO2 26  --   --  27   BUN 9  --   --  12   CR 0.55  --  0.61 0.61   ANIONGAP 4  --   --  6   ED 8.5  --   --  9.1   * 106*  --  124*   ALBUMIN 2.5*  --   --  3.2*   PROTTOTAL 5.4*  --   --  6.7*   BILITOTAL 0.9  --   --  0.7   ALKPHOS 92  --   --  119   ALT 12  --   --  15   AST 11  --   --  11     Recent Results (from the past 24 hour(s))   CT Abdomen Pelvis w Contrast    Narrative    EXAM: CT ABDOMEN PELVIS W CONTRAST  LOCATION: [facility]  DATE/TIME: 5/4/2022 6:46 PM    INDICATION: Non-small cell lung cancer, staging  COMPARISON: None.  TECHNIQUE: CT scan of the abdomen and pelvis was performed following injection of IV contrast. Multiplanar reformats were obtained. Dose reduction techniques were used.  CONTRAST: 68 mL Isovue 370    FINDINGS:   LOWER CHEST: Faint groundglass opacities present within inferior aspects of right middle and lower lobe.    HEPATOBILIARY: 4.6 x 3.8 cm low-attenuation mass " abutting peripheral capsule segment 8.    PANCREAS: Normal.    SPLEEN: Normal.    ADRENAL GLANDS: Normal.    KIDNEYS/BLADDER: Normal.    BOWEL: Negative.    LYMPH NODES: Normal.    VASCULATURE: Moderate diffuse atherosclerotic plaque.    PELVIC ORGANS: 3 cm simple appearing left adnexal cyst. No free fluid.    MUSCULOSKELETAL: Severe compression of T10 with infiltrative appearance of bone and retropulsion of bone slightly into the central canal. Infiltrative lytic process also present within L2 vertebral body consistent with metastatic disease. Prior right hip   arthroplasty.      Impression    IMPRESSION:   1.  4.6 cm liver mass consistent with metastasis.  2.  Spinal lesions also consistent with metastasis and seen on MRI exam from today.  3.  3 cm simple appearing left adnexal cyst favored to be benign but would be best evaluated by follow-up pelvic ultrasound.   MR Lumbar Spine w/o & w Contrast    Narrative    EXAM: MR LUMBAR SPINE W/O and W CONTRAST  LOCATION: [facility]  DATE/TIME: 5/4/2022 7:58 PM    INDICATION: Back pain and lumbar radiculopathy. Probable metastatic lung cancer.  COMPARISON: CT chest dated 05/04/2022. MRI of thoracic spine dated 05/04/2022.  CONTRAST: 6mL Gadavist  TECHNIQUE: Routine Lumbar Spine MRI without and with IV contrast.    FINDINGS:   Nomenclature is based on 5 lumbar type vertebral bodies. Osseous metastases are seen to nearly completely replace the L2 vertebral body, the posterior aspect of the T12 vertebral body completely replace the T11 vertebral body which demonstrates 70-80%   pathologic compression deformity and retropulsion, and the anterior aspect of the T10 vertebral body. Normal distal spinal cord and cauda equina with conus medullaris at L1. 5 cm mass lesion left upper lung. Unremarkable visualized bony pelvis.    T12-L1: Normal disc height and signal. No herniation. Normal facets. No spinal canal or neural foraminal stenosis.     L1-L2:  Normal disc height and signal. No herniation. Normal facets. No spinal canal or neural foraminal stenosis.    L2-L3: Mild retropulsion of the posterior left aspect of the inferior L3 vertebral body secondary to metastatic disease with moderate secondary effacement of the left neural foramen. Small central disc protrusion. Mild spinal canal stenosis and mild   right neural foraminal stenosis. Moderate bilateral facet arthropathy.     L3-L4: Loss of disc space height and signal.. No herniation. . Mild to moderate facet arthropathy bilaterally. No spinal canal or neural foraminal stenosis.    L4-L5: Mild loss of disc space height. 5 mm of anterolisthesis of L4 on L5. Herniation. Disc bulge. Moderately severe bilateral facet arthropathy. Mild spinal canal stenosis and right and left neural foraminal stenosis.    L5-S1: Moderate loss of disc space height and signal. Mild bilateral facet arthropathy. Central disc bulge. No herniation. No significant spinal canal or neural foraminal stenosis.      Impression    IMPRESSION:  1.  Osseous metastases are seen to nearly completely replace the L2 vertebral body, the posterior aspect of the T12 vertebral body completely replace the T11 vertebral body which demonstrates 70-80% pathologic compression deformity and retropulsion, and   the anterior aspect of the T10 vertebral body.  2.  No high-grade spinal canal or neural foraminal stenosis. Retropulsion of pathologic compression fracture at T11 with moderate effacement of the anterior spinal cord and mild spinal canal stenosis.    Report called to Dr. Mahoney in the [facility] emergency department at 1925 hours.   MR Thoracic Spine w/o & w Contrast    Narrative    EXAM: MR THORACIC SPINE W/O and W CONTRAST  LOCATION: [facility]  DATE/TIME: 5/4/2022 7:57 PM    INDICATION: Bony lesion identified on CT chest study dated 05/04/2022. Probable primary lung malignancy left lung.  COMPARISON: MRI lumbar  spine dated 2022.  CONTRAST: 6mL Gadavist  TECHNIQUE: Routine Thoracic Spine MRI without and with IV contrast.    FINDINGS:   Osseous metastases are seen to nearly completely replace the L2 vertebral body, the posterior aspect of the T12 vertebral body completely replace the T11 vertebral body which demonstrates 70-80% pathologic compression deformity and retropulsion, and the   anterior aspect of the T10 vertebral body. Moderate retropulsion at T11 results in mild effacement of the left anterior aspect of the spinal cord and mild spinal canal stenosis. No other evidence of significant spinal canal or neural foraminal stenosis.   Normal facets.  No abnormal cord signal.     5 cm soft tissue mass left lung. 1.5 cm soft tissue mass right lung. No other extraspinal abnormalities identified.      Impression    IMPRESSION:  1.  Osseous metastases are seen to nearly completely replace the L2 vertebral body, the posterior aspect of the T12 vertebral body completely replace the T11 vertebral body which demonstrates 70-80% pathologic compression deformity and retropulsion, and   the anterior aspect of the T10 vertebral body. Moderate retropulsion at T11 results in mild effacement of the left anterior aspect of the spinal cord and mild spinal canal stenosis. No other evidence of significant spinal canal or neural foraminal   stenosis.   2.  5 cm mass left lung. 1.5 cm mass right lung.    Report called to Dr. Mahoney in the [facility] emergency department at 1925 hours.   Echocardiogram Complete   Result Value    LVEF  60%    Narrative    [id number]  [id number]  [id number]  [id number]^ADIN^DOMINICK^GENNY     [facility]  Echocardiography Laboratory  [address] [place] [address]     Name: MARVA JONES  MRN: [id number]  : 1934  Study Date: 2022 10:35 AM  Age: 88 yrs  Gender: Female  Patient Location: [facility]  Reason For Study: Atrial Fibrillation  Ordering Physician: DOMINICK OAKLEY  GENNY  Referring Physician: Vel Calvert  Performed By: Vaibhav Arroyo     BSA: 1.6 m2  Height: 65 in  Weight: 125 lb  HR: 76  BP: 97/56 mmHg  ______________________________________________________________________________  Procedure  Complete Portable Echo Adult.  ______________________________________________________________________________  Interpretation Summary     1. The left ventricle is normal in structure, function and size. The visual  ejection fraction is estimated at 60%.  2. The right ventricle is normal in structure, function and size.  3. Mild (35-45mmHg) pulmonary hypertension is present. The right ventricular  systolic pressure is approximated at 41mmHg plus the right atrial pressure.     No previous echo for comparison.  ______________________________________________________________________________  Left Ventricle  The left ventricle is normal in structure, function and size. There is normal  left ventricular wall thickness. The visual ejection fraction is estimated at  60%. Left ventricular diastolic function is normal. Normal left ventricular  wall motion.     Right Ventricle  The right ventricle is normal in structure, function and size.     Atria  The left atrium is mildly dilated. The right atrium is moderately dilated.  There is no atrial shunt seen.     Mitral Valve  There is trace mitral regurgitation.     Tricuspid Valve  There is mild (1+) tricuspid regurgitation. Mild (35-45mmHg) pulmonary  hypertension is present. The right ventricular systolic pressure is  approximated at 41mmHg plus the right atrial pressure.     Aortic Valve  The aortic valve is normal in structure and function.     Pulmonic Valve  The pulmonic valve is normal in structure and function.     Vessels  Normal ascending, transverse (arch), and descending aorta. The inferior vena  cava was normal in size with preserved respiratory variability.     Pericardium  There is no pericardial effusion.     Rhythm  Sinus  rhythm was noted.  ______________________________________________________________________________  MMode/2D Measurements & Calculations  IVSd: 0.79 cm     LVIDd: 4.5 cm  LVIDs: 3.3 cm  LVPWd: 0.81 cm  FS: 26.4 %  LV mass(C)d: 115.0 grams  LV mass(C)dI: 71.0 grams/m2  Ao root diam: 3.1 cm  LA dimension: 3.7 cm  asc Aorta Diam: 2.8 cm  LA/Ao: 1.2  LVOT diam: 2.0 cm  LVOT area: 3.3 cm2  LA Volume (BP): 61.0 ml  LA Volume Index (BP): 37.7 ml/m2  RWT: 0.36     Doppler Measurements & Calculations  MV E max jojo: 126.0 cm/sec  MV dec slope: 795.9 cm/sec2  MV dec time: 0.16 sec  PA acc time: 0.17 sec  TR max jojo: 320.3 cm/sec  TR max P.0 mmHg  Lateral E/e': 16.8     ______________________________________________________________________________  Report approved by: Marcell Dillon 2022 12:47 PM           Medications       digoxin  125 mcg Oral Daily     sodium chloride (PF)  3 mL Intracatheter Q8H     sodium chloride (PF)  3 mL Intracatheter Q8H

## 2022-05-05 NOTE — PROVIDER NOTIFICATION
MD Notification    Notified Person: MD    Notified Person Name: Dr. Torres    Notification Date/Time: 0300 5/5/22    Notification Interaction: amcom    Purpose of Notification: heparin RN managed order needed and dilt gtt stop? Pt in normal sinus 60s.    Orders Received: Stop heparin and dilt gtt due to pt no longer in A.Fib.

## 2022-05-05 NOTE — PROGRESS NOTES
[facility]    Cardiology Progress Note    Date of Admission: 5/4/2022  Service Date: 05/05/22    Summary:  Ms. Gely Tam is a very pleasant 88 year old female with a past medical history of hyperlipidemia, hypertension who was admitted on 5/4/2022 for atrial fibrillation and suspicion for metastatic lung cancer. I was asked to see the patient for new onset atrial fibrillation with rapid ventricular rate.    Interval History   Patient was seen in her room with her son Neal present. She is now in NSR and her diltiazem and heparin has been stopped. Denies chest pain, SOB or palpitations. She does have a cough which she and her son report has been present for months. Edema in BLE slightly improved since yesterday. [facility] [place]. Florence has consulted and plan is for liver biopsy tomorrow.       Assessment & Plan   1. New onset atrial fibrillation with RVR: Possibly secondary to metastatic lung cancer. Was on diltiazem and heparin drips yesterday, however overnight she converted to SR and both were discontinued. HR now 68-83.     2. Hypertension: Blood pressure borderline hypotensive 95/53 this morning, not on any antihypertensives at home. SBP continues to be in 90-low 100 range. Unchanged with IV fluid boluses yesterday and discontinuation of diltiazem drip. Would avoid adding BB for rate control due to risk of further hypotension.     3. Lower extremity edema: , home lasix has been on hold considering blood pressure, echocardiogram completed today and awaiting final interpretation. IV fluids given in ER likely causing fluid overload and cough.     4. Left upper lobe lung mass/Spinal bony metastasis: Unintentional weight loss and muscle wasting in neck, chest and arms x 3 months per patient. CT was done which resulted in 4.5 cm mass in left upper lobe, possibly a primary lung malignancy. Indeterminate liver lesions, bony metastatic disease, and contralateral pulmonary metastases  were also seen. A CT abdomen/pelvis was done yesterday showing severe compression of T10 with infiltration of bone and retropulsion of bone slightly into central canal. Infultrative lytic process also present within L2 vertebral body consistent with metastatic disease. A 4.6 cm liver mass consistent with metastasis and 3cm simple appearing L adenexal cyst-likely benign. Neurosurgery was consulted and felt patient would be best treated with a TLSO brace x 3 months, which will be fitted by orthotics. Dr Florence with [facility] consulted and is planning for liver biopsy during hospitalization and brain MRI if biopsy shows consistent with malignancy for staging.     Plan:   1. Monitor rate and rhythm, consider adding DOAC following tomorrows biopsy, avoid BB due to risks of hypotension  2. Plan is for liver biopsy tomorrow, Oncology following, review results  3. Resume home lasix and potassium, monitor cough and edema    I spent 10 minutes face-to-face or coordinating care of Gely Tam. Over 50% of our time on the unit was spent counseling the patient and/or coordinating care.    Thank you for the opportunity to participate in this pleasant patient's care.     SUSAN Meyer, CNP   Nurse Practitioner  [facility]      Patient Active Problem List   Diagnosis     HTN (hypertension)     DJD (degenerative joint disease) of knee     Lipid disorder     S/P total hip arthroplasty     Hypotension     Total knee replacement status     Physical deconditioning     Slow transit constipation     Anemia due to blood loss, acute     Abnormal CT lung screening     Atrial fibrillation with RVR (H)       Physical Exam   Temp: 98.1  F (36.7  C) Temp src: Oral BP: 95/53 Pulse: 83   Resp: 16 SpO2: 96 % O2 Device: None (Room air)    Vitals:    05/04/22 1009 05/05/22 0500   Weight: 61.2 kg (135 lb) 57.1 kg (125 lb 14.1 oz)     Vital Signs with Ranges  Temp:  [97.8  F (36.6  C)-98.1  F (36.7  C)] 98.1  F (36.7   C)  Pulse:  [] 83  Resp:  [8-29] 16  BP: ()/() 95/53  SpO2:  [94 %-100 %] 96 %  I/O last 3 completed shifts:  In: 1000 [IV Piggyback:1000]  Out: -     Constitutional:  Appears her stated age, thin, and in no acute distress.  Eyes: Pupils equal, round. Sclerae anicteric.   HEENT: Normocephalic, atraumatic.   Neck: Supple. Carotid pulses full and equal. No carotid bruit. No anterior cervical or supraclavicular lymphadenopathy appreciated. No JVD appreciated.  Respiratory: Breathing non-labored. Bilateral lung base crackles. Moist cough  Cardiovascular: Regular rate and rhythm, normal S1 and S2. No murmur, rub, or gallop.  GI: Soft, non-distended, non-tender, bowel sounds present in all four quadrants.  Skin: Warm, dry. No rashes, cyanosis, edema, or xanthelasma. Clubbing of all fingers   Musculoskeletal/Extremities: Moves all extremities well and symmetrically. 1+ BLE edema L >R.  Neurologic: No gross focal deficits. Alert, awake, and oriented to person, place and time.  Psychiatric: Affect appropriate. Mentation normal.    Medications     dilTIAZem HCl-Sodium Chloride Stopped (05/05/22 0318)     heparin Stopped (05/04/22 2000)       sodium chloride (PF)  3 mL Intracatheter Q8H     sodium chloride (PF)  3 mL Intracatheter Q8H       Data     Recent Labs   Lab 05/05/22  0534 05/04/22  1631 05/04/22  1045   WBC 6.9 8.3 7.8   HGB 11.7 12.6 13.6   HCT 35.7 39.1 42.5   MCV 91 90 90    335 337     Recent Labs   Lab 05/05/22  0534 05/05/22  0505 05/04/22  1631 05/04/22  1045     --   --  138   POTASSIUM 3.7  --  3.6 3.5   CHLORIDE 108  --   --  105   CO2 26  --   --  27   ANIONGAP 4  --   --  6   * 106*  --  124*   BUN 9  --   --  12   CR 0.55  --  0.61 0.61   GFRESTIMATED 88  --  86 86   ED 8.5  --   --  9.1     Recent Labs   Lab 05/04/22  1045   [id number]        This note was completed in part using Dragon voice recognition software. Although reviewed after completion, some word and  grammatical errors may occur.

## 2022-05-05 NOTE — PROGRESS NOTES
Concetta and asked to discuss MRI and CT findings with her and her daughter; I reviewed the results and her case and have let her know that the CT of abdomen and pelvis shows possible metastatic cancer spread to the liver and the MRI scans of the spine show damage to the thoracic and lumbar spines also possibly due to metastatic cancer. I explained that we do not yet as yet have a prognosis since we do not yet have a diagnosis, which will necessitate biopsy and consultation with Oncology. Once this is done, a treatment plan might be able to be established which may not involve cure of the disease but could palliate it; she and her daughter expressed understanding of these findings and agreement with her care plan, and all questions were answered.

## 2022-05-05 NOTE — CONSULTS
[facility]      Gely Tam MRN# [id number]   YOB: 1934 Age: 88 year old   Date of Admission: 5/4/2022  Requesting physician: Dr. Jeong  Reason for consult: Suspected lung cancer;assist with diagnosis            Assessment and Plan:   Left upper lobe lung mass    -30 pack year h/o smoking but quit smoking 26 years ago    -CT chest 5/4 showed 4.5cms DIANNA lung mass, R lung nodules, R hilar/subcarinal adenopathy, R lobe liver lesion and T11, L2 and left 4th rib bone lesions     -CT abd/pelvis 5/4 showed 4.6 cms segment 8 liver mass, T10 and L2 bone lesions    -extensive digital clubbing bilateral fingers/toes    -findings suspicious for lung cancer metastatic to contralateral lung, liver and bone metastasis    -proceed with CT/USG guided biopsy of liver mass for definitive tissue diagnosis and staging when clinically stable from cardiac stand point    -denied headaches, gait or vision disturbances at this time     -if biopsy consistent with lung cancer needs MRI brain w/wo contrast to complete staging work up.  Will obtain NGS lung cancer panel to see if she has actionable mutations that would enable offering targeted treatments    -anticipate she will have the biopsy during this hospitalization with plan for outpatient follow up in oncology clinic       Bone metastasis  Vertebral fractures/metastasis     -MRI thoracic/lumbar spine 5/4 show findings concerning for T10, T11, L2    -retropulsion of compression fracture at T11 causing moderate effacement of spinal cord with mild spinal canal stenosis.  No cord compression    -noted referral for neurosurgery consultation    -?need for brace    -pain control    -outpatient [facility]     New onset atrial fibrillation with RVR    -echocardiogram    -on diltiazem gtt for rate control with plan to transition to oral Dilzem     -on heparin gtt    -need to hold heparin gtt 4-6 hours prior to liver mass biopsy.   Anticipate transitioning to DOACs at  "time if discharge - defer to cardiology.      DNR    Thank you for the consult.  We will continue to follow her with you.  At the request of Gely I did call Neal Tam (son) and updated him with the plan above.      Siva Florence MD     Addendum 2022 at 12:35pm:    Discussed with hospitalist team who updated me that she has now reverted to sinus rhythm and is off the diltiazem and heparin gtt.  Hemodynamically and clinically stable.  Plan is to arrange for CT guided biopsy of the liver mass and outpatient follow up in Oncology clinic next week.  Discussed plan with hospitalist.      Dr. Florence            Chief Complaint:   Palpitations           History of Present Illness:   Gely Tam is an 88 year old lady with a prior h/o HTN, HLD and smoking.  She was scheduled to undergo outpatient echocardiogram but was sent to the ER for further evaluation of her palpitations and bilateral lower extremity edema.  In the hospital found to have Afib with RVR and imaging findings concerning for possible metastatic lung cancer.     She reported about a 5lb unintentional weight loss since 2021.  She also reported a dry cough.  Has a 30-pack-year smoking history but reported quitting smoking 26 years ago.  Denied hemoptysis.  Denied headaches, nausea, vomiting or gait disturbances.  She lives by herself at home.  Does her own grocery shopping.  Drives her car.  Reported having 3 sons.    She reported being independent in her ADLs and IADLs.         Physical Exam:   Vitals were reviewed  Blood pressure 100/51, pulse 73, temperature 97.8  F (36.6  C), temperature source Oral, resp. rate 21, height 1.651 m (5' 5\"), weight 57.1 kg (125 lb 14.1 oz), SpO2 98 %.  Temperatures:  Current - Temp: 97.8  F (36.6  C); Max - Temp  Av.1  F (36.7  C)  Min: 97.8  F (36.6  C)  Max: 98.3  F (36.8  C)  Respiration range: Resp  Av.9  Min: 8  Max: 29  Pulse range: Pulse  Av.1  Min: 66  Max: 151  Blood pressure range: " Systolic (24hrs), Av , Min:94 , Max:134   ; Diastolic (24hrs), Av, Min:48, Max:107    Pulse oximetry range: SpO2  Av.9 %  Min: 94 %  Max: 100 %    Intake/Output Summary (Last 24 hours) at 2022 0736  Last data filed at 2022 1556  Gross per 24 hour   Intake 1000 ml   Output --   Net 1000 ml       GENERAL: No acute distress.  SKIN: No rashes or jaundice.  HEENT: No conjunctival pallor or scleral icterus  LYMPH: No palpable lymphadenopathy in the cervical, supraclavicular regions.  HEART: irregular rhythm  LUNGS: Clear bilaterally.  ABDOMEN: Soft, nontender, nondistended with no palpable hepatosplenomegaly.  EXTREMITIES: digital clubbing bilateral fingers/toes  NEURO: no focal motor deficits.              Past Medical History:   I have reviewed this patient's past medical history  Past Medical History:   Diagnosis Date     Arthritis      HTN (hypertension)      Hyperlipemia              Past Surgical History:   I have reviewed this patient's past surgical history  Past Surgical History:   Procedure Laterality Date     ARTHROPLASTY HIP  10/26/2012    Procedure: ARTHROPLASTY HIP;  RIGHT TOTAL HIP ARTHROPLASTY (J&J)^;  Surgeon: Diana Mcclellan MD;  Location: [facility] OR     ARTHROPLASTY KNEE Left 2018    Procedure: ARTHROPLASTY KNEE;  LEFT TOTAL KNEE ARTHROPLASTY ;  Surgeon: Diana Mcclellan MD;  Location:  OR     GYN SURGERY       HYSTERECTOMY      left ovary remains     LAPAROSCOPIC SALPINGOTOMY      bilat,. benign      ORTHOPEDIC SURGERY      right tka     SOFT TISSUE SURGERY   vein ligation               Social History:   I have reviewed this patient's social history  Social History     Tobacco Use     Smoking status: Former Smoker     Packs/day: 1.00     Years: 30.00     Pack years: 30.00     Quit date: 1995     Years since quittin.2     Smokeless tobacco: Never Used   Substance Use Topics     Alcohol use: Yes     Alcohol/week: 0.0 standard drinks     Comment:  1/day   wine             Family History:   I have reviewed this patient's family history  Family History   Problem Relation Age of Onset     Breast Cancer Other         aunt             Allergies:   No Known Allergies          Medications:   I have reviewed this patient's current medications  Medications Prior to Admission   Medication Sig Dispense Refill Last Dose     acetaminophen (TYLENOL) 325 MG tablet Take 2 tablets (650 mg) by mouth every 4 hours as needed for other (surgical pain) 60 tablet 0 Past Week at Unknown time     aspirin 81 MG EC tablet Take 81 mg by mouth daily   5/3/2022 at 1200     atorvastatin (LIPITOR) 10 MG tablet Take 10 mg by mouth daily.   5/3/2022 at 1200     calcium carbonate 600 mg-vitamin D 400 units (CALTRATE) 600-400 MG-UNIT per tablet Take 1 tablet by mouth 2 times daily   5/3/2022 at Unknown time     furosemide (LASIX) 20 MG tablet Take 20 mg by mouth daily   5/3/2022 at 1200     potassium chloride ER (KLOR-CON M) 10 MEQ CR tablet Take 10 mEq by mouth daily   5/3/2022 at 1200     AMOXICILLIN PO Take 2,000 mg by mouth daily as needed (1 hour prior to dental procedures)        Current Facility-Administered Medications Ordered in Epic   Medication Dose Route Frequency Last Rate Last Admin     acetaminophen (TYLENOL) tablet 650 mg  650 mg Oral Q6H PRN        Or     acetaminophen (TYLENOL) Suppository 650 mg  650 mg Rectal Q6H PRN         diltiazem (CARDIZEM) 125 mg in sodium chloride 0.9 % 125 mL infusion  5-15 mg/hr Intravenous Continuous   Stopped at 05/05/22 0318     heparin 25,000 units in 0.45% NaCl 250 mL ANTICOAGULANT infusion  0-5,000 Units/hr Intravenous Continuous   Paused at 05/04/22 2000     lidocaine (LMX4) cream   Topical Q1H PRN         lidocaine 1 % 0.1-1 mL  0.1-1 mL Other Q1H PRN         melatonin tablet 1 mg  1 mg Oral At Bedtime PRN   1 mg at 05/04/22 7545     nitroGLYcerin (NITROSTAT) sublingual tablet 0.4 mg  0.4 mg Sublingual Q5 Min PRN         ondansetron (ZOFRAN  ODT) ODT tab 4 mg  4 mg Oral Q6H PRN        Or     ondansetron (ZOFRAN) injection 4 mg  4 mg Intravenous Q6H PRN         polyethylene glycol (MIRALAX) Packet 17 g  17 g Oral Daily PRN         sodium chloride (PF) 0.9% PF flush 3 mL  3 mL Intracatheter Q8H         sodium chloride (PF) 0.9% PF flush 3 mL  3 mL Intracatheter q1 min prn         sodium chloride (PF) 0.9% PF flush 3 mL  3 mL Intracatheter Q8H         sodium chloride (PF) 0.9% PF flush 3 mL  3 mL Intracatheter q1 min prn         No current Epic-ordered outpatient medications on file.             Review of Systems:     The 10 point Review of Systems is negative other than noted in the HPI.            Data:   All laboratory data reviewed  Results for orders placed or performed during the hospital encounter of 05/04/22 (from the past 24 hour(s))   XR Chest 2 Views    Narrative    CHEST TWO VIEWS May 4, 2022 10:39 AM     HISTORY: Palpitations.    COMPARISON: None.       Impression    IMPRESSION: Suspected right upper lobe mass versus less likely  masslike consolidation. Nodules in the right upper lobe measuring up  to 1 cm noted. CT scan recommended for further evaluation. No definite  consolidation otherwise in the lungs. The cardiac silhouette is not  enlarged. Pulmonary vasculature is unremarkable. Lower thoracic spine  compression deformity.    VANI GONSALEZ MD         SYSTEM ID:  [id number]   CBC with platelets differential    Narrative    The following orders were created for panel order CBC with platelets differential.  Procedure                               Abnormality         Status                     ---------                               -----------         ------                     CBC with platelets and d...[[id number]]                      Final result                 Please view results for these tests on the individual orders.   Comprehensive metabolic panel   Result Value Ref Range    Sodium 138 133 - 144 mmol/L    Potassium 3.5 3.4 - 5.3  mmol/L    Chloride 105 94 - 109 mmol/L    Carbon Dioxide (CO2) 27 20 - 32 mmol/L    Anion Gap 6 3 - 14 mmol/L    Urea Nitrogen 12 7 - 30 mg/dL    Creatinine 0.61 0.52 - 1.04 mg/dL    Calcium 9.1 8.5 - 10.1 mg/dL    Glucose 124 (H) 70 - 99 mg/dL    Alkaline Phosphatase 119 40 - 150 U/L    AST 11 0 - 45 U/L    ALT 15 0 - 50 U/L    Protein Total 6.7 (L) 6.8 - 8.8 g/dL    Albumin 3.2 (L) 3.4 - 5.0 g/dL    Bilirubin Total 0.7 0.2 - 1.3 mg/dL    GFR Estimate 86 >60 mL/min/1.73m2   Troponin I   Result Value Ref Range    Troponin I High Sensitivity 12 <54 ng/L   CBC with platelets and differential   Result Value Ref Range    WBC Count 7.8 4.0 - 11.0 10e3/uL    RBC Count 4.73 3.80 - 5.20 10e6/uL    Hemoglobin 13.6 11.7 - 15.7 g/dL    Hematocrit 42.5 35.0 - 47.0 %    MCV 90 78 - 100 fL    MCH 28.8 26.5 - 33.0 pg    MCHC 32.0 31.5 - 36.5 g/dL    RDW 14.9 10.0 - 15.0 %    Platelet Count 337 150 - 450 10e3/uL    % Neutrophils 71 %    % Lymphocytes 21 %    % Monocytes 8 %    % Eosinophils 0 %    % Basophils 0 %    % Immature Granulocytes 0 %    NRBCs per 100 WBC 0 <1 /100    Absolute Neutrophils 5.5 1.6 - 8.3 10e3/uL    Absolute Lymphocytes 1.6 0.8 - 5.3 10e3/uL    Absolute Monocytes 0.6 0.0 - 1.3 10e3/uL    Absolute Eosinophils 0.0 0.0 - 0.7 10e3/uL    Absolute Basophils 0.0 0.0 - 0.2 10e3/uL    Absolute Immature Granulocytes 0.0 <=0.4 10e3/uL    Absolute NRBCs 0.0 10e3/uL   Nt probnp inpatient (BNP)   Result Value Ref Range    N terminal Pro BNP Inpatient 748 0 - 1,800 pg/mL   Magnesium   Result Value Ref Range    Magnesium 1.9 1.6 - 2.3 mg/dL   CT Chest Pulmonary Embolism w Contrast    Narrative    CT CHEST PULMONARY EMBOLISM WITH CONTRAST 5/4/2022 12:40 PM    CLINICAL HISTORY: Chest pain.     TECHNIQUE: CT angiogram chest during arterial phase injection IV  contrast. 2D and 3D MIP reconstructions were performed by the CT  technologist. Dose reduction techniques were used.     CONTRAST: 57mL Isovue-370    COMPARISON:  None.    FINDINGS:  ANGIOGRAM CHEST: Pulmonary arteries are normal caliber and negative  for pulmonary emboli. Thoracic aorta is negative for dissection. No CT  evidence of right heart strain.    LUNGS AND PLEURA: 4.5 cm mass in the left upper lobe. Nodules on the  right measuring up to 1.5 cm. No effusions.    MEDIASTINUM/AXILLAE: Right hilar and subcarinal adenopathy with a  right hilar lymph node measuring 1.9 x 1.5 cm.    UPPER ABDOMEN: Indeterminate liver lesion in the posterior right lobe  measuring 4.1 cm.    MUSCULOSKELETAL: T11 compression deformity appears pathologic. Erosive  bony lesion in L2. Destructive bony lesion in the anterolateral left  fourth rib.      Impression    IMPRESSION:  1.  No pulmonary embolism demonstrated.  2.  4.5 cm mass in the left upper lobe, possibly a primary lung  malignancy.  3.  Contralateral pulmonary metastases.  4.  Indeterminant liver lesions.  5.  Bony metastatic disease.    VANI GONSALEZ MD         SYSTEM ID:  [id number]   CBC with platelets   Result Value Ref Range    WBC Count 8.3 4.0 - 11.0 10e3/uL    RBC Count 4.33 3.80 - 5.20 10e6/uL    Hemoglobin 12.6 11.7 - 15.7 g/dL    Hematocrit 39.1 35.0 - 47.0 %    MCV 90 78 - 100 fL    MCH 29.1 26.5 - 33.0 pg    MCHC 32.2 31.5 - 36.5 g/dL    RDW 15.1 (H) 10.0 - 15.0 %    Platelet Count 335 150 - 450 10e3/uL   Creatinine   Result Value Ref Range    Creatinine 0.61 0.52 - 1.04 mg/dL    GFR Estimate 86 >60 mL/min/1.73m2   Potassium   Result Value Ref Range    Potassium 3.6 3.4 - 5.3 mmol/L   Asymptomatic COVID-19 Virus (Coronavirus) by PCR Nasopharyngeal    Specimen: Nasopharyngeal; Swab   Result Value Ref Range    SARS CoV2 PCR Negative Negative    Narrative    Testing was performed using the Xpert Xpress SARS-CoV-2 Assay on the   Cepheid Gene-Xpert Instrument Systems. Additional information about   this Emergency Use Authorization (EUA) assay can be found via the Lab   Guide. This test should be ordered for the detection of  SARS-CoV-2 in   individuals who meet SARS-CoV-2 clinical and/or epidemiological   criteria. Test performance is unknown in asymptomatic patients. This   test is for in vitro diagnostic use under the FDA EUA for   laboratories certified under CLIA to perform high complexity testing.   This test has not been FDA cleared or approved. A negative result   does not rule out the presence of PCR inhibitors in the specimen or   target RNA in concentration below the limit of detection for the   assay. The possibility of a false negative should be considered if   the patient's recent exposure or clinical presentation suggests   COVID-19. This test was validated by the [facility]. This laboratory is certified under the Clinical Laboratory Improvement Amendments of 1988 (CLIA-88) as qualified to perform high complexity laboratory testing.     CT Abdomen Pelvis w Contrast    Narrative    EXAM: CT ABDOMEN PELVIS W CONTRAST  LOCATION: [facility]  DATE/TIME: 5/4/2022 6:46 PM    INDICATION: Non-small cell lung cancer, staging  COMPARISON: None.  TECHNIQUE: CT scan of the abdomen and pelvis was performed following injection of IV contrast. Multiplanar reformats were obtained. Dose reduction techniques were used.  CONTRAST: 68 mL Isovue 370    FINDINGS:   LOWER CHEST: Faint groundglass opacities present within inferior aspects of right middle and lower lobe.    HEPATOBILIARY: 4.6 x 3.8 cm low-attenuation mass abutting peripheral capsule segment 8.    PANCREAS: Normal.    SPLEEN: Normal.    ADRENAL GLANDS: Normal.    KIDNEYS/BLADDER: Normal.    BOWEL: Negative.    LYMPH NODES: Normal.    VASCULATURE: Moderate diffuse atherosclerotic plaque.    PELVIC ORGANS: 3 cm simple appearing left adnexal cyst. No free fluid.    MUSCULOSKELETAL: Severe compression of T10 with infiltrative appearance of bone and retropulsion of bone slightly into the central canal. Infiltrative lytic  process also present within L2 vertebral body consistent with metastatic disease. Prior right hip   arthroplasty.      Impression    IMPRESSION:   1.  4.6 cm liver mass consistent with metastasis.  2.  Spinal lesions also consistent with metastasis and seen on MRI exam from today.  3.  3 cm simple appearing left adnexal cyst favored to be benign but would be best evaluated by follow-up pelvic ultrasound.   MR Lumbar Spine w/o & w Contrast    Narrative    EXAM: MR LUMBAR SPINE W/O and W CONTRAST  LOCATION: [facility]  DATE/TIME: 5/4/2022 7:58 PM    INDICATION: Back pain and lumbar radiculopathy. Probable metastatic lung cancer.  COMPARISON: CT chest dated 05/04/2022. MRI of thoracic spine dated 05/04/2022.  CONTRAST: 6mL Gadavist  TECHNIQUE: Routine Lumbar Spine MRI without and with IV contrast.    FINDINGS:   Nomenclature is based on 5 lumbar type vertebral bodies. Osseous metastases are seen to nearly completely replace the L2 vertebral body, the posterior aspect of the T12 vertebral body completely replace the T11 vertebral body which demonstrates 70-80%   pathologic compression deformity and retropulsion, and the anterior aspect of the T10 vertebral body. Normal distal spinal cord and cauda equina with conus medullaris at L1. 5 cm mass lesion left upper lung. Unremarkable visualized bony pelvis.    T12-L1: Normal disc height and signal. No herniation. Normal facets. No spinal canal or neural foraminal stenosis.     L1-L2: Normal disc height and signal. No herniation. Normal facets. No spinal canal or neural foraminal stenosis.    L2-L3: Mild retropulsion of the posterior left aspect of the inferior L3 vertebral body secondary to metastatic disease with moderate secondary effacement of the left neural foramen. Small central disc protrusion. Mild spinal canal stenosis and mild   right neural foraminal stenosis. Moderate bilateral facet arthropathy.     L3-L4: Loss of disc space height and  signal.. No herniation. . Mild to moderate facet arthropathy bilaterally. No spinal canal or neural foraminal stenosis.    L4-L5: Mild loss of disc space height. 5 mm of anterolisthesis of L4 on L5. Herniation. Disc bulge. Moderately severe bilateral facet arthropathy. Mild spinal canal stenosis and right and left neural foraminal stenosis.    L5-S1: Moderate loss of disc space height and signal. Mild bilateral facet arthropathy. Central disc bulge. No herniation. No significant spinal canal or neural foraminal stenosis.      Impression    IMPRESSION:  1.  Osseous metastases are seen to nearly completely replace the L2 vertebral body, the posterior aspect of the T12 vertebral body completely replace the T11 vertebral body which demonstrates 70-80% pathologic compression deformity and retropulsion, and   the anterior aspect of the T10 vertebral body.  2.  No high-grade spinal canal or neural foraminal stenosis. Retropulsion of pathologic compression fracture at T11 with moderate effacement of the anterior spinal cord and mild spinal canal stenosis.    Report called to Dr. Mahoney in the [facility] emergency department at 1925 hours.   MR Thoracic Spine w/o & w Contrast    Narrative    EXAM: MR THORACIC SPINE W/O and W CONTRAST  LOCATION: [facility]  DATE/TIME: 5/4/2022 7:57 PM    INDICATION: Bony lesion identified on CT chest study dated 05/04/2022. Probable primary lung malignancy left lung.  COMPARISON: MRI lumbar spine dated 05/04/2022.  CONTRAST: 6mL Gadavist  TECHNIQUE: Routine Thoracic Spine MRI without and with IV contrast.    FINDINGS:   Osseous metastases are seen to nearly completely replace the L2 vertebral body, the posterior aspect of the T12 vertebral body completely replace the T11 vertebral body which demonstrates 70-80% pathologic compression deformity and retropulsion, and the   anterior aspect of the T10 vertebral body. Moderate retropulsion at T11 results in mild  effacement of the left anterior aspect of the spinal cord and mild spinal canal stenosis. No other evidence of significant spinal canal or neural foraminal stenosis.   Normal facets.  No abnormal cord signal.     5 cm soft tissue mass left lung. 1.5 cm soft tissue mass right lung. No other extraspinal abnormalities identified.      Impression    IMPRESSION:  1.  Osseous metastases are seen to nearly completely replace the L2 vertebral body, the posterior aspect of the T12 vertebral body completely replace the T11 vertebral body which demonstrates 70-80% pathologic compression deformity and retropulsion, and   the anterior aspect of the T10 vertebral body. Moderate retropulsion at T11 results in mild effacement of the left anterior aspect of the spinal cord and mild spinal canal stenosis. No other evidence of significant spinal canal or neural foraminal   stenosis.   2.  5 cm mass left lung. 1.5 cm mass right lung.    Report called to Dr. Mahoney in the [facility] emergency department at 1925 hours.   Glucose by meter   Result Value Ref Range    GLUCOSE BY METER POCT 106 (H) 70 - 99 mg/dL   Comprehensive metabolic panel   Result Value Ref Range    Sodium 138 133 - 144 mmol/L    Potassium 3.7 3.4 - 5.3 mmol/L    Chloride 108 94 - 109 mmol/L    Carbon Dioxide (CO2) 26 20 - 32 mmol/L    Anion Gap 4 3 - 14 mmol/L    Urea Nitrogen 9 7 - 30 mg/dL    Creatinine 0.55 0.52 - 1.04 mg/dL    Calcium 8.5 8.5 - 10.1 mg/dL    Glucose 114 (H) 70 - 99 mg/dL    Alkaline Phosphatase 92 40 - 150 U/L    AST 11 0 - 45 U/L    ALT 12 0 - 50 U/L    Protein Total 5.4 (L) 6.8 - 8.8 g/dL    Albumin 2.5 (L) 3.4 - 5.0 g/dL    Bilirubin Total 0.9 0.2 - 1.3 mg/dL    GFR Estimate 88 >60 mL/min/1.73m2   CBC with platelets   Result Value Ref Range    WBC Count 6.9 4.0 - 11.0 10e3/uL    RBC Count 3.91 3.80 - 5.20 10e6/uL    Hemoglobin 11.7 11.7 - 15.7 g/dL    Hematocrit 35.7 35.0 - 47.0 %    MCV 91 78 - 100 fL    MCH 29.9 26.5 - 33.0 pg    MCHC  32.8 31.5 - 36.5 g/dL    RDW 15.5 (H) 10.0 - 15.0 %    Platelet Count 299 150 - 450 10e3/uL

## 2022-05-05 NOTE — CONSULTS
NEUROSURGERY CONSULT    Neurosurgery was asked by Dr. Jeong to consult for spinal osseous metastasis. .      PLAN:  Ms. Tam's most recent imaging exhibits osseous metastases to nearly completely replace the L2 vertebral body, anterior aspect of the T10 vertebral body, the posterior aspect of the T12 vertebral body and the T11 vertebral body which demonstrates 70-80% pathologic compression deformity and retropulsion with moderate effacement of the anterior spinal cord and mild spinal canal stenosis. There is no high-grade spinal canal or neural foraminal stenosis.  Ms. Tam is doing quite well. Currently, we do not recommend surgical intervention.     We do feel that it would be in her best interest to wear a brace for stability during mobilization. We will ask our colleagues in Orthotics to stop by to fit and supply her with a  TLSO. We instructed the patient to wear the brace when out of bed, but may shower without the brace on. The brace will most likely be required for 3 months. Certainly appreciate involvement from the Hematology/Oncology and Medicine teams.     We do suggest that she not lift anything greater than 5-10 pounds for six weeks and avoid excessive bending, twisting, and turning.     We did review the images together and we explained his condition and the recommended treatment.     Please page our on-call service for any questions or concerns.     It has been a pleasure meeting Gely Tam. Thank you for having us be involved in her care.    ______________________________________________________________________    HPI:    Gely Tam is a pleasant 88 year old female who presented to the [facility] Emergency Department on 05/04/2022 for consultation on palpitations. She has a known left upper lobe lung mass. Throughout her workup the imaging exhibit findings suggestive of lung cancer metastasis to her contralateral lung, liver, and bone. A lumbar XR dated 12/21 did not show neil concerning  "pathology.   During my evaluation she describes her back symptoms as an intermittent dull, ache that doesn't seem to bother her much. This pain is not accompanied with paresthesia, numbness, or weakness although she does state that her toes do have \"pins and needles at times\" Mobility seems to aggravate the symptoms, while alleviation is obtained by lying down and a heat pad.     There are no bowel or bladder changes. No other concerns are voiced.    Past Medical History:   Diagnosis Date     Arthritis      HTN (hypertension)      Hyperlipemia        Past Surgical History:   Procedure Laterality Date     ARTHROPLASTY HIP  10/26/2012    Procedure: ARTHROPLASTY HIP;  RIGHT TOTAL HIP ARTHROPLASTY (J&J)^;  Surgeon: Diana Mcclellan MD;  Location: [facility] OR     ARTHROPLASTY KNEE Left 2018    Procedure: ARTHROPLASTY KNEE;  LEFT TOTAL KNEE ARTHROPLASTY ;  Surgeon: Diana Mcclellan MD;  Location: [facility] OR     GYN SURGERY       HYSTERECTOMY      left ovary remains     LAPAROSCOPIC SALPINGOTOMY      bilat,. benign      ORTHOPEDIC SURGERY      right tka     SOFT TISSUE SURGERY   vein ligation       No Known Allergies    Social History     Tobacco Use     Smoking status: Former Smoker     Packs/day: 1.00     Years: 30.00     Pack years: 30.00     Quit date: 1995     Years since quittin.2     Smokeless tobacco: Never Used   Substance Use Topics     Alcohol use: Yes     Alcohol/week: 0.0 standard drinks     Comment: 1/day   wine       Family History   Problem Relation Age of Onset     Breast Cancer Other         aunt       Scheduled Medications      magnesium sulfate  2 g Intravenous Once     sodium chloride (PF)  3 mL Intracatheter Q8H     sodium chloride (PF)  3 mL Intracatheter Q8H       Home Medications  Aspirin 325 mg   Lipitor  Senna-docusate   Lasix   Zolpidem tartrate  Diovan     PRN Medications    acetaminophen **OR** acetaminophen, lidocaine 4%, lidocaine (buffered or not buffered), " "melatonin, nitroGLYcerin, ondansetron **OR** ondansetron, polyethylene glycol, sodium chloride (PF), sodium chloride (PF)    ROS: 10 point ROS neg other than the symptoms noted above in the HPI.    Vitals:    BP 98/45 (BP Location: Left arm)   Pulse 74   Temp 98.1  F (36.7  C) (Oral)   Resp 16   Ht 5' 5\" (1.651 m)   Wt 125 lb 14.1 oz (57.1 kg)   SpO2 97%   BMI 20.95 kg/m    Body mass index is 20.95 kg/m .  I/O last 3 completed shifts:  In: 1000 [IV Piggyback:1000]  Out: -     Exam:    Patient appears comfortable, conversational, and in no apparent distress.   Head: Normocephalic, without obvious abnormality, atraumatic, no facial asymmetry.   Eyes: conjunctivae/corneas clear. PERRL, EOM's intact.   Throat: lips, mucosa, and tongue normal; teeth and gums normal.   Neck: supple, symmetrical, trachea midline, no adenopathy and thyroid: not enlarged, symmetric, no tenderness/mass/nodules.   Lungs: clear to auscultation bilaterally.   Heart: regular rate and rhythm.   Abdomen: soft, non-tender; bowel sounds normal; no masses, no organomegaly.   Pulses: 2+ and symmetric.   Skin: Skin color, texture, turgor normal. No rashes or lesions.     CN II-XII grossly intact, alert and appropriate with conversation and following commands.   Cervical spine is non tender to palpation. Appropriate range of motion of neck, not concerning for lhermitte's phenomenon.   Bilateral bicep 2/4 and tricep reflexes 1/4. Sensation intact throughout upper extremities.     UE muscle strength  Right  Left    Deltoid  5/5  5/5    Biceps  5/5  5/5    Triceps  5/5  5/5    Hand intrinsics  5/5  5/5    Hand grasp  5/5  5/5    [place] signs  neg  neg      Lumbar spine is non tender to palpation   Intact sensation throughout lower extremities.   Bilateral patellar 2/4 and achilles reflex 1/4. Negative for pain with straight leg raise.     LE muscle strength  Right  Left    Iliopsoas (hip flexion)  5/5  5/5    Quad (knee extension)  5/5  5/5  "   Hamstring (knee flexion)  5/5  5/5    Gastrocnemius (PF)  5/5  5/5    Tibialis Ant. (DF)  5/5  5/5    EHL  5/5  5/5      Negative Babinski bilaterally. Negative for clonus.   Calves are soft and non-tender bilaterally.     ECG/Telemetry:  ECG obtained at 1014, ECG read at 1022  Undetermined rhythm. Incomplete right bundle branch block. Marked ST abnormality, possible inferior subendocardial injury. Abnormal ECG.    Rate 164 bpm. CA interval * ms. QRS duration 108 ms. QT/QTc 326/538 ms. P-R-T axes * 85 -60.    Imaging:   Impression per radiology read - I have personally reviewed the images with the patient.    MR THORACIC SPINE W/O and W CONTRAST - 5/4/2022 7:57 PM    1.  Osseous metastases are seen to nearly completely replace the L2 vertebral body, the posterior aspect of the T12 vertebral body completely replace the T11 vertebral body which demonstrates 70-80% pathologic compression deformity and retropulsion, and   the anterior aspect of the T10 vertebral body. Moderate retropulsion at T11 results in mild effacement of the left anterior aspect of the spinal cord and mild spinal canal stenosis. No other evidence of significant spinal canal or neural foraminal stenosis.   2.  5 cm mass left lung. 1.5 cm mass right lung.    MR LUMBAR SPINE W/O and W CONTRAST - 5/4/2022 7:58 PM    1.  Osseous metastases are seen to nearly completely replace the L2 vertebral body, the posterior aspect of the T12 vertebral body completely replace the T11 vertebral body which demonstrates 70-80% pathologic compression deformity and retropulsion, and   the anterior aspect of the T10 vertebral body.  2.  No high-grade spinal canal or neural foraminal stenosis. Retropulsion of pathologic compression fracture at T11 with moderate effacement of the anterior spinal cord and mild spinal canal stenosis.    Available labs at time of consult:    Recent Labs   Lab 05/05/22  0534 05/04/22  1631 05/04/22  1045   WBC 6.9 8.3 7.8   HGB 11.7 12.6  13.6   HCT 35.7 39.1 42.5   MCV 91 90 90    335 337     Recent Labs   Lab 05/05/22  0534 05/04/22  1631 05/04/22  1045   WBC 6.9 8.3 7.8   HGB 11.7 12.6 13.6   HCT 35.7 39.1 42.5   MCV 91 90 90    335 337     No results for input(s): SED, CRP in the last 168 hours.  Recent Labs   Lab 05/05/22  0534 05/04/22  1631 05/04/22  1045   HGB 11.7 12.6 13.6     No results for input(s): INR in the last 168 hours.  Recent Labs   Lab 05/05/22  0534 05/04/22  1631 05/04/22  1045    335 337     Recent Labs   Lab 05/05/22  0534 05/04/22  1631 05/04/22  1045   WBC 6.9 8.3 7.8         Respectfully,    ZULMA Franklin, PA-C  [facility]     Tel: [phone]      All imaging, physical findings, and the above plan have been reviewed with Dr. Chow.

## 2022-05-05 NOTE — PROGRESS NOTES
S:  We received an order for a TLSO for room 309-01.  O:  I donned a [place] 464 TLSO and adjusted it to the size of her body and the brace fits adequate.  A:  The brace fits adequate.  The patient has refused the brace stating it would be a waste of money because she will not wear it.    P:  GROVER CONTRERAS

## 2022-05-05 NOTE — PROGRESS NOTES
Pt scheduled for a CT Liver Bx on 5/6. Procedure scheduled for 1300 per CT. Pt should be NPO after 0900. Favio Garcia RN notified.

## 2022-05-05 NOTE — ED PROVIDER NOTES
88-year-old with new onset A. fib with RVR on heparin and diltiazem drip who was diagnosed with likely metastatic lung cancer based on her imaging here today.  I got a call from [facility] regarding her MRI thoracic and lumbar spines which were obtained by the hospitalist team, she does have metastatic disease predominantly to T10, 11 and 12 as well as L2.  She does have pathologic fracture at T11 with moderate retropulsion and 70% loss of height.  I did relay these findings to inpatient team, will plan for continued bedrest and not emergent spine surgery consultation    Doug Mahoney MD  [employer]  9:28 PM 05/04/22         Doug Mahoney MD  05/04/22 [id number]

## 2022-05-05 NOTE — CONSULTS
CLINICAL NUTRITION SERVICES  -  ASSESSMENT NOTE      Recommendations Ordered by Registered Dietitian (RD):   Chocolate Ensure Enlive at 2 pm   Malnutrition:   % Weight Loss:  Up to 7.5% in 3 months (moderate malnutrition)  % Intake:  <75% for >/= 3 months (moderate malnutrition)  Subcutaneous Fat Loss:  Orbital region mild-moderate depletion and Upper arm region mild-moderate depletion  Muscle Loss:  Temporal region mild depletion, Clavicle bone region moderate depletion, Acromion bone region moderate depletion and Dorsal hand region mild depletion  Fluid Retention:  Mild     Malnutrition Diagnosis: Moderate malnutrition  In Context of:  Acute illness or injury        REASON FOR ASSESSMENT  Gely Tam is a 88 year old female seen by Registered Dietitian for Admission Nutrition Risk Screen for weight loss (unsure amount), denied decreased appetite/poor intake    DX:   New atrial fibrillation with RVR   Suspicion for metastatic lung cancer   Patient reports weight loss since December 2021   Lower extremity edema     PMH:   Arthritis   Hypertension   Hyperlipidemia   DJD   Acute anemia   Physical deconditioning       NUTRITION HISTORY  - Information obtained from patient and Epic records.  - Patient is on a Regular diet at home.  - Typical food/fluid intake is fair-good..  - Diet history:  She reports that she is a healthy eater but will also eat ice cream, malts, and baked goods if offered. Breakfast: juice, muffin or mini pancake or mini waffle, fruit, coffee. Lunch: soup and fruit. Dinner: protein item (likes fish), salad, fruit. Over the past several months, she suspects her intake has been decreased overall as just not feeling well, fatigued and having pain in back and foot. She drinks 2 glasses wine per night.   - Supplements:  None.   - No food allergies/intolerances noted.    CURRENT NUTRITION ORDERS  Diet Order:     Regular     Current Intake/Tolerance:  She reports she ate 100% both breakfast and lunch  "today, plus consumed 2 [employer] donuts brought by family.  She would be receptive to our house supplement in the pm.    Plan CT liver biopsy tomorrow.    NUTRITION FOCUSED PHYSICAL ASSESSMENT FOR DIAGNOSING MALNUTRITION)  Yes                Observed:    Muscle wasting (refer to documentation in Malnutrition section)   Subcutaneous fat loss (refer to documentation in Malnutrition section)    Obtained from Chart/Interdisciplinary Team:  Thin  BLE edema - mild    ANTHROPOMETRICS  Height: 5' 5\"  Weight: 57.1 kg (125 lbs 14.12 oz)  Body mass index is 20.95 kg/m .  Weight Status:  Normal BMI  IBW: 56.8 kg  % IBW: 100%  Weight History:   - Pt suspects she has lost 10 lbs (7%) over the past 3 months.    Wt Readings from Last 10 Encounters:   05/05/22 57.1 kg (125 lb 14.1 oz)   02/07/18 63.8 kg (140 lb 9.6 oz)   02/04/18 64.8 kg (142 lb 12.8 oz)   01/30/18 62.6 kg (138 lb)   11/04/15 67.1 kg (148 lb)   11/03/14 68.5 kg (151 lb)   10/26/12 63.5 kg (140 lb)   05/24/12 62.6 kg (138 lb)     LABS  Labs reviewed    MEDICATIONS  Medications reviewed    ASSESSED NUTRITION NEEDS PER APPROVED PRACTICE GUIDELINES:    Dosing Weight: 57.1 kg    Estimated Energy Needs: 8082-5068 kcals (30-35 Kcal/Kg)  Justification: repletion  Estimated Protein Needs: 69-86 grams protein (1.2-1.5 g pro/Kg)  Justification: Repletion and hypercatabolism with acute illness  Estimated Fluid Needs: 1383-8572 mL (1 mL/Kcal)  Justification: maintenance    MALNUTRITION:  % Weight Loss:  Up to 7.5% in 3 months (moderate malnutrition)  % Intake:  <75% for >/= 3 months (moderate malnutrition)  Subcutaneous Fat Loss:  Orbital region mild-moderate depletion and Upper arm region mild-moderate depletion  Muscle Loss:  Temporal region mild depletion, Clavicle bone region moderate depletion, Acromion bone region moderate depletion and Dorsal hand region mild depletion  Fluid Retention:  Mild     Malnutrition Diagnosis: Moderate malnutrition  In Context of:  Acute illness or " injury    NUTRITION DIAGNOSIS:  Inadequate oral intake related to decreased appetite and likely increased metabolism as evidenced by 7% weight loss over past 3 months with loss of subcutaneous fat and muscle mass.    NUTRITION INTERVENTIONS  Recommendations / Nutrition Prescription  Chocolate Ensure Enlive at 2 pm    Implementation  Nutrition education: Provided education on the use of supplements to optimize intake.  Medical Food Supplement - Ordered above in Epic    Nutrition Goals  Pt will consistently consume % meals and supplement in 3-5 days.    MONITORING AND EVALUATION:  Progress towards goals will be monitored and evaluated per protocol and Practice Guidelines    Vanessa Sung, AMADO, LD, CNSC

## 2022-05-05 NOTE — PLAN OF CARE
Goal Outcome Evaluation:      Date: May 5, 2022  Shift: 0700-1930  Name: Gely Tam  Age: 88 year old  YOB: 1934    Reason for Admission: Abnormal CT lung screening [R91.8]  Atrial fibrillation with RVR (H) [I48.91]     Cognitive/Mentation: A&Ox4  Neuros/CMS: Intact    VS: Stable on RA  Cardiac: WNL  GI: +BS, -Flatus, No BM  : Voiding in BR  Pulmonary: LS dim  Pain: Denies     Drains: PIV SL  Skin: Scattered bruising  Activity: Ind    Diet: Reg     Therapies recs: PT/OT  Discharge: pending     Shift summary: Pt will have liver biopsy tomorrow at 1pm. Then will discharge home.

## 2022-05-05 NOTE — CARE PLAN
PT consult received. Chart reviewed. Met with pt at bedside. Discussed role of PT. Pt denied any concerns with her mobility. RN reports pt IND in the room. Per chart, pt refusing brace that was recommended by neurosurgery. Briefly discussed recs for spinal precautions with log rolling. Pt verbalized understanding. No charge. Will sign off as pt declining PT needs. Updated RN.

## 2022-05-05 NOTE — PLAN OF CARE
Pt arrived from ED 02:00    A&O x4. VSS on room air. LS: diminished. Bowel sounds: audible. Pt denies pain. Activity: bedrest. CMS/Neuros: intact. Tele: SR with PAC. Pt. no longer IMC status.

## 2022-05-06 ENCOUNTER — APPOINTMENT (OUTPATIENT)
Dept: CT IMAGING | Facility: CLINIC | Age: 87
DRG: 309 | End: 2022-05-06
Attending: INTERNAL MEDICINE
Payer: MEDICARE

## 2022-05-06 ENCOUNTER — TELEPHONE (OUTPATIENT)
Dept: NEUROSURGERY | Facility: CLINIC | Age: 87
End: 2022-05-06
Payer: MEDICARE

## 2022-05-06 DIAGNOSIS — C79.51 METASTASIS TO SPINAL COLUMN (H): Primary | ICD-10-CM

## 2022-05-06 LAB
INR PPP: 1.06 (ref 0.85–1.15)
MAGNESIUM SERPL-MCNC: 1.9 MG/DL (ref 1.6–2.3)
PLATELET # BLD AUTO: 306 10E3/UL (ref 150–450)
POTASSIUM BLD-SCNC: 3.6 MMOL/L (ref 3.4–5.3)

## 2022-05-06 PROCEDURE — 93010 ELECTROCARDIOGRAM REPORT: CPT | Performed by: INTERNAL MEDICINE

## 2022-05-06 PROCEDURE — 83735 ASSAY OF MAGNESIUM: CPT | Performed by: INTERNAL MEDICINE

## 2022-05-06 PROCEDURE — 0FD03ZX EXTRACTION OF LIVER, PERCUTANEOUS APPROACH, DIAGNOSTIC: ICD-10-PCS | Performed by: RADIOLOGY

## 2022-05-06 PROCEDURE — 99152 MOD SED SAME PHYS/QHP 5/>YRS: CPT

## 2022-05-06 PROCEDURE — 36415 COLL VENOUS BLD VENIPUNCTURE: CPT | Performed by: RADIOLOGY

## 2022-05-06 PROCEDURE — 85049 AUTOMATED PLATELET COUNT: CPT | Performed by: RADIOLOGY

## 2022-05-06 PROCEDURE — 99232 SBSQ HOSP IP/OBS MODERATE 35: CPT | Mod: FS | Performed by: INTERNAL MEDICINE

## 2022-05-06 PROCEDURE — 250N000011 HC RX IP 250 OP 636: Performed by: RADIOLOGY

## 2022-05-06 PROCEDURE — 999N000154 HC STATISTIC RADIOLOGY XRAY, US, CT, MAR, NM

## 2022-05-06 PROCEDURE — 88307 TISSUE EXAM BY PATHOLOGIST: CPT | Mod: TC | Performed by: INTERNAL MEDICINE

## 2022-05-06 PROCEDURE — 88342 IMHCHEM/IMCYTCHM 1ST ANTB: CPT | Mod: TC | Performed by: INTERNAL MEDICINE

## 2022-05-06 PROCEDURE — 93005 ELECTROCARDIOGRAM TRACING: CPT

## 2022-05-06 PROCEDURE — 250N000013 HC RX MED GY IP 250 OP 250 PS 637: Performed by: NURSE PRACTITIONER

## 2022-05-06 PROCEDURE — 85610 PROTHROMBIN TIME: CPT | Performed by: RADIOLOGY

## 2022-05-06 PROCEDURE — 250N000013 HC RX MED GY IP 250 OP 250 PS 637: Performed by: INTERNAL MEDICINE

## 2022-05-06 PROCEDURE — 47000 NEEDLE BIOPSY OF LIVER PERQ: CPT

## 2022-05-06 PROCEDURE — 82565 ASSAY OF CREATININE: CPT | Performed by: INTERNAL MEDICINE

## 2022-05-06 PROCEDURE — 36415 COLL VENOUS BLD VENIPUNCTURE: CPT | Performed by: INTERNAL MEDICINE

## 2022-05-06 PROCEDURE — 250N000009 HC RX 250: Performed by: RADIOLOGY

## 2022-05-06 PROCEDURE — 84132 ASSAY OF SERUM POTASSIUM: CPT | Performed by: INTERNAL MEDICINE

## 2022-05-06 PROCEDURE — 120N000013 HC R&B IMCU

## 2022-05-06 RX ORDER — IBUPROFEN 400 MG/1
400 TABLET, FILM COATED ORAL EVERY 6 HOURS PRN
Status: DISCONTINUED | OUTPATIENT
Start: 2022-05-06 | End: 2022-05-07 | Stop reason: HOSPADM

## 2022-05-06 RX ORDER — NALOXONE HYDROCHLORIDE 0.4 MG/ML
0.2 INJECTION, SOLUTION INTRAMUSCULAR; INTRAVENOUS; SUBCUTANEOUS
Status: DISCONTINUED | OUTPATIENT
Start: 2022-05-06 | End: 2022-05-06

## 2022-05-06 RX ORDER — FLUMAZENIL 0.1 MG/ML
0.2 INJECTION, SOLUTION INTRAVENOUS
Status: DISCONTINUED | OUTPATIENT
Start: 2022-05-06 | End: 2022-05-06

## 2022-05-06 RX ORDER — NALOXONE HYDROCHLORIDE 0.4 MG/ML
0.4 INJECTION, SOLUTION INTRAMUSCULAR; INTRAVENOUS; SUBCUTANEOUS
Status: DISCONTINUED | OUTPATIENT
Start: 2022-05-06 | End: 2022-05-06

## 2022-05-06 RX ORDER — DIGOXIN 125 MCG
125 TABLET ORAL DAILY
Qty: 30 TABLET | Refills: 0 | Status: SHIPPED | OUTPATIENT
Start: 2022-05-07 | End: 2022-05-17

## 2022-05-06 RX ORDER — ACETAMINOPHEN 325 MG/1
650 TABLET ORAL EVERY 4 HOURS PRN
Status: DISCONTINUED | OUTPATIENT
Start: 2022-05-06 | End: 2022-05-06

## 2022-05-06 RX ORDER — MAGNESIUM OXIDE 400 MG/1
400 TABLET ORAL 2 TIMES DAILY
Status: DISCONTINUED | OUTPATIENT
Start: 2022-05-06 | End: 2022-05-07 | Stop reason: HOSPADM

## 2022-05-06 RX ORDER — FENTANYL CITRATE 50 UG/ML
25-50 INJECTION, SOLUTION INTRAMUSCULAR; INTRAVENOUS EVERY 5 MIN PRN
Status: DISCONTINUED | OUTPATIENT
Start: 2022-05-06 | End: 2022-05-06

## 2022-05-06 RX ORDER — LIDOCAINE 40 MG/G
CREAM TOPICAL
Status: DISCONTINUED | OUTPATIENT
Start: 2022-05-06 | End: 2022-05-07 | Stop reason: HOSPADM

## 2022-05-06 RX ADMIN — MIDAZOLAM HYDROCHLORIDE 0.5 MG: 1 INJECTION, SOLUTION INTRAMUSCULAR; INTRAVENOUS at 15:04

## 2022-05-06 RX ADMIN — METOPROLOL SUCCINATE 12.5 MG: 25 TABLET, EXTENDED RELEASE ORAL at 12:38

## 2022-05-06 RX ADMIN — Medication 400 MG: at 20:10

## 2022-05-06 RX ADMIN — Medication 400 MG: at 08:31

## 2022-05-06 RX ADMIN — LIDOCAINE HYDROCHLORIDE 9 ML: 10 INJECTION, SOLUTION EPIDURAL; INFILTRATION; INTRACAUDAL; PERINEURAL at 15:12

## 2022-05-06 RX ADMIN — DIGOXIN 125 MCG: 125 TABLET ORAL at 08:31

## 2022-05-06 RX ADMIN — FENTANYL CITRATE 25 MCG: 50 INJECTION, SOLUTION INTRAMUSCULAR; INTRAVENOUS at 15:04

## 2022-05-06 ASSESSMENT — ACTIVITIES OF DAILY LIVING (ADL)
ADLS_ACUITY_SCORE: 13
ADLS_ACUITY_SCORE: 13
ADLS_ACUITY_SCORE: 15
ADLS_ACUITY_SCORE: 13
ADLS_ACUITY_SCORE: 15
ADLS_ACUITY_SCORE: 13
ADLS_ACUITY_SCORE: 15
ADLS_ACUITY_SCORE: 13

## 2022-05-06 NOTE — PLAN OF CARE
AxOx4. Not on tele. NPO @ 0900.    VSS on RA. Independent in room. Tolerating regular diet. . Continent, up to restroom.    PIV x1 SL.      Plan to discharge after biopsy on 5/6.    Cardiology, Heme/Onc, Neurosurgery following.  Pt will follow-up as outpatient.

## 2022-05-06 NOTE — PROGRESS NOTES
[facility] Progress Note          Assessment and Plan:    Left upper lobe lung mass    -30 pack year h/o smoking but quit smoking 26 years ago    -CT chest 5/4 showed 4.5cms DIANNA lung mass, R lung nodules, R hilar/subcarinal adenopathy, R lobe liver lesion and T11, L2 and left 4th rib bone lesions     -CT abd/pelvis 5/4 showed 4.6 cms segment 8 liver mass, T10 and L2 bone lesions    -extensive digital clubbing bilateral fingers/toes    -findings suspicious for lung cancer metastatic to contralateral lung, liver and bone metastasis    -CT/USG guided biopsy of liver mass for definitive tissue diagnosis and staging today     -denied headaches, gait or vision disturbances at this time     -if biopsy consistent with lung cancer needs MRI brain w/wo contrast .  We will arrange this on follow up     -follow up in clinic on 5/13/2022 at 11:25am     Bone metastasis  Vertebral fractures/metastasis     -MRI thoracic/lumbar spine 5/4 show findings concerning for T10, T11, L2    -retropulsion of compression fracture at T11 causing moderate effacement of spinal cord with mild spinal canal stenosis.  No cord compression    -noted neurosurgery recommendations    -TLSO brace recommended but declining    -pain control    -outpatient [facility]      New onset atrial fibrillation with RVR    -now reverted to sinus rhythm    -echocardiogram 5/5/2022 showed LVEF 60% with no structural heart disease    -anticoagulation plan per cardiology       DNR    Okay to discharge home following liver biopsy.  Follow up in Oncology clinic on 5/13/2022 at  11:25am    She was comfortable with the plan.    Siva Florence MD                 Interval History:   Denied being in pain.  Denied headaches, vision changes.                Review of Systems:   As per subjective, otherwise 5 systems reviewed and negative.           Physical Exam:   Blood pressure 108/81, pulse 76, temperature 97.7  F (36.5  C), temperature source Oral, resp. rate 16,  "height 1.651 m (5' 5\"), weight 57.1 kg (125 lb 14.1 oz), SpO2 96 %.      Vital Sign Ranges  Temperature Temp  Av.9  F (36.6  C)  Min: 97.6  F (36.4  C)  Max: 98.1  F (36.7  C)   Blood pressure Systolic (24hrs), Av , Min:92 , Max:157        Diastolic (24hrs), Av, Min:45, Max:144      Pulse Pulse  Av.2  Min: 74  Max: 83   Respirations Resp  Av  Min: 16  Max: 16   Pulse oximetry SpO2  Av.2 %  Min: 95 %  Max: 97 %         Intake/Output Summary (Last 24 hours) at 2022  Last data filed at 2022  Gross per 24 hour   Intake 483 ml   Output --   Net 483 ml       Constitutional:   No acute distress.   Skin:   No rashes, petechiae, or ecchymoses.   HEENT:   No conjunctival pallor.  Digital clubbing fingers/toes    Neck:   Supple.   Lungs:   Clear to auscultation bilaterally.   Cardiovascular:   Regular rhythm   Abdomen:   Soft, nontender, nondistended with no palpable hepatosplenomegaly.   Extremities:   No leg edema    Neurological:   No focal motor or sensory deficits.            Medications:     No current outpatient medications on file.                Data:     Results for orders placed or performed during the hospital encounter of 22 (from the past 24 hour(s))   Echocardiogram Complete   Result Value Ref Range    LVEF  60%     Narrative    [id number]  [id number]  [id number]  [id number]^ADIN^DOMINICK^GENNY     [facility]  Echocardiography Laboratory  [address] [place], [place] [address]     Name: MARVA JONES  MRN: [id number]  : 1934  Study Date: 2022 10:35 AM  Age: 88 yrs  Gender: Female  Patient Location: [facility]  Reason For Study: Atrial Fibrillation  Ordering Physician: DOMINICK OKALEY  Referring Physician: Vel Calvert  Performed By: Vaibhav Arroyo     BSA: 1.6 m2  Height: 65 in  Weight: 125 lb  HR: 76  BP: 97/56 mmHg  ______________________________________________________________________________  Procedure  Complete Portable " Echo Adult.  ______________________________________________________________________________  Interpretation Summary     1. The left ventricle is normal in structure, function and size. The visual  ejection fraction is estimated at 60%.  2. The right ventricle is normal in structure, function and size.  3. Mild (35-45mmHg) pulmonary hypertension is present. The right ventricular  systolic pressure is approximated at 41mmHg plus the right atrial pressure.     No previous echo for comparison.  ______________________________________________________________________________  Left Ventricle  The left ventricle is normal in structure, function and size. There is normal  left ventricular wall thickness. The visual ejection fraction is estimated at  60%. Left ventricular diastolic function is normal. Normal left ventricular  wall motion.     Right Ventricle  The right ventricle is normal in structure, function and size.     Atria  The left atrium is mildly dilated. The right atrium is moderately dilated.  There is no atrial shunt seen.     Mitral Valve  There is trace mitral regurgitation.     Tricuspid Valve  There is mild (1+) tricuspid regurgitation. Mild (35-45mmHg) pulmonary  hypertension is present. The right ventricular systolic pressure is  approximated at 41mmHg plus the right atrial pressure.     Aortic Valve  The aortic valve is normal in structure and function.     Pulmonic Valve  The pulmonic valve is normal in structure and function.     Vessels  Normal ascending, transverse (arch), and descending aorta. The inferior vena  cava was normal in size with preserved respiratory variability.     Pericardium  There is no pericardial effusion.     Rhythm  Sinus rhythm was noted.  ______________________________________________________________________________  MMode/2D Measurements & Calculations  IVSd: 0.79 cm     LVIDd: 4.5 cm  LVIDs: 3.3 cm  LVPWd: 0.81 cm  FS: 26.4 %  LV mass(C)d: 115.0 grams  LV mass(C)dI: 71.0  grams/m2  Ao root diam: 3.1 cm  LA dimension: 3.7 cm  asc Aorta Diam: 2.8 cm  LA/Ao: 1.2  LVOT diam: 2.0 cm  LVOT area: 3.3 cm2  LA Volume (BP): 61.0 ml  LA Volume Index (BP): 37.7 ml/m2  RWT: 0.36     Doppler Measurements & Calculations  MV E max jojo: 126.0 cm/sec  MV dec slope: 795.9 cm/sec2  MV dec time: 0.16 sec  PA acc time: 0.17 sec  TR max jojo: 320.3 cm/sec  TR max P.0 mmHg  Lateral E/e': 16.8     ______________________________________________________________________________  Report approved by: Marcell Dillon 2022 12:47 PM         Magnesium   Result Value Ref Range    Magnesium 1.9 1.6 - 2.3 mg/dL   Potassium   Result Value Ref Range    Potassium 3.6 3.4 - 5.3 mmol/L

## 2022-05-06 NOTE — TELEPHONE ENCOUNTER
From Beth Romo PA-C   Please schedule 6 week follow up with thoracic and lumbar XR prior   [facility] location    Order placed.    Routed to schedulers.

## 2022-05-06 NOTE — PROGRESS NOTES
"[facility]    Medicine Progress Note - Hospitalist Service    Date of Admission:  5/4/2022    Assessment & Plan          Gely Tam is a 88 year old female with PMH of HTN, HLD, who presents with new atrial fibrillation with RVR, and suspicion for metastatic lung cancer.    New atrial fibrillation with RVR  Patient denies any palpitations, lightheadedness with walking. She reports onset of leg swelling in December 2021 and eventually was scheduled for outpatient echo on 5/4, which was not done due to rapid afib. Etiology of afib likely related to highly suspicious lung lesions concerning for metastatic cancer (see below). BNP is not elevated--not clearly volume overloaded.    Cardiology consult requested.  Per Meenakshi Tan NP, \"Was on diltiazem and heparin drips yesterday, however overnight she converted to SR and both were discontinued. HR now 68-83.\"    Telemetry, I&O, daily weights    Echocardiogram ordered    IVF ordered per ED due to soft Bps    Keep Mg > 2 and K > 4 with replacement protocols (K replacement protocol could not be ordered--retry again tomorrow)    Per Cardiology, \"SBP continues to be in 90-low 100 range. Unchanged with IV fluid boluses yesterday and discontinuation of diltiazem drip. Would avoid adding BB for rate control due to risk of further hypotension.\"    Digoxin added     Right upper lobe mass, osseus metastases suspicious for metastatic lung cancer  Patient reports weight loss since December 2021. Brought to the ED for atrial fibrillation on 5/4 and CXR is notable for a RUL mass. Follow up CT chest shows no PE but a large 4.5cm RUL mass with probable contralateral metastases an bony metastases. On exam patient has significant clubbing of all her fingers. Patient has a 30 pack year history of smoking, quit approx 28 years ago.    HemOnc consult to assist with diagnosis, family requested [facility]    CT abdomen + pelvis with contrast with multiple abnormal " "findings, including .6 cm liver mass consistent with metastasis. Spinal lesions also consistent with metastasis and seen on MRI exam from today. 3 cm simple appearing left adnexal cyst    Discussed with Dr. Florence, plan is ultrasound-guided biopsy of liver mass     Chronic mid to lower back pain: Workup includes lumbar XR in December 2021 which showed some degenerative changes but no lesions or fractures. Patient denies any radicular symptoms. Given patient's weight loss, probable diagnosis of lung cancer above, and metastatic bone lesions seen on CT, will benefit from MRI for metastatic, spinal cord evaluation.    Lumbar/thoracic spine MRI with osseous metastases are seen to nearly completely replace the L2 vertebral body, the posterior aspect of the T12 vertebral body completely replace the T11 vertebral body which demonstrates 70-80% pathologic compression deformity and retropulsion    Neurosurgery consult requested, I appreciate Rashawn Mijares's evaluation and recommendations    They recommended TLSO, orthotist can fit it.  Patient refused to wear it, \"it is too heavy.  There is no you spending money on it if I am just going to put it in a closet and never wear it.\"  We discussed the rationale for TLSO, including promoting fracture stability, avoiding spinal cord compression.  Patient is quite steadfast in her refusal to wear TLSO    Lower extremity edema  HTN, HLD  On Lasix 20mg daily prior to admission. Patient is relatively hypotensive here, BNP is not elevated at 700. Patient has LE edema but otherwise does not have evidence of volume overload.    Okay for Regular diet    Received IV fluids in ED    Hold PTA Lasix    Hold PTA ASA    Hold PTA atorvastatin    Echocardiogram as above    COVID-19 ASYMPTOMATIC testing: Patient does not have any abnormal respiratory symptoms and is considered LOW SUSPICION for COVID.       Diet: Snacks/Supplements Adult: Ensure Enlive; Between Meals  Diet  Combination Diet Regular " "Diet Adult    DVT Prophylaxis: Enoxaparin (Lovenox) SQ  Newberry Catheter: Not present  Central Lines: None  Cardiac Monitoring: ACTIVE order. Indication: Tachyarrhythmias, acute (48 hours)  Code Status: No CPR- Pre-arrest intubation OK      Disposition Plan   Expected Discharge: 05/06/2022     Anticipated discharge location:  Awaiting care coordination huddle  Delays:     Liver biopsy, clinical stability.    Arely Gale MD  Hospitalist Service  [facility]  Securely message with the Vocera Web Console (learn more here)  Text page via [employer] Paging/Directory     Clinically Significant Risk Factors Present on Admission              # Moderate Malnutrition: based on nutrition assessment     ______________________________________________________________________    Interval History   \"Let's get on with it.\"  Patient says she is ready to have liver biopsy, asks if biopsy will be painful.  She denies chest discomfort or palpitations, no GI complaints.  Son is at the bedside, he asks if patient will be discharged with medications for atrial fibrillation.    Data reviewed today: I reviewed all medications, new labs and imaging results over the last 24 hours. I personally reviewed the abdominal CT image(s) showing  4.6 cm liver mass consistent with metastasis. .    Physical Exam   Vital Signs: Temp: 97.7  F (36.5  C) Temp src: Oral BP: 94/57 Pulse: (!) 126   Resp: 14 SpO2: 99 % O2 Device: Nasal cannula Oxygen Delivery: 2 LPM  Weight: 125 lbs 14.12 oz  Constitutional: Awake, alert,  Respiratory: Coarse, upper airway noise  Cardiovascular: irregularly irregular  GI: Normal bowel sounds, soft, non-distended, non-tender  Skin/Integumen: Has bilateral lower extremity edema  Other: Mood is somewhat upset      Data   Recent Labs   Lab 05/06/22  0523 05/05/22  0534 05/05/22  0505 05/04/22  1631 05/04/22  1045   WBC  --  6.9  --  8.3 7.8   HGB  --  11.7  --  12.6 13.6   MCV  --  91  --  90 90   PLT  --  299  " --  335 337   NA  --  138  --   --  138   POTASSIUM 3.6 3.7  --  3.6 3.5   CHLORIDE  --  108  --   --  105   CO2  --  26  --   --  27   BUN  --  9  --   --  12   CR  --  0.55  --  0.61 0.61   ANIONGAP  --  4  --   --  6   ED  --  8.5  --   --  9.1   GLC  --  114* 106*  --  124*   ALBUMIN  --  2.5*  --   --  3.2*   PROTTOTAL  --  5.4*  --   --  6.7*   BILITOTAL  --  0.9  --   --  0.7   ALKPHOS  --  92  --   --  119   ALT  --  12  --   --  15   AST  --  11  --   --  11     No results found for this or any previous visit (from the past 24 hour(s)).  Medications     sodium chloride 0.9%         digoxin  125 mcg Oral Daily     magnesium oxide  400 mg Oral BID     metoprolol succinate ER  12.5 mg Oral Daily     sodium chloride (PF)  3 mL Intracatheter Q8H

## 2022-05-06 NOTE — PROGRESS NOTES
Care Suites Procedure Nursing Note    Patient Information  Name: Gely Tam  Age: 88 year old    Procedure  Procedure: CT guided liver biopsy  Procedure start time: 15:00  Procedure complete time: 15:15  Concerns/abnormal assessment: None  Pt tolerated well. VSS.   Total sedation given 25 mcg Fentanyl and 0.5 mg Versed.   Multiple cores obtained & sent to lab for Quick Path results.   Bandaid applied to site - area CDI.   Pt back to rm 309 per cart & transport. Detailed report called to RN.

## 2022-05-06 NOTE — PROGRESS NOTES
[facility]    Neurosurgery  Daily Note    Assessment & Plan   Gely Tam is a pleasant 88 year old female with history of lung cancer with meets to her contralateral lung, live and bone with spine imaging as stated below. She denies new or worsening back pain, lower extremity pain, paresthesias, new weakness, bowel/bladder changes. Orthotics consulted yesterday and patient refusing brace at this time.     Impression per radiology read - I have personally reviewed the images with the patient.     MR THORACIC SPINE W/O and W CONTRAST - 5/4/2022 7:57 PM     1.  Osseous metastases are seen to nearly completely replace the L2 vertebral body, the posterior aspect of the T12 vertebral body completely replace the T11 vertebral body which demonstrates 70-80% pathologic compression deformity and retropulsion, and   the anterior aspect of the T10 vertebral body. Moderate retropulsion at T11 results in mild effacement of the left anterior aspect of the spinal cord and mild spinal canal stenosis. No other evidence of significant spinal canal or neural foraminal stenosis.   2.  5 cm mass left lung. 1.5 cm mass right lung.     MR LUMBAR SPINE W/O and W CONTRAST - 5/4/2022 7:58 PM     1.  Osseous metastases are seen to nearly completely replace the L2 vertebral body, the posterior aspect of the T12 vertebral body completely replace the T11 vertebral body which demonstrates 70-80% pathologic compression deformity and retropulsion, and   the anterior aspect of the T10 vertebral body.  2.  No high-grade spinal canal or neural foraminal stenosis. Retropulsion of pathologic compression fracture at T11 with moderate effacement of the anterior spinal cord and mild spinal canal stenosis.     Plan:  -Reviewed all risks of not wearing brace with patient. She is understanding of these risks and still refusing brace.    -Reviewed red flag s/s to be aware of and advised to present to ED should these occur  -Recommend  follow up in 6 weeks with thoracic and lumbar XR prior - our office will coordinate this.  - Our team will sign off at this time. Call or page with questions.  -Patient in agreement with plans   -Dr. Chow in agreement with plans   -Call or page with questions    Beth JIMÉNEZ [address] [address]    Tel [phone]  Pager [phone]    Active Problems:    Abnormal CT lung screening    Atrial fibrillation with RVR (H)      Beth Romo PA-C    Interval History   Stable     Physical Exam   Temp: 97.7  F (36.5  C) Temp src: Oral BP: 108/81 Pulse: 76   Resp: 16 SpO2: 96 % O2 Device: None (Room air)    Vitals:    05/04/22 1009 05/05/22 0500   Weight: 135 lb (61.2 kg) 125 lb 14.1 oz (57.1 kg)     Vital Signs with Ranges  Temp:  [97.6  F (36.4  C)-98.1  F (36.7  C)] 97.7  F (36.5  C)  Pulse:  [76-83] 76  Resp:  [16] 16  BP: ()/() 108/81  SpO2:  [95 %-97 %] 96 %  I/O last 3 completed shifts:  In: 483 [P.O.:480; I.V.:3]  Out: -     Awake,  Alert, oriented   5/5 motor strength BUE and BLE   Sensation intact to light touch throughout   Negative clonus   No TTP along cervical, thoracic, lumbar spinous processes or paraspinous muscles       Medications        digoxin  125 mcg Oral Daily     magnesium oxide  400 mg Oral BID     sodium chloride (PF)  3 mL Intracatheter Q8H       Plans discussed with Dr. Chow who was in agreement with plans    Beth JIMÉNEZ [address] [place] [address]    Tel [phone]  Pager [phone]

## 2022-05-06 NOTE — PROGRESS NOTES
[facility]    Cardiology Progress Note    Date of Admission: 5/4/2022  Service Date: 05/06/22    Summary:  Ms. Gely Tam is a very pleasant 88 year old female with a past medical history of hyperlipidemia, hypertension who was admitted on 5/4/2022 for atrial fibrillation and suspicion for metastatic lung cancer. I was asked to see the patient for new onset atrial fibrillation with rapid ventricular rate.    Interval History   Patient was seen in her room with her son Neal present.  [place] [facility] Dr. Florence has consulted and plan is for liver biopsy today and discharge following. Patient has been fitted for TLSO brace and neurosurgery has signed off. Denies chest pain, palpitations, or shortness of breath. Cough and edema improved today. Reports she voided x 5 following IV lasix yesterday.       Assessment & Plan   1. New onset atrial fibrillation with RVR, Paroxysmal: Possibly secondary to metastatic lung cancer. Was on diltiazem and heparin drips 5/4/22 however she converted to SR and both were discontinued.Digoxin started yesterday for rhythm control. Echocardiogram done 5/5/22 EF 60%, mild pulmonary HTN, 1+ TR and normal RV size. No wall motion abnormalities.  This am EKG showed atrial fibrillation with rate 115. Metoprolol succinate 6.25mg started and tele monitoring ordered.     2. Hypertension: SBP continues to be in 90-low 100 range. Unchanged with IV fluid boluses in ER and discontinuation of diltiazem drip. Will add metoprolol succinate 6.25mg and monitor BP closely for hypotension.     3. Lower extremity edema: , home lasix has been on hold considering blood pressure, echocardiogram completed today and awaiting final interpretation. IV fluids given in ER likely causing fluid overload and cough. Improved today with IV lasix x 1 dose yesterday. Will have patient continue her     4. Left upper lobe lung mass/Spinal bony metastasis: Unintentional weight loss and muscle  wasting in neck, chest and arms x 3 months per patient. CT was done which resulted in 4.5 cm mass in left upper lobe, possibly a primary lung malignancy. Indeterminate liver lesions, bony metastatic disease, and contralateral pulmonary metastases were also seen. A CT abdomen/pelvis was done yesterday showing severe compression of T10 with infiltration of bone and retropulsion of bone slightly into central canal. Infultrative lytic process also present within L2 vertebral body consistent with metastatic disease. A 4.6 cm liver mass consistent with metastasis and 3cm simple appearing L adenexal cyst-likely benign. Neurosurgery was consulted and felt patient would be best treated with a TLSO brace x 3 months. She is to follow up in 6 weeks for repeat x-rays. Dr Florence with [facility] consulted and liver biopsy is being done today and brain MRI if biopsy shows consistent with malignancy for staging. Plan is for patient to follow up with oncology on 5/13/22 outpatient.     Plan:   1. Patient is now back in atrial fibrillation, tele ordered. Continue digoxin. Metoprolol succinate 6.25mg started today.  2. Recommend starting low dose eliquis 2.5mg BID after appropriate length of time following biopsy today  3. Liver biopsy today, Oncology to follow. Outpatient appointment on 5/13/22  3.Ok for home dose PO lasix 20mg daily PRN for edema    I spent 10 minutes face-to-face or coordinating care of Gely Tam. Over 50% of our time on the unit was spent counseling the patient and/or coordinating care.    Thank you for the opportunity to participate in this pleasant patient's care.     Meenakshi Tan APRN, CNP   Nurse Practitioner  [facility]      Patient Active Problem List   Diagnosis     HTN (hypertension)     DJD (degenerative joint disease) of knee     Lipid disorder     S/P total hip arthroplasty     Hypotension     Total knee replacement status     Physical deconditioning     Slow transit  constipation     Anemia due to blood loss, acute     Abnormal CT lung screening     Atrial fibrillation with RVR (H)       Physical Exam   Temp: 97.7  F (36.5  C) Temp src: Oral BP: 108/81 Pulse: 76   Resp: 16 SpO2: 96 % O2 Device: None (Room air)    Vitals:    05/04/22 1009 05/05/22 0500   Weight: 61.2 kg (135 lb) 57.1 kg (125 lb 14.1 oz)     Vital Signs with Ranges  Temp:  [97.6  F (36.4  C)-98  F (36.7  C)] 97.7  F (36.5  C)  Pulse:  [76-83] 76  Resp:  [16] 16  BP: ()/(50-81) 108/81  SpO2:  [95 %-97 %] 96 %  I/O last 3 completed shifts:  In: 483 [P.O.:480; I.V.:3]  Out: -     Constitutional:  Appears her stated age, thin, and in no acute distress.  Eyes: Pupils equal, round. Sclerae anicteric.   HEENT: Normocephalic, atraumatic.   Neck: Supple. Carotid pulses full and equal. No carotid bruit. No anterior cervical or supraclavicular lymphadenopathy appreciated. No JVD appreciated.  Respiratory: Breathing non-labored. Lungs clear to auscultation bilaterally. Occasional cough.   Cardiovascular: Regular rate and rhythm, normal S1 and S2. No murmur, rub, or gallop.  GI: Soft, non-distended, non-tender, bowel sounds present in all four quadrants.  Skin: Warm, dry. No rashes, cyanosis, edema, or xanthelasma. Clubbing of all fingers   Musculoskeletal/Extremities: Moves all extremities well and symmetrically. Trace BLE edema L >R.  Neurologic: No gross focal deficits. Alert, awake, and oriented to person, place and time.  Psychiatric: Affect appropriate. Mentation normal.    Medications       digoxin  125 mcg Oral Daily     magnesium oxide  400 mg Oral BID     metoprolol succinate ER  12.5 mg Oral Daily     sodium chloride (PF)  3 mL Intracatheter Q8H       Data     Recent Labs   Lab 05/05/22  0534 05/04/22  1631 05/04/22  1045   WBC 6.9 8.3 7.8   HGB 11.7 12.6 13.6   HCT 35.7 39.1 42.5   MCV 91 90 90    335 337     Recent Labs   Lab 05/06/22  0523 05/05/22  0534 05/05/22  0505 05/04/22  1631 05/04/22  1045    NA  --  138  --   --  138   POTASSIUM 3.6 3.7  --  3.6 3.5   CHLORIDE  --  108  --   --  105   CO2  --  26  --   --  27   ANIONGAP  --  4  --   --  6   GLC  --  114* 106*  --  124*   BUN  --  9  --   --  12   CR  --  0.55  --  0.61 0.61   GFRESTIMATED  --  88  --  86 86   ED  --  8.5  --   --  9.1     [employer]   Lab 05/04/22  1045   [id number]        This note was completed in part using Dragon voice recognition software. Although reviewed after completion, some word and grammatical errors may occur.

## 2022-05-06 NOTE — PLAN OF CARE
A&Ox4. VSS ex soft BP, on RA. Tele: Afib CVR/RVR, gave scheduled metoprolol. Denies pain. R liver biopsy completed, dressing CDI. Tolerates regular diet. Up independent. BERNARDO, +BS. PIV SL. Plan to discharge tomorrow.

## 2022-05-07 VITALS
HEIGHT: 65 IN | BODY MASS INDEX: 20.72 KG/M2 | DIASTOLIC BLOOD PRESSURE: 62 MMHG | TEMPERATURE: 98 F | OXYGEN SATURATION: 98 % | RESPIRATION RATE: 16 BRPM | SYSTOLIC BLOOD PRESSURE: 110 MMHG | HEART RATE: 76 BPM | WEIGHT: 124.34 LBS

## 2022-05-07 LAB
CREAT SERPL-MCNC: 0.54 MG/DL (ref 0.52–1.04)
GFR SERPL CREATININE-BSD FRML MDRD: 88 ML/MIN/1.73M2
PLATELET # BLD AUTO: 307 10E3/UL (ref 150–450)
POTASSIUM BLD-SCNC: 3.9 MMOL/L (ref 3.4–5.3)

## 2022-05-07 PROCEDURE — 36415 COLL VENOUS BLD VENIPUNCTURE: CPT | Performed by: INTERNAL MEDICINE

## 2022-05-07 PROCEDURE — 250N000013 HC RX MED GY IP 250 OP 250 PS 637: Performed by: INTERNAL MEDICINE

## 2022-05-07 PROCEDURE — 84132 ASSAY OF SERUM POTASSIUM: CPT | Performed by: INTERNAL MEDICINE

## 2022-05-07 PROCEDURE — 99239 HOSP IP/OBS DSCHRG MGMT >30: CPT | Performed by: INTERNAL MEDICINE

## 2022-05-07 PROCEDURE — 250N000013 HC RX MED GY IP 250 OP 250 PS 637: Performed by: NURSE PRACTITIONER

## 2022-05-07 PROCEDURE — 85049 AUTOMATED PLATELET COUNT: CPT | Performed by: INTERNAL MEDICINE

## 2022-05-07 RX ORDER — METOPROLOL SUCCINATE 25 MG/1
12.5 TABLET, EXTENDED RELEASE ORAL DAILY
Qty: 15 TABLET | Refills: 0 | Status: SHIPPED | OUTPATIENT
Start: 2022-05-08 | End: 2022-05-17

## 2022-05-07 RX ADMIN — Medication 400 MG: at 08:29

## 2022-05-07 RX ADMIN — DIGOXIN 125 MCG: 125 TABLET ORAL at 08:29

## 2022-05-07 RX ADMIN — METOPROLOL SUCCINATE 12.5 MG: 25 TABLET, EXTENDED RELEASE ORAL at 08:29

## 2022-05-07 ASSESSMENT — ACTIVITIES OF DAILY LIVING (ADL)
ADLS_ACUITY_SCORE: 13

## 2022-05-07 NOTE — PLAN OF CARE
Discharge Note    Patient discharged to home, family is transport.  PIV discontinued.   Prescriptions sent with patient.   Belongings reviewed and sent with patient.  Patient verbalizes understanding of discharge instructions. AVS given to patient.

## 2022-05-07 NOTE — PROGRESS NOTES
[facility] Progress Note          Assessment and Plan:   Ms. Gely Tam is an 88 year rold woman who was admitted on 5/4/2022 with new onset atrial fibrillation and subsequent findings of a lung mass and probable metastatic disease    1. Probable lung cancer presenting with a DIANNA lung mass and liver and bone metastases  - she had liver biopsy yesterday with results pending.  - discussed possible findings on liver biopsy and reviewed the distinction between small cell and nonsmall cell lung cancer. I emphasized the importance of ancillary testing for actionable mutations and for PDL-1  - we discussed general outlines of treatment and I explained that in the setting of metastatic disease, there is no clear indication for surgery  - she anticipates discharge later today with follow up in the outpatient clinic with Dr. Florence on 5/13    2. Bone metastasis  Vertebral fractures/metastasis     -MRI thoracic/lumbar spine 5/4 show findings concerning for T10, T11, L2    -retropulsion of compression fracture at T11 causing moderate effacement of spinal cord with mild spinal canal stenosis.  No cord compression    -noted neurosurgery recommendations    -TLSO brace recommended but declining    -pain control    -outpatient [facility]      3. New onset atrial fibrillation with RVR    -now reverted to sinus rhythm    -echocardiogram 5/5/2022 showed LVEF 60% with no structural heart disease    -anticoagulation plan per cardiology              Interval History:   Patient reports no new symptoms overnight. No complaints of nausea or dyspnea. She relates ongoing low back pain.              Medications:       digoxin  125 mcg Oral Daily     magnesium oxide  400 mg Oral BID     metoprolol succinate ER  12.5 mg Oral Daily     sodium chloride (PF)  3 mL Intracatheter Q8H     acetaminophen **OR** acetaminophen, ibuprofen, lidocaine 4%, lidocaine 4%, lidocaine (buffered or not buffered), lidocaine (buffered or not buffered),  "melatonin, nitroGLYcerin, ondansetron **OR** ondansetron, polyethylene glycol, sodium chloride (PF)               Physical Exam:   Blood pressure 110/62, pulse 76, temperature 98  F (36.7  C), temperature source Oral, resp. rate 16, height 1.651 m (5' 5\"), weight 56.4 kg (124 lb 5.4 oz), SpO2 98 %.  Wt Readings from Last 4 Encounters:   22 56.4 kg (124 lb 5.4 oz)   18 63.8 kg (140 lb 9.6 oz)   18 64.8 kg (142 lb 12.8 oz)   18 62.6 kg (138 lb)         Vital Sign Ranges  Temperature Temp  Av.8  F (36.6  C)  Min: 97.4  F (36.3  C)  Max: 98.1  F (36.7  C)   Blood pressure Systolic (24hrs), Av , Min:94 , Max:117        Diastolic (24hrs), Av, Min:43, Max:67      Pulse Pulse  Av.3  Min: 75  Max: 126   Respirations Resp  Av.7  Min: 12  Max: 16   Pulse oximetry SpO2  Av.9 %  Min: 96 %  Max: 100 %         Intake/Output Summary (Last 24 hours) at 2022 0915  Last data filed at 2022  Gross per 24 hour   Intake 480 ml   Output --   Net 480 ml       Constitutional: Awake, alert, cooperative, no apparent distress   Lungs: Clear to auscultation bilaterally, no crackles or wheezing   Cardiovascular: Regular rate and rhythm, normal S1 and S2, and no murmur noted   Abdomen: Normal bowel sounds, soft, non-distended, non-tender   Skin: No rashes, no cyanosis, no edema   Other: Biopsy site in the lower right chest nontender          Data:   Laboratory:  CMP  Recent Labs   Lab 22  0528 22  0523 22  0534 22  0505 22  1631 22  1045   NA  --   --  138  --   --  138   POTASSIUM 3.9 3.6 3.7  --  3.6 3.5   CHLORIDE  --   --  108  --   --  105   CO2  --   --  26  --   --  27   ANIONGAP  --   --  4  --   --  6   GLC  --   --  114* 106*  --  124*   BUN  --   --  9  --   --  12   CR  --  0.54 0.55  --  0.61 0.61   GFRESTIMATED  --  88 88  --  86 86   ED  --   --  8.5  --   --  9.1   MAG  --  1.9  --   --   --  1.9   PROTTOTAL  --   --  5.4*  --   " --  6.7*   ALBUMIN  --   --  2.5*  --   --  3.2*   BILITOTAL  --   --  0.9  --   --  0.7   ALKPHOS  --   --  92  --   --  119   AST  --   --  11  --   --  11   ALT  --   --  12  --   --  15     CBC  Recent Labs   Lab 22  0528 22  1759 22  0534 22  1631 22  1045   WBC  --   --  6.9 8.3 7.8   RBC  --   --  3.91 4.33 4.73   HGB  --   --  11.7 12.6 13.6   HCT  --   --  35.7 39.1 42.5   MCV  --   --  91 90 90   MCH  --   --  29.9 29.1 28.8   MCHC  --   --  32.8 32.2 32.0   RDW  --   --  15.5* 15.1* 14.9    306 299 335 337     INR  Recent Labs   Lab 22  1759   INR 1.06       Imaging data:  Recent Results (from the past 48 hour(s))   Echocardiogram Complete   Result Value    LVEF  60%    Narrative    [id number]  [id number]  [id number]  [id number]^ADIN^DOMINICK^GENNY     [facility]  Echocardiography Laboratory  [address] [place], [place] [id number]     Name: MARVA JONES  MRN: [id number]  : 1934  Study Date: 2022 10:35 AM  Age: 88 yrs  Gender: Female  Patient Location: [facility]  Reason For Study: Atrial Fibrillation  Ordering Physician: DOMINICK OAKLEY  Referring Physician: Vel Calvert  Performed By: Vaibhav Arroyo     BSA: 1.6 m2  Height: 65 in  Weight: 125 lb  HR: 76  BP: 97/56 mmHg  ______________________________________________________________________________  Procedure  Complete Portable Echo Adult.  ______________________________________________________________________________  Interpretation Summary     1. The left ventricle is normal in structure, function and size. The visual  ejection fraction is estimated at 60%.  2. The right ventricle is normal in structure, function and size.  3. Mild (35-45mmHg) pulmonary hypertension is present. The right ventricular  systolic pressure is approximated at 41mmHg plus the right atrial pressure.     No previous echo for  comparison.  ______________________________________________________________________________  Left Ventricle  The left ventricle is normal in structure, function and size. There is normal  left ventricular wall thickness. The visual ejection fraction is estimated at  60%. Left ventricular diastolic function is normal. Normal left ventricular  wall motion.     Right Ventricle  The right ventricle is normal in structure, function and size.     Atria  The left atrium is mildly dilated. The right atrium is moderately dilated.  There is no atrial shunt seen.     Mitral Valve  There is trace mitral regurgitation.     Tricuspid Valve  There is mild (1+) tricuspid regurgitation. Mild (35-45mmHg) pulmonary  hypertension is present. The right ventricular systolic pressure is  approximated at 41mmHg plus the right atrial pressure.     Aortic Valve  The aortic valve is normal in structure and function.     Pulmonic Valve  The pulmonic valve is normal in structure and function.     Vessels  Normal ascending, transverse (arch), and descending aorta. The inferior vena  cava was normal in size with preserved respiratory variability.     Pericardium  There is no pericardial effusion.     Rhythm  Sinus rhythm was noted.  ______________________________________________________________________________  MMode/2D Measurements & Calculations  IVSd: 0.79 cm     LVIDd: 4.5 cm  LVIDs: 3.3 cm  LVPWd: 0.81 cm  FS: 26.4 %  LV mass(C)d: 115.0 grams  LV mass(C)dI: 71.0 grams/m2  Ao root diam: 3.1 cm  LA dimension: 3.7 cm  asc Aorta Diam: 2.8 cm  LA/Ao: 1.2  LVOT diam: 2.0 cm  LVOT area: 3.3 cm2  LA Volume (BP): 61.0 ml  LA Volume Index (BP): 37.7 ml/m2  RWT: 0.36     Doppler Measurements & Calculations  MV E max jojo: 126.0 cm/sec  MV dec slope: 795.9 cm/sec2  MV dec time: 0.16 sec  PA acc time: 0.17 sec  TR max jojo: 320.3 cm/sec  TR max P.0 mmHg  Lateral E/e': 16.8      ______________________________________________________________________________  Report approved by: Marcell Dillon 05/05/2022 12:47 PM         CT Liver Biopsy    Narrative    CT BIOPSY LIVER PERCUTANEOUS  5/6/2022 3:03 PM    HISTORY: 30-pack-year smoking history with left upper lobe lung mass  and possible metastatic disease to liver and bones. Clinical suspicion  for metastatic lung cancer. CT-guided biopsy of liver mass for  definitive histologic diagnosis and staging.    COMPARISON: None.    TECHNIQUE: Volumetric helical acquisition of CT images without  contrast. Radiation dose for this scan was reduced using automated  exposure control, adjustment of the mA and/or kV according to patient  size, or iterative reconstruction technique.    MEDICATIONS:  9ml Lidocaine 1% locally, CHASE Nair and Milli Fuller,  sedation time 12 minutes, .5ng Versed, 25mcg Fentanyl.    FINDINGS: After written and oral informed consent was obtained and a  pause for the cause procedure verifying the correct procedure and the  correct patient, the area was prepped and draped in the usual sterile  fashion. The overlying soft tissues were anesthetized with 9 mL of 1%  subcutaneous lidocaine. Utilizing CT guidance 6 passes were made into  the lesion in the posterior right lobe of the liver with an 18 gauge  biopsy device and the tissue was submitted to pathology. There were no  immediate complications.     CONSCIOUS SEDATION NOTE: After obtaining consent for sedation,  conscious sedation was induced using IV Versed and IV fentanyl.  Patient was monitored by nurse under my direct supervision throughout  the exam. There were no complications of the sedation. 15 minutes  face-to-face time.      Impression    IMPRESSION:   1. Technically successful liver lesion biopsy.   2. Conscious sedation.         Alexx Vazquez MD

## 2022-05-07 NOTE — DISCHARGE SUMMARY
"[facility]  Hospitalist Discharge Summary      Date of Admission:  5/4/2022  Date of Discharge:  5/7/2022  Discharging Provider: Arely Gale MD  Discharge Service: Hospitalist Service    Discharge Diagnoses   New atrial fibrillation with RVR  Right upper lobe mass, osseus metastases suspicious for metastatic lung cancer  Chronic mid to lower back pain  Lower extremity edema  Hypertension  Hyperlipidemia      Follow-ups Needed After Discharge   Follow-up Appointments     Follow-up and recommended labs and tests       Follow up with Oncology (Dr. Florence) and Cardiology (Dr. Guajardo) as   instructed by them.  Their offices will contact you with appointments.             Unresulted Labs Ordered in the Past 30 Days of this Admission     Date and Time Order Name Status Description    5/6/2022  3:06 PM Surgical Pathology Exam In process       These results will be followed up by Oncology, Dr. Florence, et al    Discharge Disposition   Discharged to home  Condition at discharge: Stable    Hospital Course         Gely Tam is a 88 year old female with PMH of HTN, HLD, who presents with new atrial fibrillation with RVR, and CT finding of right upper lobe mass suspicious for metastatic lung cancer.    New atrial fibrillation with RVR  Patient denies any palpitations, lightheadedness with walking. She reports onset of leg swelling in December 2021 and eventually was scheduled for outpatient echo on 5/4, ordered because of new lower extremity edema. Etiology of afib likely related to highly suspicious lung lesions concerning for metastatic cancer (see below). BNP is not elevated--not clearly volume overloaded.    Cardiology consult requested.  Per Meenakshi Tan NP, \"Was on diltiazem and heparin drips yesterday, however overnight she converted to SR and both were discontinued. HR now 68-83.\"    Telemetry, I&O, daily weights    Echocardiogram shows normal LV function with mild pulmonary hypertension, " "right systolic pressure approximately 41 mmHg plus right atrial pressure    IVF ordered per ED due to soft Bps    Keep Mg > 2 and K > 4 with replacement protocols (K replacement protocol could not be ordered--retry again tomorrow)    Cardiology (Dr. Guajardo) ordered digoxin 0.125 g daily and metoprolol 12.5 mg daily    Also per Dr. Guajardo, \"Anticoagulation with Eliquis 2.5 mg po bid is recommended after an appropriate amount of time after biopsy to minimize bleeding risk.\"  He indicated they would further discuss anticoagulants at office visit.    Right upper lobe mass, osseus metastases suspicious for metastatic lung cancer  Patient reports weight loss since December 2021. Brought to the ED for atrial fibrillation on 5/4 and CXR is notable for a RUL mass. Follow up CT chest shows no PE but a large 4.5cm RUL mass with probable contralateral metastases an bony metastases. On exam patient has significant clubbing of all her fingers. Patient has a 30 pack year history of smoking, quit approx 28 years ago.    HemOnc consult to assist with diagnosis, family requested [facility]    CT abdomen + pelvis with contrast with multiple abnormal findings, including .6 cm liver mass consistent with metastasis. Spinal lesions also consistent with metastasis and seen on MRI exam from today. 3 cm simple appearing left adnexal cyst    Discussed with Dr. Florence, patient had ultrasound-guided biopsy of liver mass 5/6, pathology results are pending    also per Dr. Florence, patient does not need to remain in hospital following liver biopsy.  She could follow-up with him in the office for results and further plan.    Chronic mid to lower back pain: Workup includes lumbar XR in December 2021 which showed some degenerative changes but no lesions or fractures. Patient denies any radicular symptoms. Given patient's weight loss, probable diagnosis of lung cancer above, and metastatic bone lesions seen on CT, will benefit from MRI for metastatic, spinal " "cord evaluation.    Lumbar/thoracic spine MRI with osseous metastases are seen to nearly completely replace the L2 vertebral body, the posterior aspect of the T12 vertebral body completely replace the T11 vertebral body which demonstrates 70-80% pathologic compression deformity and retropulsion    Neurosurgery consult requested, I appreciate Rashawn Mijares's evaluation and recommendations    They recommended TLSO.  Patient refused to wear it, \"it is too heavy.  There is no use spending money on it if I am just going to put it in a closet and never wear it.\"  We discussed the rationale for TLSO, including promoting fracture stability, avoiding spinal cord compression.  Patient is quite steadfast in her refusal to wear TLSO    Lower extremity edema  HTN, HLD  On Lasix 20mg daily prior to admission. Patient is relatively hypotensive here, BNP is not elevated at 700. Patient has LE edema but otherwise does not have evidence of volume overload.    Okay for Regular diet    Received IV fluids in ED    Hold PTA Lasix    Hold PTA ASA    Echocardiogram as above    COVID-19 ASYMPTOMATIC testing: Patient does not have any abnormal respiratory symptoms and is considered LOW SUSPICION for COVID.      Consultations This Hospital Stay   CARDIOLOGY IP CONSULT  HEMATOLOGY & ONCOLOGY IP CONSULT  PHARMACY IP CONSULT  PHARMACY IP CONSULT  PHYSICAL THERAPY ADULT IP CONSULT  OCCUPATIONAL THERAPY ADULT IP CONSULT  NEUROSURGERY IP CONSULT  ORTHOSIS BRACE IP CONSULT    Code Status   No CPR- Pre-arrest intubation OK    Time Spent on this Encounter   I, Arely Gale MD, personally saw the patient today and spent greater than 30 minutes discharging this patient.       Arely Gale MD  [place] [address] [place] [address]  Phone: [phone]  ______________________________________________________________________    Physical Exam   Vital Signs: Temp: 98  F (36.7  C) Temp src: Oral BP: 110/62 " "Pulse: 76   Resp: 16 SpO2: 98 % O2 Device: None (Room air) Oxygen Delivery: 2 LPM  Weight: 124 lbs 5.43 oz         Primary Care Physician   Vel Calvert    Discharge Orders      Reason for your hospital stay    You had an irregular heart beat (\"atrial fib\") and masses in the chest, liver and spine were identified.     Follow-up and recommended labs and tests     Follow up with Oncology (Dr. Florence) and Cardiology (Dr. Guajardo) as instructed by them.  Their offices will contact you with appointments.     Activity    Your activity upon discharge: activity as tolerated.     Diet    Follow this diet upon discharge: Orders Placed This Encounter      Snacks/Supplements Adult: Ensure Enlive; Between Meals     Regular       Significant Results and Procedures   Most Recent 3 CBC's:Recent Labs   Lab Test 05/07/22  0528 05/06/22  1759 05/05/22  0534 05/04/22  1631 05/04/22  1045   WBC  --   --  6.9 8.3 7.8   HGB  --   --  11.7 12.6 13.6   MCV  --   --  91 90 90    306 299 335 337     Most Recent 3 BMP's:Recent Labs   Lab Test 05/07/22  0528 05/06/22  0523 05/05/22  0534 05/05/22  0505 05/04/22  1631 05/04/22  1045 05/04/22  1045 02/07/18  0000   NA  --   --  138  --   --   --  138 137   POTASSIUM 3.9 3.6 3.7  --  3.6   < > 3.5 4.3   CHLORIDE  --   --  108  --   --   --  105 101   CO2  --   --  26  --   --   --  27 30   BUN  --   --  9  --   --   --  12 13   CR  --  0.54 0.55  --  0.61   < > 0.61 0.54   ANIONGAP  --   --  4  --   --   --  6 6   ED  --   --  8.5  --   --   --  9.1 9.1   GLC  --   --  114* 106*  --   --  124* 117*    < > = values in this interval not displayed.   ,   Results for orders placed or performed during the hospital encounter of 05/04/22   XR Chest 2 Views    Narrative    CHEST TWO VIEWS May 4, 2022 10:39 AM     HISTORY: Palpitations.    COMPARISON: None.       Impression    IMPRESSION: Suspected right upper lobe mass versus less likely  masslike consolidation. Nodules in the right " upper lobe measuring up  to 1 cm noted. CT scan recommended for further evaluation. No definite  consolidation otherwise in the lungs. The cardiac silhouette is not  enlarged. Pulmonary vasculature is unremarkable. Lower thoracic spine  compression deformity.    VANI GONSALEZ MD         SYSTEM ID:  [id number]   CT Chest Pulmonary Embolism w Contrast    Narrative    CT CHEST PULMONARY EMBOLISM WITH CONTRAST 5/4/2022 12:40 PM    CLINICAL HISTORY: Chest pain.     TECHNIQUE: CT angiogram chest during arterial phase injection IV  contrast. 2D and 3D MIP reconstructions were performed by the CT  technologist. Dose reduction techniques were used.     CONTRAST: 57mL Isovue-370    COMPARISON: None.    FINDINGS:  ANGIOGRAM CHEST: Pulmonary arteries are normal caliber and negative  for pulmonary emboli. Thoracic aorta is negative for dissection. No CT  evidence of right heart strain.    LUNGS AND PLEURA: 4.5 cm mass in the left upper lobe. Nodules on the  right measuring up to 1.5 cm. No effusions.    MEDIASTINUM/AXILLAE: Right hilar and subcarinal adenopathy with a  right hilar lymph node measuring 1.9 x 1.5 cm.    UPPER ABDOMEN: Indeterminate liver lesion in the posterior right lobe  measuring 4.1 cm.    MUSCULOSKELETAL: T11 compression deformity appears pathologic. Erosive  bony lesion in L2. Destructive bony lesion in the anterolateral left  fourth rib.      Impression    IMPRESSION:  1.  No pulmonary embolism demonstrated.  2.  4.5 cm mass in the left upper lobe, possibly a primary lung  malignancy.  3.  Contralateral pulmonary metastases.  4.  Indeterminant liver lesions.  5.  Bony metastatic disease.    VANI GONSALEZ MD         SYSTEM ID:  [id number]   MR Thoracic Spine w/o & w Contrast    Narrative    EXAM: MR THORACIC SPINE W/O and W CONTRAST  LOCATION: [facility]  DATE/TIME: 5/4/2022 7:57 PM    INDICATION: Bony lesion identified on CT chest study dated 05/04/2022. Probable primary lung  malignancy left lung.  COMPARISON: MRI lumbar spine dated 05/04/2022.  CONTRAST: 6mL Gadavist  TECHNIQUE: Routine Thoracic Spine MRI without and with IV contrast.    FINDINGS:   Osseous metastases are seen to nearly completely replace the L2 vertebral body, the posterior aspect of the T12 vertebral body completely replace the T11 vertebral body which demonstrates 70-80% pathologic compression deformity and retropulsion, and the   anterior aspect of the T10 vertebral body. Moderate retropulsion at T11 results in mild effacement of the left anterior aspect of the spinal cord and mild spinal canal stenosis. No other evidence of significant spinal canal or neural foraminal stenosis.   Normal facets.  No abnormal cord signal.     5 cm soft tissue mass left lung. 1.5 cm soft tissue mass right lung. No other extraspinal abnormalities identified.      Impression    IMPRESSION:  1.  Osseous metastases are seen to nearly completely replace the L2 vertebral body, the posterior aspect of the T12 vertebral body completely replace the T11 vertebral body which demonstrates 70-80% pathologic compression deformity and retropulsion, and   the anterior aspect of the T10 vertebral body. Moderate retropulsion at T11 results in mild effacement of the left anterior aspect of the spinal cord and mild spinal canal stenosis. No other evidence of significant spinal canal or neural foraminal   stenosis.   2.  5 cm mass left lung. 1.5 cm mass right lung.    Report called to Dr. Mahoney in the [facility] emergency department at 1925 hours.   MR Lumbar Spine w/o & w Contrast    Narrative    EXAM: MR LUMBAR SPINE W/O and W CONTRAST  LOCATION: [facility]  DATE/TIME: 5/4/2022 7:58 PM    INDICATION: Back pain and lumbar radiculopathy. Probable metastatic lung cancer.  COMPARISON: CT chest dated 05/04/2022. MRI of thoracic spine dated 05/04/2022.  CONTRAST: 6mL Gadavist  TECHNIQUE: Routine Lumbar Spine MRI without and  with IV contrast.    FINDINGS:   Nomenclature is based on 5 lumbar type vertebral bodies. Osseous metastases are seen to nearly completely replace the L2 vertebral body, the posterior aspect of the T12 vertebral body completely replace the T11 vertebral body which demonstrates 70-80%   pathologic compression deformity and retropulsion, and the anterior aspect of the T10 vertebral body. Normal distal spinal cord and cauda equina with conus medullaris at L1. 5 cm mass lesion left upper lung. Unremarkable visualized bony pelvis.    T12-L1: Normal disc height and signal. No herniation. Normal facets. No spinal canal or neural foraminal stenosis.     L1-L2: Normal disc height and signal. No herniation. Normal facets. No spinal canal or neural foraminal stenosis.    L2-L3: Mild retropulsion of the posterior left aspect of the inferior L3 vertebral body secondary to metastatic disease with moderate secondary effacement of the left neural foramen. Small central disc protrusion. Mild spinal canal stenosis and mild   right neural foraminal stenosis. Moderate bilateral facet arthropathy.     L3-L4: Loss of disc space height and signal.. No herniation. . Mild to moderate facet arthropathy bilaterally. No spinal canal or neural foraminal stenosis.    L4-L5: Mild loss of disc space height. 5 mm of anterolisthesis of L4 on L5. Herniation. Disc bulge. Moderately severe bilateral facet arthropathy. Mild spinal canal stenosis and right and left neural foraminal stenosis.    L5-S1: Moderate loss of disc space height and signal. Mild bilateral facet arthropathy. Central disc bulge. No herniation. No significant spinal canal or neural foraminal stenosis.      Impression    IMPRESSION:  1.  Osseous metastases are seen to nearly completely replace the L2 vertebral body, the posterior aspect of the T12 vertebral body completely replace the T11 vertebral body which demonstrates 70-80% pathologic compression deformity and retropulsion, and    the anterior aspect of the T10 vertebral body.  2.  No high-grade spinal canal or neural foraminal stenosis. Retropulsion of pathologic compression fracture at T11 with moderate effacement of the anterior spinal cord and mild spinal canal stenosis.    Report called to Dr. Mahoney in the [facility] emergency department at 1925 hours.   CT Abdomen Pelvis w Contrast    Narrative    EXAM: CT ABDOMEN PELVIS W CONTRAST  LOCATION: [facility]  DATE/TIME: 5/4/2022 6:46 PM    INDICATION: Non-small cell lung cancer, staging  COMPARISON: None.  TECHNIQUE: CT scan of the abdomen and pelvis was performed following injection of IV contrast. Multiplanar reformats were obtained. Dose reduction techniques were used.  CONTRAST: 68 mL Isovue 370    FINDINGS:   LOWER CHEST: Faint groundglass opacities present within inferior aspects of right middle and lower lobe.    HEPATOBILIARY: 4.6 x 3.8 cm low-attenuation mass abutting peripheral capsule segment 8.    PANCREAS: Normal.    SPLEEN: Normal.    ADRENAL GLANDS: Normal.    KIDNEYS/BLADDER: Normal.    BOWEL: Negative.    LYMPH NODES: Normal.    VASCULATURE: Moderate diffuse atherosclerotic plaque.    PELVIC ORGANS: 3 cm simple appearing left adnexal cyst. No free fluid.    MUSCULOSKELETAL: Severe compression of T10 with infiltrative appearance of bone and retropulsion of bone slightly into the central canal. Infiltrative lytic process also present within L2 vertebral body consistent with metastatic disease. Prior right hip   arthroplasty.      Impression    IMPRESSION:   1.  4.6 cm liver mass consistent with metastasis.  2.  Spinal lesions also consistent with metastasis and seen on MRI exam from today.  3.  3 cm simple appearing left adnexal cyst favored to be benign but would be best evaluated by follow-up pelvic ultrasound.   CT Liver Biopsy    Narrative    CT BIOPSY LIVER PERCUTANEOUS  5/6/2022 3:03 PM    HISTORY: 30-pack-year smoking history with  left upper lobe lung mass  and possible metastatic disease to liver and bones. Clinical suspicion  for metastatic lung cancer. CT-guided biopsy of liver mass for  definitive histologic diagnosis and staging.    COMPARISON: None.    TECHNIQUE: Volumetric helical acquisition of CT images without  contrast. Radiation dose for this scan was reduced using automated  exposure control, adjustment of the mA and/or kV according to patient  size, or iterative reconstruction technique.    MEDICATIONS:  9ml Lidocaine 1% locally, CHASE Nair and Milli Fuller,  sedation time 12 minutes, .5ng Versed, 25mcg Fentanyl.    FINDINGS: After written and oral informed consent was obtained and a  pause for the cause procedure verifying the correct procedure and the  correct patient, the area was prepped and draped in the usual sterile  fashion. The overlying soft tissues were anesthetized with 9 mL of 1%  subcutaneous lidocaine. Utilizing CT guidance 6 passes were made into  the lesion in the posterior right lobe of the liver with an 18 gauge  biopsy device and the tissue was submitted to pathology. There were no  immediate complications.     CONSCIOUS SEDATION NOTE: After obtaining consent for sedation,  conscious sedation was induced using IV Versed and IV fentanyl.  Patient was monitored by nurse under my direct supervision throughout  the exam. There were no complications of the sedation. 15 minutes  face-to-face time.      Impression    IMPRESSION:   1. Technically successful liver lesion biopsy.   2. Conscious sedation.   Echocardiogram Complete     Value    LVEF  60%    Narrative    [id number]  [id number]  [id number]  [id number]^ADIN^DOMINICK^GENNY     [facility]  Echocardiography Laboratory  [address] [place] [address]     Name: MARVA JONES  MRN: [id number]  : 1934  Study Date: 2022 10:35 AM  Age: 88 yrs  Gender: Female  Patient Location: [facility]  Reason For Study: Atrial Fibrillation  Ordering  Physician: DOMINICK OAKLEY  Referring Physician: Vel Calvert  Performed By: Vaibhav Arroyo     BSA: 1.6 m2  Height: 65 in  Weight: 125 lb  HR: 76  BP: 97/56 mmHg  ______________________________________________________________________________  Procedure  Complete Portable Echo Adult.  ______________________________________________________________________________  Interpretation Summary     1. The left ventricle is normal in structure, function and size. The visual  ejection fraction is estimated at 60%.  2. The right ventricle is normal in structure, function and size.  3. Mild (35-45mmHg) pulmonary hypertension is present. The right ventricular  systolic pressure is approximated at 41mmHg plus the right atrial pressure.     No previous echo for comparison.  ______________________________________________________________________________  Left Ventricle  The left ventricle is normal in structure, function and size. There is normal  left ventricular wall thickness. The visual ejection fraction is estimated at  60%. Left ventricular diastolic function is normal. Normal left ventricular  wall motion.     Right Ventricle  The right ventricle is normal in structure, function and size.     Atria  The left atrium is mildly dilated. The right atrium is moderately dilated.  There is no atrial shunt seen.     Mitral Valve  There is trace mitral regurgitation.     Tricuspid Valve  There is mild (1+) tricuspid regurgitation. Mild (35-45mmHg) pulmonary  hypertension is present. The right ventricular systolic pressure is  approximated at 41mmHg plus the right atrial pressure.     Aortic Valve  The aortic valve is normal in structure and function.     Pulmonic Valve  The pulmonic valve is normal in structure and function.     Vessels  Normal ascending, transverse (arch), and descending aorta. The inferior vena  cava was normal in size with preserved respiratory variability.     Pericardium  There is no pericardial  effusion.     Rhythm  Sinus rhythm was noted.  ______________________________________________________________________________  MMode/2D Measurements & Calculations  IVSd: 0.79 cm     LVIDd: 4.5 cm  LVIDs: 3.3 cm  LVPWd: 0.81 cm  FS: 26.4 %  LV mass(C)d: 115.0 grams  LV mass(C)dI: 71.0 grams/m2  Ao root diam: 3.1 cm  LA dimension: 3.7 cm  asc Aorta Diam: 2.8 cm  LA/Ao: 1.2  LVOT diam: 2.0 cm  LVOT area: 3.3 cm2  LA Volume (BP): 61.0 ml  LA Volume Index (BP): 37.7 ml/m2  RWT: 0.36     Doppler Measurements & Calculations  MV E max jojo: 126.0 cm/sec  MV dec slope: 795.9 cm/sec2  MV dec time: 0.16 sec  PA acc time: 0.17 sec  TR max jojo: 320.3 cm/sec  TR max P.0 mmHg  Lateral E/e': 16.8     ______________________________________________________________________________  Report approved by: Marcell Dillon 2022 12:47 PM               Discharge Medications   Current Discharge Medication List      START taking these medications    Details   digoxin (LANOXIN) 125 MCG tablet Take 1 tablet (125 mcg) by mouth daily  Qty: 30 tablet, Refills: 0    Associated Diagnoses: Atrial fibrillation with RVR (H)      metoprolol succinate ER (TOPROL XL) 25 MG 24 hr tablet Take 0.5 tablets (12.5 mg) by mouth daily  Qty: 15 tablet, Refills: 0    Associated Diagnoses: Atrial fibrillation with RVR (H)         CONTINUE these medications which have NOT CHANGED    Details   acetaminophen (TYLENOL) 325 MG tablet Take 2 tablets (650 mg) by mouth every 4 hours as needed for other (surgical pain)  Qty: 60 tablet, Refills: 0    Associated Diagnoses: Status post total left knee replacement      aspirin 81 MG EC tablet Take 81 mg by mouth daily      atorvastatin (LIPITOR) 10 MG tablet Take 10 mg by mouth daily.      calcium carbonate 600 mg-vitamin D 400 units (CALTRATE) 600-400 MG-UNIT per tablet Take 1 tablet by mouth 2 times daily      furosemide (LASIX) 20 MG tablet Take 20 mg by mouth daily      potassium chloride ER (KLOR-CON M) 10  MEQ CR tablet Take 10 mEq by mouth daily      AMOXICILLIN PO Take 2,000 mg by mouth daily as needed (1 hour prior to dental procedures)           Allergies   No Known Allergies

## 2022-05-07 NOTE — PLAN OF CARE
A/O x4. VSS ex hypotension on RA. Tele:NSR. Pt was in A fib RVR with HR in 100-115 till 0200 then converted to NSR. LS: clear. BS active. Regular diet. Denies pain. Up independently. R liver biopsy dressing CDI.

## 2022-05-09 ENCOUNTER — PATIENT OUTREACH (OUTPATIENT)
Dept: CARE COORDINATION | Facility: CLINIC | Age: 87
End: 2022-05-09
Payer: MEDICARE

## 2022-05-09 ENCOUNTER — TELEPHONE (OUTPATIENT)
Dept: CARDIOLOGY | Facility: CLINIC | Age: 87
End: 2022-05-09
Payer: MEDICARE

## 2022-05-09 DIAGNOSIS — Z71.89 OTHER SPECIFIED COUNSELING: ICD-10-CM

## 2022-05-09 LAB
INTERPRETATION: NORMAL
LAB DIRECTOR COMMENTS: NORMAL
LAB DIRECTOR DISCLAIMER: NORMAL
LAB DIRECTOR INTERPRETATION: NORMAL
LAB DIRECTOR METHODOLOGY: NORMAL
LAB DIRECTOR RESULTS: NORMAL
SIGNIFICANT RESULTS: NORMAL
SPECIMEN DESCRIPTION: NORMAL
SPECIMEN DESCRIPTION: NORMAL
TEST DETAILS, MDL: NORMAL

## 2022-05-09 NOTE — TELEPHONE ENCOUNTER
Patient was evaluated by cardiology while inpatient for back pain, unintentional weight loss, muscle wasting with new onset PAF with RVR during admission-started on IV Diltiazem and Heparin gtt's. PMH: HTN, HLD, 30 pack year history of smoking, quit approx 28 years ago. Echo showed EF 60%, mild pulmonary HTN, 1+ TR and normal RV size. No wall motion abnormalities. CT finding of right upper lobe mass suspicious for metastatic lung cancer, abdominal CT showing liver mass and MRI showing spinal lesions. 5/6/22: US guided biopsy of liver mass-pathology results are pending. Converted to NSR and transitioned to po Digoxin and Metoprolol. Per Dr. Guajardo IP note, will discuss OAC during follow up OV. Writer attempted to call patient to discuss any post hospital d/c questions she may have, review medication changes, and confirm f/u appts, but no answer. VM left to return my phone call. RN will confirm with patient that she has an OV scheduled on 5/17/22 at 1600 with NENA Nani Salas at our [place] Office. CECILIA Thompson RN.

## 2022-05-09 NOTE — PROGRESS NOTES
Clinic Care Coordination Contact  [facility]/Voicemail       Clinical Data: Care Coordinator Outreach  Outreach attempted x 1.  Left message on patient's voicemail with call back information and requested return call.  Plan:  Care Coordinator will try to reach patient again in 1-2 business days.    Yusra Patel  Care Transitions Assistant  [facility]

## 2022-05-10 LAB
PATH REPORT.COMMENTS IMP SPEC: ABNORMAL
PATH REPORT.COMMENTS IMP SPEC: ABNORMAL
PATH REPORT.COMMENTS IMP SPEC: YES
PATH REPORT.FINAL DX SPEC: ABNORMAL
PATH REPORT.GROSS SPEC: ABNORMAL
PATH REPORT.MICROSCOPIC SPEC OTHER STN: ABNORMAL
PHOTO IMAGE: ABNORMAL

## 2022-05-10 PROCEDURE — 81445 SO NEO GSAP 5-50DNA/DNA&RNA: CPT | Performed by: INTERNAL MEDICINE

## 2022-05-10 PROCEDURE — 81455 SO/HL 51/>GSAP DNA/DNA&RNA: CPT | Performed by: INTERNAL MEDICINE

## 2022-05-10 PROCEDURE — 88341 IMHCHEM/IMCYTCHM EA ADD ANTB: CPT | Mod: 26 | Performed by: PATHOLOGY

## 2022-05-10 PROCEDURE — 88360 TUMOR IMMUNOHISTOCHEM/MANUAL: CPT | Mod: 26 | Performed by: PATHOLOGY

## 2022-05-10 PROCEDURE — 88307 TISSUE EXAM BY PATHOLOGIST: CPT | Mod: 26 | Performed by: PATHOLOGY

## 2022-05-10 PROCEDURE — 88342 IMHCHEM/IMCYTCHM 1ST ANTB: CPT | Mod: 26 | Performed by: PATHOLOGY

## 2022-05-10 NOTE — PROGRESS NOTES
Clinic Care Coordination Contact  [facility]/Voicemail       Clinical Data: Care Coordinator Outreach  Outreach attempted x 2.  Left message on patient's voicemail with call back information and requested return call.  Plan: Care Coordinator will do no further outreaches at this time.    Yusra Patel  Care Transitions Assistant  [facility]

## 2022-05-11 LAB
ATRIAL RATE - MUSE: 312 BPM
DIASTOLIC BLOOD PRESSURE - MUSE: NORMAL MMHG
INTERPRETATION ECG - MUSE: NORMAL
P AXIS - MUSE: NORMAL DEGREES
PR INTERVAL - MUSE: NORMAL MS
QRS DURATION - MUSE: 122 MS
QT - MUSE: 352 MS
QTC - MUSE: 486 MS
R AXIS - MUSE: 8 DEGREES
SYSTOLIC BLOOD PRESSURE - MUSE: NORMAL MMHG
T AXIS - MUSE: 62 DEGREES
VENTRICULAR RATE- MUSE: 115 BPM

## 2022-05-13 ENCOUNTER — TRANSFERRED RECORDS (OUTPATIENT)
Dept: HEALTH INFORMATION MANAGEMENT | Facility: CLINIC | Age: 87
End: 2022-05-13
Payer: MEDICARE

## 2022-05-13 NOTE — TELEPHONE ENCOUNTER
Writer called pt and she denies any palpitations, chest pain, SOB or lightheadedness. Denies any medication questions. Reviewed follow up appt as scheduled below. Pt. Verbalized understanding without further questions. CECILIA Thompson RN.

## 2022-05-17 ENCOUNTER — OFFICE VISIT (OUTPATIENT)
Dept: CARDIOLOGY | Facility: CLINIC | Age: 87
End: 2022-05-17
Payer: MEDICARE

## 2022-05-17 VITALS
OXYGEN SATURATION: 96 % | DIASTOLIC BLOOD PRESSURE: 68 MMHG | SYSTOLIC BLOOD PRESSURE: 122 MMHG | HEART RATE: 74 BPM | WEIGHT: 118.2 LBS | BODY MASS INDEX: 19.69 KG/M2 | HEIGHT: 65 IN

## 2022-05-17 DIAGNOSIS — I48.91 ATRIAL FIBRILLATION WITH RVR (H): Primary | ICD-10-CM

## 2022-05-17 PROCEDURE — 99214 OFFICE O/P EST MOD 30 MIN: CPT | Performed by: PHYSICIAN ASSISTANT

## 2022-05-17 RX ORDER — DIGOXIN 125 MCG
125 TABLET ORAL DAILY
Qty: 90 TABLET | Refills: 1 | Status: SHIPPED | OUTPATIENT
Start: 2022-05-17 | End: 2022-10-05

## 2022-05-17 RX ORDER — METOPROLOL SUCCINATE 25 MG/1
12.5 TABLET, EXTENDED RELEASE ORAL DAILY
Qty: 45 TABLET | Refills: 1 | Status: SHIPPED | OUTPATIENT
Start: 2022-05-17 | End: 2022-10-05

## 2022-05-17 NOTE — LETTER
5/17/2022    Vel Calvert MD  [address] [address]    RE: Gely Tam       Dear Colleague,     I had the pleasure of seeing Gely Tam in the [facility].    Cardiology Progress Note    Patient seen today in follow up of: post hospital  Primary cardiologist: will follow-up wit Dr. Guajardo    HPI:  Gely Tam is a very pleasant 88 year old female with a history of recently diagnosed metastatic lung cancer, hyperlipidemia, hypertension, and recently diagnosed atrial fibrillation who is here today for follow up.    Gely was admitted on 5/4/22 with atrial fibrillation. She was experiencing leg edema thus an echocardiogram was ordered by her PCP. At the clinic, she was noted to be in atrial fibrillation with RVR which is a new diagnosis for her. Additionally, she reported unintentional weight loss. A CT showed a 4.5 cm mass in her DIANNA concerning for a possible primary lung malignancy. Indeterminate liver lesions, bony metastatic disease, and contralateral pulmonary metastases were also seen.     She was seen by Dr. Guajardo for her atrial fibrillation. She was started on a diltiazem drip for rate control. She, I believe, converted at one point to NSR but had recurrent atrial fibrillation. Diltiazem was stopped and she was discharged on digoxin for rate control as her blood pressure was marginal as well as low dose metoprolol. She was placed on an IV heparin drip for anticoagulation as she was going to require a liver biopsy. We recommended Eliquis 2.5 mg twice daily when okay following her biopsy. Her echo showed normal LV function and mild pulmonary hypertension.     Gely is back today for post hospital follow up. She presents with her son and his wife. She has been stable following her hospital discharge. HRs have been under good control. She was not aware of her atrial fibrillation but has not had issues with palpitations since discharge.  Blood pressure is stable in clinic today. She has not had dizziness or lightheadedness. Her liver biopsy returned showing metastatic adenocarcinoma, consistent with lung origin. Her son tells me the plan is likely for immunotherapy although some specific labs are pending. She has follow up with Dr. Florence next week. Of note, she was not discharged on anticoagulation.     ASSESSMENT/PLAN:  Gely Tam is a very pleasant 88 year old female with a history of recently diagnosed metastatic lung cancer and paroxsymal atrial fibrillation who is here today for post hospital follow up. Her recent echo showed normal LV function.     We talked about atrial fibrillation in detail today. On exam today, she appears to be in atrial fibrillation. Her heart rates however are controlled on digoxin 125 mcg daily and low dose Metoprolol XL 12.5 mg daily. BP is stable in clinic today and I think if necessary we can consider increasing that in the future if needed. Her [id number] score is elevated and she would benefit from anticoagulation. We had recommended 2.5 mg of Eliquis twice daily following her liver biopsy when appropriate from a bleeding risk but this was not started. We discussed that today. I've written a prescription for Eliquis. If this is too expensive, I asked them to let me know however she does not want to be on warfarin. She mentions an injection for her back pain however on further discussion this seems to be an IV medication. Assuming there are no further invasive procedures planned, she will start the Eliquis. I asked her to stop her 81 mg aspirin when she starts warfarin. If she has any bleeding issues, she will let us know.     I did not change her medications today. If she has issues with more rapid rates and BPs are stable, we can increase her metoprolol.     I'll have her return to see Dr. Guajardo in a few months. In the meantime, we talked about signs/symptoms to call the clinic for. She will follow up with  her Oncology team as scheduled. It was a pleasure seeing Gely in clinic today.    Orders this Visit:  Orders Placed This Encounter   Procedures     Follow-Up with Cardiology     Orders Placed This Encounter   Medications     digoxin (LANOXIN) 125 MCG tablet     Sig: Take 1 tablet (125 mcg) by mouth daily     Dispense:  90 tablet     Refill:  1     metoprolol succinate ER (TOPROL XL) 25 MG 24 hr tablet     Sig: Take 0.5 tablets (12.5 mg) by mouth daily     Dispense:  45 tablet     Refill:  1     apixaban ANTICOAGULANT (ELIQUIS) 2.5 MG tablet     Sig: Take 1 tablet (2.5 mg) by mouth 2 times daily     Dispense:  180 tablet     Refill:  1     Medications Discontinued During This Encounter   Medication Reason     digoxin (LANOXIN) 125 MCG tablet Reorder     metoprolol succinate ER (TOPROL XL) 25 MG 24 hr tablet Reorder     aspirin 81 MG EC tablet        CURRENT MEDICATIONS:  Current Outpatient Medications   Medication Sig Dispense Refill     acetaminophen (TYLENOL) 325 MG tablet Take 2 tablets (650 mg) by mouth every 4 hours as needed for other (surgical pain) 60 tablet 0     AMOXICILLIN PO Take 2,000 mg by mouth daily as needed (1 hour prior to dental procedures)       apixaban ANTICOAGULANT (ELIQUIS) 2.5 MG tablet Take 1 tablet (2.5 mg) by mouth 2 times daily 180 tablet 1     atorvastatin (LIPITOR) 10 MG tablet Take 10 mg by mouth daily.       calcium carbonate 600 mg-vitamin D 400 units (CALTRATE) 600-400 MG-UNIT per tablet Take 1 tablet by mouth 2 times daily       digoxin (LANOXIN) 125 MCG tablet Take 1 tablet (125 mcg) by mouth daily 90 tablet 1     furosemide (LASIX) 20 MG tablet Take 20 mg by mouth daily       metoprolol succinate ER (TOPROL XL) 25 MG 24 hr tablet Take 0.5 tablets (12.5 mg) by mouth daily 45 tablet 1     potassium chloride ER (KLOR-CON M) 10 MEQ CR tablet Take 10 mEq by mouth daily       ALLERGIES  No Known Allergies  PAST MEDICAL HISTORY:  Past Medical History:   Diagnosis Date     Arthritis   "    HTN (hypertension)      Hyperlipemia      PAST SURGICAL HISTORY:  Past Surgical History:   Procedure Laterality Date     ARTHROPLASTY HIP  10/26/2012    Procedure: ARTHROPLASTY HIP;  RIGHT TOTAL HIP ARTHROPLASTY (J&J)^;  Surgeon: Diana Mcclellan MD;  Location: [facility]     ARTHROPLASTY KNEE Left 2018    Procedure: ARTHROPLASTY KNEE;  LEFT TOTAL KNEE ARTHROPLASTY ;  Surgeon: Diana Mcclellan MD;  Location: [facility] OR     GYN SURGERY       HYSTERECTOMY      left ovary remains     LAPAROSCOPIC SALPINGOTOMY      bilat,. benign      ORTHOPEDIC SURGERY  2010    right tka     SOFT TISSUE SURGERY   vein ligation     FAMILY HISTORY:  Family History   Problem Relation Age of Onset     Breast Cancer Other         aunt     SOCIAL HISTORY:  Social History     Socioeconomic History     Marital status:      Spouse name: Dusty     Number of children: 4     Years of education: None     Highest education level: None   Occupational History     Occupation: Unknown     Employer: UNKNOWN   Tobacco Use     Smoking status: Former Smoker     Packs/day: 1.00     Years: 30.00     Pack years: 30.00     Quit date: 1995     Years since quittin.3     Smokeless tobacco: Never Used   Substance and Sexual Activity     Alcohol use: Yes     Alcohol/week: 0.0 standard drinks     Comment: 1/day   wine     Drug use: No     Sexual activity: Never     Partners: Male     Birth control/protection: Post-menopausal   Social History Narrative    No advanced care directives on file.     - Dusty     Physical Exam:  Vitals: /68 (BP Location: Right arm, Patient Position: Sitting)   Pulse 74   Ht 1.651 m (5' 5\")   Wt 53.6 kg (118 lb 3.2 oz)   SpO2 96%   BMI 19.67 kg/m     Wt Readings from Last 4 Encounters:   22 53.6 kg (118 lb 3.2 oz)   22 56.4 kg (124 lb 5.4 oz)   18 63.8 kg (140 lb 9.6 oz)   18 64.8 kg (142 lb 12.8 oz)     GEN: well nourished, in no acute distress.  HEENT:  Pupils " equal, round. Sclerae nonicteric.   C/V: IRR  RESP: Respirations are unlabored. Clear to auscultation bilaterally without wheezing, rales, or rhonchi.  GI: Abdomen soft, nontender.  EXTREM: no LE edema.  NEURO: Alert and oriented, cooperative.  SKIN: Warm and dry.     Recent Lab Results:  LIPID RESULTS:  Lab Results   Component Value Date    CHOL 195 05/24/2012    HDL 74 05/24/2012    LDL 88 05/24/2012    TRIG 163 (H) 05/24/2012    CHOLHDLRATIO 2.6 05/24/2012     LIVER ENZYME RESULTS:  Lab Results   Component Value Date    AST 11 05/05/2022    AST 15 05/24/2012    ALT 12 05/05/2022    ALT 15 05/24/2012     CBC RESULTS:  Lab Results   Component Value Date    WBC 6.9 05/05/2022    WBC 4.9 10/30/2012    RBC 3.91 05/05/2022    RBC 2.95 (L) 10/30/2012    HGB 11.7 05/05/2022    HGB 11.5 (A) 02/07/2018    HCT 35.7 05/05/2022    HCT 28.0 (L) 10/30/2012    MCV 91 05/05/2022    MCV 95 10/30/2012    MCH 29.9 05/05/2022    MCH 32.2 10/30/2012    MCHC 32.8 05/05/2022    MCHC 33.9 10/30/2012    RDW 15.5 (H) 05/05/2022    RDW 13.3 10/30/2012     05/07/2022     10/30/2012     BMP RESULTS:  Lab Results   Component Value Date     05/05/2022     02/07/2018    POTASSIUM 3.9 05/07/2022    POTASSIUM 4.3 02/07/2018    CHLORIDE 108 05/05/2022    CHLORIDE 101 02/07/2018    CO2 26 05/05/2022    CO2 30 02/07/2018    ANIONGAP 4 05/05/2022    ANIONGAP 6 02/07/2018     (H) 05/05/2022     (H) 05/05/2022     (A) 02/07/2018    BUN 9 05/05/2022    BUN 13 02/07/2018    CR 0.54 05/06/2022    CR 0.54 02/07/2018    GFRESTIMATED 88 05/06/2022    GFRESTIMATED >90 02/07/2018    GFRESTBLACK >90 02/07/2018    ED 8.5 05/05/2022    ED 9.1 02/07/2018      A1C RESULTS:  No results found for: A1C  INR RESULTS:  Lab Results   Component Value Date    INR 1.06 05/06/2022    INR 1.74 (H) 10/30/2012    INR 2.10 (H) 10/29/2012       Michelle Salas PA-C  [facility]    Thank you for allowing me to participate in the care of  your patient.      Sincerely,     HENRY Hayes [facility]  cc:   No referring provider defined for this encounter.

## 2022-05-17 NOTE — PATIENT INSTRUCTIONS
"Thank you for your [facility] visit today. Your provider has recommended the following:    Medication Changes:  STOP aspirin.  As long as no procedures planned - start Eliquis 2.5 mg twice daily   Recommendations:  Call with any concerns.  Follow-up:  See Dr. Guajardo for cardiology follow up in 3 months.    Reminder:  Please bring in all current medications, over the counter supplements and vitamin bottles to your next appointment.  Important \"[facility]\" telephone numbers for your reference:  Cardiology Scheduling - [phone]  Cardiology Clinic RN-  [phone]     [facility]    "

## 2022-05-17 NOTE — PROGRESS NOTES
Cardiology Progress Note    Patient seen today in follow up of: post hospital  Primary cardiologist: will follow-up wit Dr. Guajardo    HPI:  Gely Tam is a very pleasant 88 year old female with a history of recently diagnosed metastatic lung cancer, hyperlipidemia, hypertension, and recently diagnosed atrial fibrillation who is here today for follow up.    Gely was admitted on 5/4/22 with atrial fibrillation. She was experiencing leg edema thus an echocardiogram was ordered by her PCP. At the clinic, she was noted to be in atrial fibrillation with RVR which is a new diagnosis for her. Additionally, she reported unintentional weight loss. A CT showed a 4.5 cm mass in her DIANNA concerning for a possible primary lung malignancy. Indeterminate liver lesions, bony metastatic disease, and contralateral pulmonary metastases were also seen.     She was seen by Dr. Guajardo for her atrial fibrillation. She was started on a diltiazem drip for rate control. She, I believe, converted at one point to NSR but had recurrent atrial fibrillation. Diltiazem was stopped and she was discharged on digoxin for rate control as her blood pressure was marginal as well as low dose metoprolol. She was placed on an IV heparin drip for anticoagulation as she was going to require a liver biopsy. We recommended Eliquis 2.5 mg twice daily when okay following her biopsy. Her echo showed normal LV function and mild pulmonary hypertension.     Gely is back today for post hospital follow up. She presents with her son and his wife. She has been stable following her hospital discharge. HRs have been under good control. She was not aware of her atrial fibrillation but has not had issues with palpitations since discharge. Blood pressure is stable in clinic today. She has not had dizziness or lightheadedness. Her liver biopsy returned showing metastatic adenocarcinoma, consistent with lung origin. Her son tells me the plan is likely for immunotherapy  although some specific labs are pending. She has follow up with Dr. Florence next week. Of note, she was not discharged on anticoagulation.     ASSESSMENT/PLAN:  Gely Tam is a very pleasant 88 year old female with a history of recently diagnosed metastatic lung cancer and paroxsymal atrial fibrillation who is here today for post hospital follow up. Her recent echo showed normal LV function.     We talked about atrial fibrillation in detail today. On exam today, she appears to be in atrial fibrillation. Her heart rates however are controlled on digoxin 125 mcg daily and low dose Metoprolol XL 12.5 mg daily. BP is stable in clinic today and I think if necessary we can consider increasing that in the future if needed. Her [id number] score is elevated and she would benefit from anticoagulation. We had recommended 2.5 mg of Eliquis twice daily following her liver biopsy when appropriate from a bleeding risk but this was not started. We discussed that today. I've written a prescription for Eliquis. If this is too expensive, I asked them to let me know however she does not want to be on warfarin. She mentions an injection for her back pain however on further discussion this seems to be an IV medication. Assuming there are no further invasive procedures planned, she will start the Eliquis. I asked her to stop her 81 mg aspirin when she starts warfarin. If she has any bleeding issues, she will let us know.     I did not change her medications today. If she has issues with more rapid rates and BPs are stable, we can increase her metoprolol.     I'll have her return to see Dr. Guajardo in a few months. In the meantime, we talked about signs/symptoms to call the clinic for. She will follow up with her Oncology team as scheduled. It was a pleasure seeing Gely in clinic today.    Orders this Visit:  Orders Placed This Encounter   Procedures     Follow-Up with Cardiology     Orders Placed This Encounter   Medications     digoxin  (LANOXIN) 125 MCG tablet     Sig: Take 1 tablet (125 mcg) by mouth daily     Dispense:  90 tablet     Refill:  1     metoprolol succinate ER (TOPROL XL) 25 MG 24 hr tablet     Sig: Take 0.5 tablets (12.5 mg) by mouth daily     Dispense:  45 tablet     Refill:  1     apixaban ANTICOAGULANT (ELIQUIS) 2.5 MG tablet     Sig: Take 1 tablet (2.5 mg) by mouth 2 times daily     Dispense:  180 tablet     Refill:  1     Medications Discontinued During This Encounter   Medication Reason     digoxin (LANOXIN) 125 MCG tablet Reorder     metoprolol succinate ER (TOPROL XL) 25 MG 24 hr tablet Reorder     aspirin 81 MG EC tablet        CURRENT MEDICATIONS:  Current Outpatient Medications   Medication Sig Dispense Refill     acetaminophen (TYLENOL) 325 MG tablet Take 2 tablets (650 mg) by mouth every 4 hours as needed for other (surgical pain) 60 tablet 0     AMOXICILLIN PO Take 2,000 mg by mouth daily as needed (1 hour prior to dental procedures)       apixaban ANTICOAGULANT (ELIQUIS) 2.5 MG tablet Take 1 tablet (2.5 mg) by mouth 2 times daily 180 tablet 1     atorvastatin (LIPITOR) 10 MG tablet Take 10 mg by mouth daily.       calcium carbonate 600 mg-vitamin D 400 units (CALTRATE) 600-400 MG-UNIT per tablet Take 1 tablet by mouth 2 times daily       digoxin (LANOXIN) 125 MCG tablet Take 1 tablet (125 mcg) by mouth daily 90 tablet 1     furosemide (LASIX) 20 MG tablet Take 20 mg by mouth daily       metoprolol succinate ER (TOPROL XL) 25 MG 24 hr tablet Take 0.5 tablets (12.5 mg) by mouth daily 45 tablet 1     potassium chloride ER (KLOR-CON M) 10 MEQ CR tablet Take 10 mEq by mouth daily       ALLERGIES  No Known Allergies  PAST MEDICAL HISTORY:  Past Medical History:   Diagnosis Date     Arthritis      HTN (hypertension)      Hyperlipemia      PAST SURGICAL HISTORY:  Past Surgical History:   Procedure Laterality Date     ARTHROPLASTY HIP  10/26/2012    Procedure: ARTHROPLASTY HIP;  RIGHT TOTAL HIP ARTHROPLASTY (J&J)^;  Surgeon:  "Diana Mcclellan MD;  Location: [facility] OR     ARTHROPLASTY KNEE Left 2018    Procedure: ARTHROPLASTY KNEE;  LEFT TOTAL KNEE ARTHROPLASTY ;  Surgeon: Diana Mcclellan MD;  Location: [facility] OR     GYN SURGERY       HYSTERECTOMY      left ovary remains     LAPAROSCOPIC SALPINGOTOMY      bilat,. benign      ORTHOPEDIC SURGERY  2010    right tka     SOFT TISSUE SURGERY  1984 vein ligation     FAMILY HISTORY:  Family History   Problem Relation Age of Onset     Breast Cancer Other         aunt     SOCIAL HISTORY:  Social History     Socioeconomic History     Marital status:      Spouse name: Dusty     Number of children: 4     Years of education: None     Highest education level: None   Occupational History     Occupation: Unknown     Employer: UNKNOWN   Tobacco Use     Smoking status: Former Smoker     Packs/day: 1.00     Years: 30.00     Pack years: 30.00     Quit date: 1995     Years since quittin.3     Smokeless tobacco: Never Used   Substance and Sexual Activity     Alcohol use: Yes     Alcohol/week: 0.0 standard drinks     Comment: 1/day   wine     Drug use: No     Sexual activity: Never     Partners: Male     Birth control/protection: Post-menopausal   Social History Narrative    No advanced care directives on file.    Dede Montano     Physical Exam:  Vitals: /68 (BP Location: Right arm, Patient Position: Sitting)   Pulse 74   Ht 1.651 m (5' 5\")   Wt 53.6 kg (118 lb 3.2 oz)   SpO2 96%   BMI 19.67 kg/m     Wt Readings from Last 4 Encounters:   22 53.6 kg (118 lb 3.2 oz)   22 56.4 kg (124 lb 5.4 oz)   18 63.8 kg (140 lb 9.6 oz)   18 64.8 kg (142 lb 12.8 oz)     GEN: well nourished, in no acute distress.  HEENT:  Pupils equal, round. Sclerae nonicteric.   C/V: IRR  RESP: Respirations are unlabored. Clear to auscultation bilaterally without wheezing, rales, or rhonchi.  GI: Abdomen soft, nontender.  EXTREM: no LE edema.  NEURO: Alert and oriented, " cooperative.  SKIN: Warm and dry.     Recent Lab Results:  LIPID RESULTS:  Lab Results   Component Value Date    CHOL 195 05/24/2012    HDL 74 05/24/2012    LDL 88 05/24/2012    TRIG 163 (H) 05/24/2012    CHOLHDLRATIO 2.6 05/24/2012     LIVER ENZYME RESULTS:  Lab Results   Component Value Date    AST 11 05/05/2022    AST 15 05/24/2012    ALT 12 05/05/2022    ALT 15 05/24/2012     CBC RESULTS:  Lab Results   Component Value Date    WBC 6.9 05/05/2022    WBC 4.9 10/30/2012    RBC 3.91 05/05/2022    RBC 2.95 (L) 10/30/2012    HGB 11.7 05/05/2022    HGB 11.5 (A) 02/07/2018    HCT 35.7 05/05/2022    HCT 28.0 (L) 10/30/2012    MCV 91 05/05/2022    MCV 95 10/30/2012    MCH 29.9 05/05/2022    MCH 32.2 10/30/2012    MCHC 32.8 05/05/2022    MCHC 33.9 10/30/2012    RDW 15.5 (H) 05/05/2022    RDW 13.3 10/30/2012     05/07/2022     10/30/2012     BMP RESULTS:  Lab Results   Component Value Date     05/05/2022     02/07/2018    POTASSIUM 3.9 05/07/2022    POTASSIUM 4.3 02/07/2018    CHLORIDE 108 05/05/2022    CHLORIDE 101 02/07/2018    CO2 26 05/05/2022    CO2 30 02/07/2018    ANIONGAP 4 05/05/2022    ANIONGAP 6 02/07/2018     (H) 05/05/2022     (H) 05/05/2022     (A) 02/07/2018    BUN 9 05/05/2022    BUN 13 02/07/2018    CR 0.54 05/06/2022    CR 0.54 02/07/2018    GFRESTIMATED 88 05/06/2022    GFRESTIMATED >90 02/07/2018    GFRESTBLACK >90 02/07/2018    ED 8.5 05/05/2022    ED 9.1 02/07/2018      A1C RESULTS:  No results found for: A1C  INR RESULTS:  Lab Results   Component Value Date    INR 1.06 05/06/2022    INR 1.74 (H) 10/30/2012    INR 2.10 (H) 10/29/2012       Michelle Salas PA-C  [facility]

## 2022-05-18 PROCEDURE — G0452 MOLECULAR PATHOLOGY INTERPR: HCPCS | Mod: 26 | Performed by: STUDENT IN AN ORGANIZED HEALTH CARE EDUCATION/TRAINING PROGRAM

## 2022-05-23 ENCOUNTER — ANCILLARY PROCEDURE (OUTPATIENT)
Dept: MRI IMAGING | Facility: CLINIC | Age: 87
End: 2022-05-23
Attending: INTERNAL MEDICINE
Payer: MEDICARE

## 2022-05-23 ENCOUNTER — TRANSFERRED RECORDS (OUTPATIENT)
Dept: HEALTH INFORMATION MANAGEMENT | Facility: CLINIC | Age: 87
End: 2022-05-23

## 2022-05-23 DIAGNOSIS — C34.12 SMALL CELL LUNG CANCER, LEFT UPPER LOBE (H): ICD-10-CM

## 2022-05-23 PROCEDURE — A9585 GADOBUTROL INJECTION: HCPCS | Performed by: INTERNAL MEDICINE

## 2022-05-23 PROCEDURE — 255N000002 HC RX 255 OP 636: Performed by: INTERNAL MEDICINE

## 2022-05-23 PROCEDURE — 70553 MRI BRAIN STEM W/O & W/DYE: CPT

## 2022-05-23 RX ORDER — GADOBUTROL 604.72 MG/ML
6 INJECTION INTRAVENOUS ONCE
Status: COMPLETED | OUTPATIENT
Start: 2022-05-23 | End: 2022-05-23

## 2022-05-23 RX ADMIN — GADOBUTROL 6 ML: 604.72 INJECTION INTRAVENOUS at 12:22

## 2022-05-24 PROCEDURE — G0452 MOLECULAR PATHOLOGY INTERPR: HCPCS | Mod: 26 | Performed by: STUDENT IN AN ORGANIZED HEALTH CARE EDUCATION/TRAINING PROGRAM

## 2022-06-20 ENCOUNTER — OFFICE VISIT (OUTPATIENT)
Dept: NEUROSURGERY | Facility: CLINIC | Age: 87
End: 2022-06-20
Payer: MEDICARE

## 2022-06-20 ENCOUNTER — TRANSFERRED RECORDS (OUTPATIENT)
Dept: HEALTH INFORMATION MANAGEMENT | Facility: CLINIC | Age: 87
End: 2022-06-20

## 2022-06-20 VITALS
HEART RATE: 72 BPM | SYSTOLIC BLOOD PRESSURE: 93 MMHG | BODY MASS INDEX: 18.97 KG/M2 | DIASTOLIC BLOOD PRESSURE: 46 MMHG | OXYGEN SATURATION: 99 % | WEIGHT: 114 LBS

## 2022-06-20 DIAGNOSIS — C79.51 METASTASIS TO SPINAL COLUMN (H): Primary | ICD-10-CM

## 2022-06-20 PROCEDURE — 99213 OFFICE O/P EST LOW 20 MIN: CPT | Performed by: NURSE PRACTITIONER

## 2022-06-20 ASSESSMENT — PAIN SCALES - GENERAL: PAINLEVEL: SEVERE PAIN (7)

## 2022-06-20 NOTE — Clinical Note
Just sending as FYI- Patient continues to refuse TLSO brace. We were consulted inpatient for thoracic and lumbar mets with T11 pathologic compression deformity. Imaging today showed T11 vertebral plana. She wants to see the pain clinic to discuss other pain control measures so I placed referral for that today.

## 2022-06-20 NOTE — PROGRESS NOTES
Dr. Reagan Chow   [facility] Neurosurgery Clinic Visit      CC: back pain    Primary Care Provider: Vel Calvert    Reason For Visit:   Hospital follow-up of thoracic and lumbar osseous metastases and T11 pathologic compression deformity      HPI: Gely Tam is an 88 year old female with history of lung cancer who presents for 6 week follow-up of thoracic and lumbar osseous metastases and T11 pathologic compression deformity. Patient was admitted on 5/4/22, imaging showed osseous metastases to nearly completely replace the L2 vertebral body, anterior aspect of the T10 vertebral body, the posterior aspect of the T12 vertebral body, and T11 vertebral body with 70-80% pathologic compression deformity and retropulsion, no high-grade spinal canal or neural foraminal stenosis. Neurosurgery was consulted and recommended TLSO brace with outpatient follow-up.      Today, patient reports feeling better with each day. She reports moderate mid to low back pain that mostly occurs with standing and prolonged walking. Denies radicular leg pain, paresthesias, or weakness. She has not been wearing TLSO brace despite education on the risk/benefits. Patient uses heat packs and Tylenol at bedtime for pain control. Denies any falls, foot drop, saddle anesthesia, or bladder/bowel incontinence. She continues to follow with oncologist Dr. Florence for Zometa infusions.     Current pain: 7/10     Past Medical History:   Diagnosis Date     Arthritis      HTN (hypertension)      Hyperlipemia        Past Medical History reviewed with patient during visit.    Past Surgical History:   Procedure Laterality Date     ARTHROPLASTY HIP  10/26/2012    Procedure: ARTHROPLASTY HIP;  RIGHT TOTAL HIP ARTHROPLASTY (J&J)^;  Surgeon: Diana Mcclellan MD;  Location:  OR     ARTHROPLASTY KNEE Left 1/30/2018    Procedure: ARTHROPLASTY KNEE;  LEFT TOTAL KNEE ARTHROPLASTY ;  Surgeon: Diana Mcclellan MD;  Location:  OR     GYN SURGERY        HYSTERECTOMY      left ovary remains     LAPAROSCOPIC SALPINGOTOMY      bilat,. benign      ORTHOPEDIC SURGERY  2010    right tka     SOFT TISSUE SURGERY   vein ligation     Past Surgical History reviewed with patient during visit.    Current Outpatient Medications   Medication     acetaminophen (TYLENOL) 325 MG tablet     AMOXICILLIN PO     apixaban ANTICOAGULANT (ELIQUIS) 2.5 MG tablet     atorvastatin (LIPITOR) 10 MG tablet     calcium carbonate 600 mg-vitamin D 400 units (CALTRATE) 600-400 MG-UNIT per tablet     digoxin (LANOXIN) 125 MCG tablet     furosemide (LASIX) 20 MG tablet     metoprolol succinate ER (TOPROL XL) 25 MG 24 hr tablet     potassium chloride ER (KLOR-CON M) 10 MEQ CR tablet     No current facility-administered medications for this visit.       No Known Allergies    Social History     Socioeconomic History     Marital status:      Spouse name: Dusty     Number of children: 4   Occupational History     Occupation: Unknown     Employer: UNKNOWN   Tobacco Use     Smoking status: Former Smoker     Packs/day: 1.00     Years: 30.00     Pack years: 30.00     Quit date: 1995     Years since quittin.4     Smokeless tobacco: Never Used   Substance and Sexual Activity     Alcohol use: Yes     Alcohol/week: 0.0 standard drinks     Comment: 1/day   wine     Drug use: No     Sexual activity: Never     Partners: Male     Birth control/protection: Post-menopausal   Social History Narrative    No advanced care directives on file.     - Dusty       Family History   Problem Relation Age of Onset     Breast Cancer Other         aunt       ROS: 10 point ROS neg other than the symptoms noted above in the HPI.    Vital Signs: BP 93/46   Pulse 72   Wt 114 lb (51.7 kg)   SpO2 99%   BMI 18.97 kg/m      Physical Examination:  Constitutional:  Alert, well nourished, NAD.  HEENT: Normocephalic, atraumatic.   Pulmonary:  Without shortness of breath, normal effort.    Cardiovascular:  No pitting edema of BLE.      Neurological:  Awake  Alert  Oriented x 3  Speech clear  Cranial nerves II - XII grossly intact  PERRL  EOMI  Face symmetric  Tongue midline  Motor exam:  Iliopsoas  (hip flexion)               Right: 5/5  Left:  5/5  Quadriceps  (knee extension)       Right:  5/5  Left:  5/5  Hamstrings  (knee flexion)            Right:  5/5  Left:  5/5  Gastroc Soleus  (PF)                          Right:  5/5  Left:  5/5  Tibialis Ant  (DF)                          Right:  5/5  Left:  5/5  EHL                          Right:  5/5  Left:  5/5         Sensation intact     Reflexes are 2+ in the patellar and Achilles. There is no clonus.     Musculoskeletal:  Gait: Able to stand from a seated position. Normal non-antalgic, non-myelopathic gait.   No tenderness of the spine or paraspinous muscles.  Negative SI joint tenderness bilaterally.  Negative straight leg raise bilaterally.      Imaging:   XR THORACIC SPINE 2 VIEWS 6/20/2022 12:13 PM   IMPRESSION: Pathologic fracture of T11 with vertebral plana, anterior  wedging, and segmental kyphosis from T10 to T12. Vertebral body  heights otherwise preserved. Left upper lobe lung mass.    XR LUMBAR SPINE 2-3 VIEWS 6/20/2022 12:13 PM                                                                 IMPRESSION: T11 pathologic fracture with vertebral plana and segmental  kyphosis at T10 and T12. Vertebral body heights otherwise preserved.  Lucent and sclerotic changes at the L2 level consistent with osseous  metastases. Grade 1 anterolisthesis of L4 on L5. Lower lumbar  degenerative disc disease and facet osteoarthrosis. Bilateral  sacroiliac joint osteoporosis. Right hip arthroplasty.    Assessment/Plan:   88 year old female with history of lung cancer who presents for 6 week follow-up of thoracic and lumbar osseous metastases and T11 pathologic compression deformity.     - We continue to recommend TLSO brace. Patient declines despite education  on risk/benefits.  - Patient is interested in other possible pain control measures. Placed referral to [facility] for comprehensive care.  - Continue follow-up with Oncology   - Follow up with our clinic in 6 weeks with xray prior as scheduled     Advised patient to call our clinic with any questions or concerns. Discussed red flag symptoms and advised to seek medical attention if these develop. Patient voiced understanding and agreement.      Corina Roldan CNP  [facility] Neurosurgery  [address] [address]  Tel [phone]  Pager [phone]

## 2022-06-20 NOTE — LETTER
6/20/2022         RE: Gely aTm  [address] [address] [facility] [id number]        Dear Colleague,    Thank you for referring your patient, Gely Tam, to the [facility]. Please see a copy of my visit note below.    Dr. Reagan Chow   [facility] Neurosurgery Clinic Visit      CC: back pain    Primary Care Provider: Vel Calvert    Reason For Visit:   Hospital follow-up of thoracic and lumbar osseous metastases and T11 pathologic compression deformity      HPI: Gely Tam is an 88 year old female with history of lung cancer who presents for 6 week follow-up of thoracic and lumbar osseous metastases and T11 pathologic compression deformity. Patient was admitted on 5/4/22, imaging showed osseous metastases to nearly completely replace the L2 vertebral body, anterior aspect of the T10 vertebral body, the posterior aspect of the T12 vertebral body, and T11 vertebral body with 70-80% pathologic compression deformity and retropulsion, no high-grade spinal canal or neural foraminal stenosis. Neurosurgery was consulted and recommended TLSO brace with outpatient follow-up.      Today, patient reports feeling better with each day. She reports moderate mid to low back pain that mostly occurs with standing and prolonged walking. Denies radicular leg pain, paresthesias, or weakness. She has not been wearing TLSO brace despite education on the risk/benefits. Patient uses heat packs and Tylenol at bedtime for pain control. Denies any falls, foot drop, saddle anesthesia, or bladder/bowel incontinence. She continues to follow with oncologist Dr. Florence for Zometa infusions.     Current pain: 7/10     Past Medical History:   Diagnosis Date     Arthritis      HTN (hypertension)      Hyperlipemia        Past Medical History reviewed with patient during visit.    Past Surgical History:   Procedure Laterality Date     ARTHROPLASTY HIP  10/26/2012    Procedure: ARTHROPLASTY HIP;   RIGHT TOTAL HIP ARTHROPLASTY (J&J)^;  Surgeon: Diana Mcclellan MD;  Location: [facility] OR     ARTHROPLASTY KNEE Left 2018    Procedure: ARTHROPLASTY KNEE;  LEFT TOTAL KNEE ARTHROPLASTY ;  Surgeon: Diana Mcclellan MD;  Location: [facility] OR     GYN SURGERY       HYSTERECTOMY      left ovary remains     LAPAROSCOPIC SALPINGOTOMY      bilat,. benign      ORTHOPEDIC SURGERY      right tka     SOFT TISSUE SURGERY   vein ligation     Past Surgical History reviewed with patient during visit.    Current Outpatient Medications   Medication     acetaminophen (TYLENOL) 325 MG tablet     AMOXICILLIN PO     apixaban ANTICOAGULANT (ELIQUIS) 2.5 MG tablet     atorvastatin (LIPITOR) 10 MG tablet     calcium carbonate 600 mg-vitamin D 400 units (CALTRATE) 600-400 MG-UNIT per tablet     digoxin (LANOXIN) 125 MCG tablet     furosemide (LASIX) 20 MG tablet     metoprolol succinate ER (TOPROL XL) 25 MG 24 hr tablet     potassium chloride ER (KLOR-CON M) 10 MEQ CR tablet     No current facility-administered medications for this visit.       No Known Allergies    Social History     Socioeconomic History     Marital status:      Spouse name: Dusty     Number of children: 4   Occupational History     Occupation: Unknown     Employer: UNKNOWN   Tobacco Use     Smoking status: Former Smoker     Packs/day: 1.00     Years: 30.00     Pack years: 30.00     Quit date: 1995     Years since quittin.4     Smokeless tobacco: Never Used   Substance and Sexual Activity     Alcohol use: Yes     Alcohol/week: 0.0 standard drinks     Comment: 1/day   wine     Drug use: No     Sexual activity: Never     Partners: Male     Birth control/protection: Post-menopausal   Social History Narrative    No advanced care directives on file.     - Dusty       Family History   Problem Relation Age of Onset     Breast Cancer Other         aunt       ROS: 10 point ROS neg other than the symptoms noted above in the HPI.    Vital  Signs: BP 93/46   Pulse 72   Wt 114 lb (51.7 kg)   SpO2 99%   BMI 18.97 kg/m      Physical Examination:  Constitutional:  Alert, well nourished, NAD.  HEENT: Normocephalic, atraumatic.   Pulmonary:  Without shortness of breath, normal effort.   Cardiovascular:  No pitting edema of BLE.      Neurological:  Awake  Alert  Oriented x 3  Speech clear  Cranial nerves II - XII grossly intact  PERRL  EOMI  Face symmetric  Tongue midline  Motor exam:  Iliopsoas  (hip flexion)               Right: 5/5  Left:  5/5  Quadriceps  (knee extension)       Right:  5/5  Left:  5/5  Hamstrings  (knee flexion)            Right:  5/5  Left:  5/5  Gastroc Soleus  (PF)                          Right:  5/5  Left:  5/5  Tibialis Ant  (DF)                          Right:  5/5  Left:  5/5  EHL                          Right:  5/5  Left:  5/5         Sensation intact     Reflexes are 2+ in the patellar and Achilles. There is no clonus.     Musculoskeletal:  Gait: Able to stand from a seated position. Normal non-antalgic, non-myelopathic gait.   No tenderness of the spine or paraspinous muscles.  Negative SI joint tenderness bilaterally.  Negative straight leg raise bilaterally.      Imaging:   XR THORACIC SPINE 2 VIEWS 6/20/2022 12:13 PM   IMPRESSION: Pathologic fracture of T11 with vertebral plana, anterior  wedging, and segmental kyphosis from T10 to T12. Vertebral body  heights otherwise preserved. Left upper lobe lung mass.    XR LUMBAR SPINE 2-3 VIEWS 6/20/2022 12:13 PM                                                                 IMPRESSION: T11 pathologic fracture with vertebral plana and segmental  kyphosis at T10 and T12. Vertebral body heights otherwise preserved.  Lucent and sclerotic changes at the L2 level consistent with osseous  metastases. Grade 1 anterolisthesis of L4 on L5. Lower lumbar  degenerative disc disease and facet osteoarthrosis. Bilateral  sacroiliac joint osteoporosis. Right hip  arthroplasty.    Assessment/Plan:   88 year old female with history of lung cancer who presents for 6 week follow-up of thoracic and lumbar osseous metastases and T11 pathologic compression deformity.     - We continue to recommend TLSO brace. Patient declines despite education on risk/benefits.  - Patient is interested in other possible pain control measures. Placed referral to [facility] for comprehensive care.  - Continue follow-up with Oncology   - Follow up with our clinic in 6 weeks with xray prior as scheduled     Advised patient to call our clinic with any questions or concerns. Discussed red flag symptoms and advised to seek medical attention if these develop. Patient voiced understanding and agreement.      Corina Roldan, CNP  [facility] Neurosurgery  [address] [address]  Tel [phone]  Pager [phone]          Again, thank you for allowing me to participate in the care of your patient.        Sincerely,        Corina Roldan, AMADOR

## 2022-06-20 NOTE — NURSING NOTE
"Gely Tam is a 88 year old female who presents for:  Chief Complaint   Patient presents with     RECHECK     6 wk follow-up   Spine Lesions        Initial Vitals:  BP 93/46   Pulse 72   Wt 114 lb (51.7 kg)   SpO2 99%   BMI 18.97 kg/m   Estimated body mass index is 18.97 kg/m  as calculated from the following:    Height as of 5/17/22: 5' 5\" (1.651 m).    Weight as of this encounter: 114 lb (51.7 kg).. Body surface area is 1.54 meters squared. BP completed using cuff size: regular  Severe Pain (7)      Jean Carlos Clarke MA    "

## 2022-06-22 ENCOUNTER — APPOINTMENT (OUTPATIENT)
Dept: URBAN - METROPOLITAN AREA CLINIC 255 | Age: 87
Setting detail: DERMATOLOGY
End: 2022-06-24

## 2022-06-22 DIAGNOSIS — D18.0 HEMANGIOMA: ICD-10-CM

## 2022-06-22 DIAGNOSIS — D22 MELANOCYTIC NEVI: ICD-10-CM

## 2022-06-22 DIAGNOSIS — R68.3 CLUBBING OF FINGERS: ICD-10-CM

## 2022-06-22 DIAGNOSIS — L82.1 OTHER SEBORRHEIC KERATOSIS: ICD-10-CM

## 2022-06-22 DIAGNOSIS — L81.4 OTHER MELANIN HYPERPIGMENTATION: ICD-10-CM

## 2022-06-22 PROBLEM — D22.5 MELANOCYTIC NEVI OF TRUNK: Status: ACTIVE | Noted: 2022-06-22

## 2022-06-22 PROBLEM — D18.01 HEMANGIOMA OF SKIN AND SUBCUTANEOUS TISSUE: Status: ACTIVE | Noted: 2022-06-22

## 2022-06-22 PROCEDURE — 99203 OFFICE O/P NEW LOW 30 MIN: CPT

## 2022-06-22 PROCEDURE — OTHER MIPS QUALITY: OTHER

## 2022-06-22 PROCEDURE — OTHER COUNSELING: OTHER

## 2022-06-22 ASSESSMENT — LOCATION DETAILED DESCRIPTION DERM
LOCATION DETAILED: PERIUNGUAL SKIN RIGHT MIDDLE FINGER
LOCATION DETAILED: RIGHT SMALL FINGERNAIL
LOCATION DETAILED: LEFT INDEX FINGERNAIL
LOCATION DETAILED: LEFT THUMBNAIL
LOCATION DETAILED: LEFT RING FINGER LUNULA
LOCATION DETAILED: LEFT SMALL FINGERNAIL
LOCATION DETAILED: LEFT SUPERIOR MEDIAL UPPER BACK
LOCATION DETAILED: RIGHT MEDIAL UPPER BACK
LOCATION DETAILED: RIGHT RING FINGERNAIL
LOCATION DETAILED: RIGHT INDEX FINGERNAIL
LOCATION DETAILED: RIGHT THUMBNAIL
LOCATION DETAILED: RIGHT INFERIOR MEDIAL UPPER BACK
LOCATION DETAILED: LEFT MIDDLE FINGERNAIL

## 2022-06-22 ASSESSMENT — LOCATION ZONE DERM
LOCATION ZONE: TRUNK
LOCATION ZONE: FINGERNAIL
LOCATION ZONE: FINGER

## 2022-06-22 ASSESSMENT — LOCATION SIMPLE DESCRIPTION DERM
LOCATION SIMPLE: LEFT THUMBNAIL
LOCATION SIMPLE: RIGHT SMALL FINGERNAIL
LOCATION SIMPLE: LEFT INDEX FINGERNAIL
LOCATION SIMPLE: RIGHT RING FINGERNAIL
LOCATION SIMPLE: LEFT SMALL FINGERNAIL
LOCATION SIMPLE: RIGHT UPPER BACK
LOCATION SIMPLE: LEFT UPPER BACK
LOCATION SIMPLE: RIGHT MIDDLE FINGER
LOCATION SIMPLE: RIGHT INDEX FINGERNAIL
LOCATION SIMPLE: LEFT RING FINGERNAIL
LOCATION SIMPLE: RIGHT THUMBNAIL
LOCATION SIMPLE: LEFT MIDDLE FINGERNAIL

## 2022-06-22 NOTE — PROCEDURE: MIPS QUALITY
Quality 431: Preventive Care And Screening: Unhealthy Alcohol Use - Screening: Patient not identified as an unhealthy alcohol user when screened for unhealthy alcohol use using a systematic screening method
Quality 111:Pneumonia Vaccination Status For Older Adults: Pneumococcal vaccine administered on or after patient’s 60th birthday and before the end of the measurement period
Quality 130: Documentation Of Current Medications In The Medical Record: Current Medications Documented
Quality 110: Preventive Care And Screening: Influenza Immunization: Influenza Immunization previously received during influenza season
Quality 226: Preventive Care And Screening: Tobacco Use: Screening And Cessation Intervention: Patient screened for tobacco use and is an ex/non-smoker
Detail Level: Detailed

## 2022-06-24 ENCOUNTER — TRANSFERRED RECORDS (OUTPATIENT)
Dept: HEALTH INFORMATION MANAGEMENT | Facility: CLINIC | Age: 87
End: 2022-06-24

## 2022-06-27 ENCOUNTER — APPOINTMENT (OUTPATIENT)
Dept: URBAN - METROPOLITAN AREA CLINIC 255 | Age: 87
Setting detail: DERMATOLOGY
End: 2022-07-04

## 2022-06-27 DIAGNOSIS — L27.0 GENERALIZED SKIN ERUPTION DUE TO DRUGS AND MEDICAMENTS TAKEN INTERNALLY: ICD-10-CM

## 2022-06-27 PROCEDURE — OTHER BIOPSY BY PUNCH METHOD: OTHER

## 2022-06-27 PROCEDURE — OTHER MIPS QUALITY: OTHER

## 2022-06-27 PROCEDURE — OTHER COUNSELING: OTHER

## 2022-06-27 PROCEDURE — 11104 PUNCH BX SKIN SINGLE LESION: CPT

## 2022-06-27 ASSESSMENT — LOCATION DETAILED DESCRIPTION DERM: LOCATION DETAILED: LEFT ANTERIOR LATERAL PROXIMAL THIGH

## 2022-06-27 ASSESSMENT — LOCATION ZONE DERM: LOCATION ZONE: LEG

## 2022-06-27 ASSESSMENT — LOCATION SIMPLE DESCRIPTION DERM: LOCATION SIMPLE: LEFT THIGH

## 2022-06-27 NOTE — PROCEDURE: MIPS QUALITY
Quality 111:Pneumonia Vaccination Status For Older Adults: Pneumococcal vaccine administered on or after patient’s 60th birthday and before the end of the measurement period
Quality 226: Preventive Care And Screening: Tobacco Use: Screening And Cessation Intervention: Patient screened for tobacco use and is an ex/non-smoker
Detail Level: Detailed
Quality 431: Preventive Care And Screening: Unhealthy Alcohol Use - Screening: Patient not identified as an unhealthy alcohol user when screened for unhealthy alcohol use using a systematic screening method
Quality 265: Biopsy Follow-Up: Biopsy results reviewed, communicated, tracked, and documented
Quality 130: Documentation Of Current Medications In The Medical Record: Current Medications Documented
Quality 110: Preventive Care And Screening: Influenza Immunization: Influenza Immunization previously received during influenza season

## 2022-06-27 NOTE — PROCEDURE: BIOPSY BY PUNCH METHOD
Path Notes (To The Dermatopathologist): Presented are two glass slides of frozen sections of the biopsy specimen.  Please review for diagnosis.
Billing Type: Third-Party Bill
Punch Size In Mm: 4
Detail Level: Detailed
Validate Anticipated Plan: No
Hemostasis: None
X Size Of Lesion In Cm (Optional): 0
Body Location Override (Optional - Billing Will Still Be Based On Selected Body Map Location If Applicable): L Lateral Thigh
Anesthesia Volume In Cc (Will Not Render If 0): 1
Suture Removal: 14 days
Post-Care Instructions: I reviewed with the patient in detail post-care instructions. Patient is to keep the biopsy site dry overnight, and then apply bacitracin twice daily until healed. Patient may apply hydrogen peroxide soaks to remove any crusting.
Validate Note Data (See Information Below): Yes
Anesthesia Type: 1% lidocaine with 1:100,000 epinephrine and a 1:3 solution of 8.4% sodium bicarbonate
Epidermal Sutures: 5-0 Surgipro
Biopsy Type: H and E
Notification Instructions: Patient will be notified of biopsy results. However, patient instructed to call the office if not contacted within 2 weeks.
Dressing: Band-Aid
Consent: Written consent was obtained and risks were reviewed including but not limited to scarring, infection, bleeding, scabbing, incomplete removal, nerve damage and allergy to anesthesia.
Home Suture Removal Text: Patient was provided a home suture removal kit and will remove their sutures at home.  If they have any questions or difficulties they will call the office.
Information: Selecting Yes will display possible errors in your note based on the variables you have selected. This validation is only offered as a suggestion for you. PLEASE NOTE THAT THE VALIDATION TEXT WILL BE REMOVED WHEN YOU FINALIZE YOUR NOTE. IF YOU WANT TO FAX A PRELIMINARY NOTE YOU WILL NEED TO TOGGLE THIS TO 'NO' IF YOU DO NOT WANT IT IN YOUR FAXED NOTE.
Wound Care: Petrolatum

## 2022-06-27 NOTE — HPI: OTHER
Condition:: Rash
Please Describe Your Condition:: is an established patient who is being seen for a chief complaint of Rash. The patient presents today with a rash located on her back, chest, abdomen, and upper thighs. She states the rash appeared on 6/24/2022. She reports recently starting new cancer medications (Dabrafenib and Trametinib) for metastatic lung adenocarcinoma, where a potential side affect includes rash. She denies pain (0/10) but reports itch (2/10). It has not been treated in the past.\\n\\nThe patient presents for further evaluation and management.

## 2022-06-27 NOTE — PROCEDURE: COUNSELING
Detail Level: Detailed
Patient Specific Counseling (Will Not Stick From Patient To Patient): Noted that both of her new cancer medications may cause rash as a side effect.  Since this is not too bothersome at this point (and in fact may be lessening), recommended a diagnostic biopsy to confirm this diagnosis.  With this information, we will treat her in future in order to maintain her on her cancer medications.

## 2022-07-08 ENCOUNTER — TRANSFERRED RECORDS (OUTPATIENT)
Dept: HEALTH INFORMATION MANAGEMENT | Facility: CLINIC | Age: 87
End: 2022-07-08

## 2022-07-08 ENCOUNTER — MEDICAL CORRESPONDENCE (OUTPATIENT)
Dept: HEALTH INFORMATION MANAGEMENT | Facility: CLINIC | Age: 87
End: 2022-07-08

## 2022-07-11 ENCOUNTER — APPOINTMENT (OUTPATIENT)
Dept: URBAN - METROPOLITAN AREA CLINIC 255 | Age: 87
Setting detail: DERMATOLOGY
End: 2022-07-12

## 2022-07-11 DIAGNOSIS — L27.0 GENERALIZED SKIN ERUPTION DUE TO DRUGS AND MEDICAMENTS TAKEN INTERNALLY: ICD-10-CM

## 2022-07-11 DIAGNOSIS — Z48.02 ENCOUNTER FOR REMOVAL OF SUTURES: ICD-10-CM

## 2022-07-11 DIAGNOSIS — L82.1 OTHER SEBORRHEIC KERATOSIS: ICD-10-CM

## 2022-07-11 PROCEDURE — OTHER DIAGNOSIS COMMENT: OTHER

## 2022-07-11 PROCEDURE — OTHER OBSERVATION: OTHER

## 2022-07-11 PROCEDURE — OTHER MIPS QUALITY: OTHER

## 2022-07-11 PROCEDURE — OTHER COUNSELING: OTHER

## 2022-07-11 PROCEDURE — OTHER SUTURE REMOVAL (GLOBAL PERIOD): OTHER

## 2022-07-11 PROCEDURE — OTHER PATIENT SPECIFIC COUNSELING: OTHER

## 2022-07-11 PROCEDURE — 99213 OFFICE O/P EST LOW 20 MIN: CPT

## 2022-07-11 ASSESSMENT — LOCATION ZONE DERM
LOCATION ZONE: LEG
LOCATION ZONE: FACE

## 2022-07-11 ASSESSMENT — PAIN INTENSITY VAS: HOW INTENSE IS YOUR PAIN 0 BEING NO PAIN, 10 BEING THE MOST SEVERE PAIN POSSIBLE?: NO PAIN

## 2022-07-11 ASSESSMENT — LOCATION SIMPLE DESCRIPTION DERM
LOCATION SIMPLE: RIGHT FOREHEAD
LOCATION SIMPLE: LEFT THIGH

## 2022-07-11 ASSESSMENT — LOCATION DETAILED DESCRIPTION DERM
LOCATION DETAILED: RIGHT INFERIOR LATERAL FOREHEAD
LOCATION DETAILED: LEFT ANTERIOR PROXIMAL THIGH

## 2022-07-11 ASSESSMENT — SEVERITY ASSESSMENT: SEVERITY: CLEAR

## 2022-07-11 NOTE — PROCEDURE: PATIENT SPECIFIC COUNSELING
Detail Level: Zone
Noted that this eruption was caused by trametinib, dabrafenib, or both.  She reports that she will resume treatment soon and at a lower dose.  I informed her that the rash may still recur.  If it does, she should contact us to be seen for management.

## 2022-07-11 NOTE — PROCEDURE: SUTURE REMOVAL (GLOBAL PERIOD)
Add 19219 Cpt? (Important Note: In 2017 The Use Of 78706 Is Being Tracked By Cms To Determine Future Global Period Reimbursement For Global Periods): no
Detail Level: Detailed

## 2022-07-11 NOTE — PROCEDURE: MIPS QUALITY
Detail Level: Detailed
Quality 130: Documentation Of Current Medications In The Medical Record: Current Medications Documented
Quality 110: Preventive Care And Screening: Influenza Immunization: Influenza Immunization previously received during influenza season
Quality 431: Preventive Care And Screening: Unhealthy Alcohol Use - Screening: Patient not identified as an unhealthy alcohol user when screened for unhealthy alcohol use using a systematic screening method
Quality 111:Pneumonia Vaccination Status For Older Adults: Pneumococcal vaccine administered on or after patient’s 60th birthday and before the end of the measurement period
Quality 226: Preventive Care And Screening: Tobacco Use: Screening And Cessation Intervention: Patient screened for tobacco use and is an ex/non-smoker

## 2022-07-12 DIAGNOSIS — R09.89 DEPRESSED LEFT VENTRICULAR EJECTION FRACTION: Primary | ICD-10-CM

## 2022-07-12 DIAGNOSIS — Z51.81 MEDICATION MONITORING ENCOUNTER: ICD-10-CM

## 2022-08-01 ENCOUNTER — OFFICE VISIT (OUTPATIENT)
Dept: NEUROSURGERY | Facility: CLINIC | Age: 87
End: 2022-08-01
Payer: MEDICARE

## 2022-08-01 VITALS
SYSTOLIC BLOOD PRESSURE: 137 MMHG | DIASTOLIC BLOOD PRESSURE: 72 MMHG | BODY MASS INDEX: 20.24 KG/M2 | HEART RATE: 69 BPM | OXYGEN SATURATION: 100 % | WEIGHT: 121.6 LBS

## 2022-08-01 DIAGNOSIS — C79.51 METASTASIS TO SPINAL COLUMN (H): Primary | ICD-10-CM

## 2022-08-01 PROCEDURE — 99213 OFFICE O/P EST LOW 20 MIN: CPT | Performed by: NURSE PRACTITIONER

## 2022-08-01 ASSESSMENT — PAIN SCALES - GENERAL: PAINLEVEL: NO PAIN (0)

## 2022-08-01 NOTE — PROGRESS NOTES
Dr. Reagan Chow   [facility] Neurosurgery Clinic Visit    Primary Care Provider: Vel Calvert    Reason For Visit:   Hospital follow-up of thoracic and lumbar osseous metastases and T11 pathologic compression deformity      HPI: Gely Tam is an 88 year old female with history of lung cancer who presents for 6 week follow-up of thoracic and lumbar osseous metastases and T11 pathologic compression deformity. Patient was admitted on 5/4/22, imaging showed osseous metastases to nearly completely replace the L2 vertebral body, anterior aspect of the T10 vertebral body, the posterior aspect of the T12 vertebral body, and T11 vertebral body with 70-80% pathologic compression deformity and retropulsion, no high-grade spinal canal or neural foraminal stenosis. Neurosurgery was consulted and recommended TLSO brace with outpatient follow-up.  Today, patient reports feeling better with each day. She reports moderate mid to low back pain that mostly occurs with standing and prolonged walking. Denies radicular leg pain, paresthesias, or weakness. She has not been wearing TLSO brace despite education on the risk/benefits. Patient uses heat packs and Tylenol at bedtime for pain control. Denies any falls, foot drop, saddle anesthesia, or bladder/bowel incontinence. She continues to follow with oncologist Dr. Florence for Zometa infusions.     8/1/22: Patient presents for follow-up of thoracic and lumbar osseous metastases and T11 pathologic compression deformity. She reports intermittent 3/10 low back pain that mostly occurs with activity. She uses Tylenol and heat packs as needed for pain control. Denies radicular leg pain, paresthesias, or weakness. Denies any falls, foot drop, saddle anesthesia, or bladder/bowel incontinence. She was previously referred for TLSO brace and evaluation with [facility], but she is not interested in these appointments at this time. She states her back pain is  mild and manageable.     Past Medical History:   Diagnosis Date     Arthritis      HTN (hypertension)      Hyperlipemia        Past Medical History reviewed with patient during visit.    Past Surgical History:   Procedure Laterality Date     ARTHROPLASTY HIP  10/26/2012    Procedure: ARTHROPLASTY HIP;  RIGHT TOTAL HIP ARTHROPLASTY (J&J)^;  Surgeon: Diana Mcclellan MD;  Location: [facility] OR     ARTHROPLASTY KNEE Left 2018    Procedure: ARTHROPLASTY KNEE;  LEFT TOTAL KNEE ARTHROPLASTY ;  Surgeon: Diana Mcclellan MD;  Location: [facility] OR     GYN SURGERY       HYSTERECTOMY      left ovary remains     LAPAROSCOPIC SALPINGOTOMY      bilat,. benign      ORTHOPEDIC SURGERY  2010    right tka     SOFT TISSUE SURGERY   vein ligation     Past Surgical History reviewed with patient during visit.    Current Outpatient Medications   Medication     acetaminophen (TYLENOL) 325 MG tablet     AMOXICILLIN PO     apixaban ANTICOAGULANT (ELIQUIS) 2.5 MG tablet     atorvastatin (LIPITOR) 10 MG tablet     calcium carbonate 600 mg-vitamin D 400 units (CALTRATE) 600-400 MG-UNIT per tablet     digoxin (LANOXIN) 125 MCG tablet     furosemide (LASIX) 20 MG tablet     metoprolol succinate ER (TOPROL XL) 25 MG 24 hr tablet     potassium chloride ER (KLOR-CON M) 10 MEQ CR tablet     No current facility-administered medications for this visit.       No Known Allergies    Social History     Socioeconomic History     Marital status:      Spouse name: Dusty     Number of children: 4   Occupational History     Occupation: Unknown     Employer: UNKNOWN   Tobacco Use     Smoking status: Former Smoker     Packs/day: 1.00     Years: 30.00     Pack years: 30.00     Quit date: 1995     Years since quittin.4     Smokeless tobacco: Never Used   Substance and Sexual Activity     Alcohol use: Yes     Alcohol/week: 0.0 standard drinks     Comment: 1/day   wine     Drug use: No     Sexual activity: Never     Partners: Male      Birth control/protection: Post-menopausal   Social History Narrative    No advanced care directives on file.     - Dusty       Family History   Problem Relation Age of Onset     Breast Cancer Other         aunt       ROS: 10 point ROS neg other than the symptoms noted above in the HPI.    Vital Signs: /72   Pulse 69   Wt 121 lb 9.6 oz (55.2 kg)   SpO2 100%   BMI 20.24 kg/m      Neurological Physical Examination:  Awake  Alert  Oriented x 3  Speech clear  Cranial nerves II - XII grossly intact  PERRL  EOMI  Face symmetric  Tongue midline  Motor exam:  Iliopsoas  (hip flexion)               Right: 5/5  Left:  5/5  Quadriceps  (knee extension)       Right:  5/5  Left:  5/5  Hamstrings  (knee flexion)            Right:  5/5  Left:  5/5  Gastroc Soleus  (PF)                          Right:  5/5  Left:  5/5  Tibialis Ant  (DF)                          Right:  5/5  Left:  5/5  EHL                          Right:  5/5  Left:  5/5         Sensation intact     Reflexes are 2+ in the patellar and Achilles. There is no clonus.     Musculoskeletal:  Gait: Able to stand from a seated position. Normal non-antalgic, non-myelopathic gait.   No tenderness of the spine or paraspinous muscles.  Negative SI joint tenderness bilaterally.  Negative straight leg raise bilaterally.      Imaging:   XR THORACIC SPINE 2 VIEWS 8/1/2022 1:18 PM  IMPRESSION:  1.Redemonstration of severe pathologic compression fracture of T11  with greater than 80% vertebral body height loss. The associated  segmental kyphosis centered at T11 is not significantly changed. 2.  Small sclerotic lesion involving the posterior T12 vertebral body and  the larger sclerotic lesion at L2.   3. The small probable metastasis involving the T10 vertebral body seen  on the prior thoracic spine MRI is not well seen on this radiograph.      XR LUMBAR SPINE 2/3 VIEWS 8/1/2022 1:18 PM  IMPRESSION: Overall stable compared to lumbar spine radiographs  06/20/2022.  Redemonstration of severe pathologic compression fracture  of T11. No significant change in the segmental kyphosis centered at  T11. The rest of the fetal body heights are maintained. Stable  sclerotic lesion involving L2.    Assessment/Plan:   88 year old female with history of lung cancer who presents for 3 month follow-up of thoracic and lumbar osseous metastases and T11 pathologic compression deformity. Imaging today is stable, reviewed with patient and family. Patient declined TLSO brace and referral to [facility]. She feels her back pain is mild and manageable. She plans to continue follow-up with her Oncologist. Advised patient to follow-up with our clinic as needed or with any new/worsening symptoms.     Advised patient to call our clinic with any questions or concerns. Discussed red flag symptoms and advised to seek medical attention if these develop. Patient voiced understanding and agreement.      Corina Roldan [facility] Neurosurgery  [address] [address]  Tel [phone]  Pager [phone]

## 2022-08-01 NOTE — LETTER
8/1/2022         RE: Gely Tam  [address] [address]        Dear Colleague,    Thank you for referring your patient, Gely Tam, to the [facility] NEUROLOGY CLINICS [facility]. Please see a copy of my visit note below.    Dr. Reagan Chow   [facility] Neurosurgery Clinic Visit    Primary Care Provider: Vel Calvert    Reason For Visit:   Hospital follow-up of thoracic and lumbar osseous metastases and T11 pathologic compression deformity      HPI: Gely Tam is an 88 year old female with history of lung cancer who presents for 6 week follow-up of thoracic and lumbar osseous metastases and T11 pathologic compression deformity. Patient was admitted on 5/4/22, imaging showed osseous metastases to nearly completely replace the L2 vertebral body, anterior aspect of the T10 vertebral body, the posterior aspect of the T12 vertebral body, and T11 vertebral body with 70-80% pathologic compression deformity and retropulsion, no high-grade spinal canal or neural foraminal stenosis. Neurosurgery was consulted and recommended TLSO brace with outpatient follow-up.  Today, patient reports feeling better with each day. She reports moderate mid to low back pain that mostly occurs with standing and prolonged walking. Denies radicular leg pain, paresthesias, or weakness. She has not been wearing TLSO brace despite education on the risk/benefits. Patient uses heat packs and Tylenol at bedtime for pain control. Denies any falls, foot drop, saddle anesthesia, or bladder/bowel incontinence. She continues to follow with oncologist Dr. Florence for Zometa infusions.     8/1/22: Patient presents for follow-up of thoracic and lumbar osseous metastases and T11 pathologic compression deformity. She reports intermittent 3/10 low back pain that mostly occurs with activity. She uses Tylenol and heat packs as needed for pain control. Denies radicular leg pain, paresthesias, or weakness. Denies any  falls, foot drop, saddle anesthesia, or bladder/bowel incontinence. She was previously referred for TLSO brace and evaluation with [facility], but she is not interested in these appointments at this time. She states her back pain is mild and manageable.     Past Medical History:   Diagnosis Date     Arthritis      HTN (hypertension)      Hyperlipemia        Past Medical History reviewed with patient during visit.    Past Surgical History:   Procedure Laterality Date     ARTHROPLASTY HIP  10/26/2012    Procedure: ARTHROPLASTY HIP;  RIGHT TOTAL HIP ARTHROPLASTY (J&J)^;  Surgeon: Diana Mcclellan MD;  Location: [facility]     ARTHROPLASTY KNEE Left 1/30/2018    Procedure: ARTHROPLASTY KNEE;  LEFT TOTAL KNEE ARTHROPLASTY ;  Surgeon: Diana Mcclellan MD;  Location: [facility]     GYN SURGERY  1981     HYSTERECTOMY  1981    left ovary remains     LAPAROSCOPIC SALPINGOTOMY      bilat,. benign      ORTHOPEDIC SURGERY  2010    right tka     SOFT TISSUE SURGERY  1984 vein ligation     Past Surgical History reviewed with patient during visit.    Current Outpatient Medications   Medication     acetaminophen (TYLENOL) 325 MG tablet     AMOXICILLIN PO     apixaban ANTICOAGULANT (ELIQUIS) 2.5 MG tablet     atorvastatin (LIPITOR) 10 MG tablet     calcium carbonate 600 mg-vitamin D 400 units (CALTRATE) 600-400 MG-UNIT per tablet     digoxin (LANOXIN) 125 MCG tablet     furosemide (LASIX) 20 MG tablet     metoprolol succinate ER (TOPROL XL) 25 MG 24 hr tablet     potassium chloride ER (KLOR-CON M) 10 MEQ CR tablet     No current facility-administered medications for this visit.       No Known Allergies    Social History     Socioeconomic History     Marital status:      Spouse name: Dusty     Number of children: 4   Occupational History     Occupation: Unknown     Employer: UNKNOWN   Tobacco Use     Smoking status: Former Smoker     Packs/day: 1.00     Years: 30.00     Pack years: 30.00     Quit date: 1/30/1995      Years since quittin.4     Smokeless tobacco: Never Used   Substance and Sexual Activity     Alcohol use: Yes     Alcohol/week: 0.0 standard drinks     Comment: 1/day   wine     Drug use: No     Sexual activity: Never     Partners: Male     Birth control/protection: Post-menopausal   Social History Narrative    No advanced care directives on file.     - Dusty       Family History   Problem Relation Age of Onset     Breast Cancer Other         aunt       ROS: 10 point ROS neg other than the symptoms noted above in the HPI.    Vital Signs: /72   Pulse 69   Wt 121 lb 9.6 oz (55.2 kg)   SpO2 100%   BMI 20.24 kg/m      Neurological Physical Examination:  Awake  Alert  Oriented x 3  Speech clear  Cranial nerves II - XII grossly intact  PERRL  EOMI  Face symmetric  Tongue midline  Motor exam:  Iliopsoas  (hip flexion)               Right: 5/5  Left:  5/5  Quadriceps  (knee extension)       Right:  5/5  Left:  5/5  Hamstrings  (knee flexion)            Right:  5/5  Left:  5/5  Gastroc Soleus  (PF)                          Right:  5/5  Left:  5/5  Tibialis Ant  (DF)                          Right:  5/5  Left:  5/5  EHL                          Right:  5/5  Left:  5/5         Sensation intact     Reflexes are 2+ in the patellar and Achilles. There is no clonus.     Musculoskeletal:  Gait: Able to stand from a seated position. Normal non-antalgic, non-myelopathic gait.   No tenderness of the spine or paraspinous muscles.  Negative SI joint tenderness bilaterally.  Negative straight leg raise bilaterally.      Imaging:   XR THORACIC SPINE 2 VIEWS 2022 1:18 PM  IMPRESSION:  1.Redemonstration of severe pathologic compression fracture of T11  with greater than 80% vertebral body height loss. The associated  segmental kyphosis centered at T11 is not significantly changed. 2.  Small sclerotic lesion involving the posterior T12 vertebral body and  the larger sclerotic lesion at L2.   3. The small  probable metastasis involving the T10 vertebral body seen  on the prior thoracic spine MRI is not well seen on this radiograph.      XR LUMBAR SPINE 2/3 VIEWS 8/1/2022 1:18 PM  IMPRESSION: Overall stable compared to lumbar spine radiographs  06/20/2022. Redemonstration of severe pathologic compression fracture  of T11. No significant change in the segmental kyphosis centered at  T11. The rest of the fetal body heights are maintained. Stable  sclerotic lesion involving L2.    Assessment/Plan:   88 year old female with history of lung cancer who presents for 3 month follow-up of thoracic and lumbar osseous metastases and T11 pathologic compression deformity. Imaging today is stable, reviewed with patient and family. Patient declined TLSO brace and referral to [facility]. She feels her back pain is mild and manageable. She plans to continue follow-up with her Oncologist. Advised patient to follow-up with our clinic as needed or with any new/worsening symptoms.     Advised patient to call our clinic with any questions or concerns. Discussed red flag symptoms and advised to seek medical attention if these develop. Patient voiced understanding and agreement.      Corina Roldan CNP  [facility] Neurosurgery  [address] [address]  Tel [phone]  Pager [phone]          Again, thank you for allowing me to participate in the care of your patient.        Sincerely,        Corina Roldan NP

## 2022-08-01 NOTE — NURSING NOTE
"Gely Tam is a 88 year old female who presents for:  Chief Complaint   Patient presents with     Surgical Followup     12 wk post op follow-up   Spine lesions        Initial Vitals:  /72   Pulse 69   Wt 121 lb 9.6 oz (55.2 kg)   SpO2 100%   BMI 20.24 kg/m   Estimated body mass index is 20.24 kg/m  as calculated from the following:    Height as of 5/17/22: 5' 5\" (1.651 m).    Weight as of this encounter: 121 lb 9.6 oz (55.2 kg).. Body surface area is 1.59 meters squared. BP completed using cuff size: regular  No Pain (0)    Jean Carlos Clarke  "

## 2022-08-04 ENCOUNTER — HOSPITAL ENCOUNTER (OUTPATIENT)
Dept: CARDIOLOGY | Facility: CLINIC | Age: 87
Discharge: HOME | End: 2022-08-04
Attending: INTERNAL MEDICINE | Admitting: INTERNAL MEDICINE
Payer: MEDICARE

## 2022-08-04 DIAGNOSIS — Z51.81 MEDICATION MONITORING ENCOUNTER: ICD-10-CM

## 2022-08-04 DIAGNOSIS — R09.89 DEPRESSED LEFT VENTRICULAR EJECTION FRACTION: ICD-10-CM

## 2022-08-04 LAB — LVEF ECHO: NORMAL

## 2022-08-04 PROCEDURE — 93306 TTE W/DOPPLER COMPLETE: CPT | Mod: 26 | Performed by: INTERNAL MEDICINE

## 2022-08-04 PROCEDURE — 93306 TTE W/DOPPLER COMPLETE: CPT

## 2022-08-10 ENCOUNTER — TRANSFERRED RECORDS (OUTPATIENT)
Dept: HEALTH INFORMATION MANAGEMENT | Facility: CLINIC | Age: 87
End: 2022-08-10

## 2022-09-07 ENCOUNTER — ANCILLARY PROCEDURE (OUTPATIENT)
Dept: CT IMAGING | Facility: CLINIC | Age: 87
End: 2022-09-07
Attending: INTERNAL MEDICINE
Payer: MEDICARE

## 2022-09-07 DIAGNOSIS — C79.51 BONE METASTASIS: ICD-10-CM

## 2022-09-07 LAB
CREAT BLD-MCNC: 0.7 MG/DL (ref 0.5–1)
GFR SERPL CREATININE-BSD FRML MDRD: >60 ML/MIN/1.73M2

## 2022-09-07 PROCEDURE — 255N000002 HC RX 255 OP 636: Performed by: INTERNAL MEDICINE

## 2022-09-07 PROCEDURE — 74177 CT ABD & PELVIS W/CONTRAST: CPT

## 2022-09-07 PROCEDURE — 82565 ASSAY OF CREATININE: CPT

## 2022-09-07 RX ADMIN — IOHEXOL 75 ML: 350 INJECTION, SOLUTION INTRAVENOUS at 10:54

## 2022-09-13 ENCOUNTER — TRANSFERRED RECORDS (OUTPATIENT)
Dept: HEALTH INFORMATION MANAGEMENT | Facility: CLINIC | Age: 87
End: 2022-09-13

## 2022-09-18 ENCOUNTER — TELEPHONE (OUTPATIENT)
Dept: NEUROSURGERY | Facility: CLINIC | Age: 87
End: 2022-09-18

## 2022-09-18 NOTE — TELEPHONE ENCOUNTER
New referral received, need to schedule patient for follow up appointment.      Appt note:    Referred back to clinic by [facility]ology. Follow up regarding low back pain related to Spinal fracture. Discuss possible Kyphoplasty/Vertebroplasty. Last seen in clinic 08/01/2022. Imaging in chart. Nothing since last office visit. New referral and office notes from [facility] scanned into patients chart.

## 2022-09-26 ENCOUNTER — TRANSFERRED RECORDS (OUTPATIENT)
Dept: HEALTH INFORMATION MANAGEMENT | Facility: CLINIC | Age: 87
End: 2022-09-26

## 2022-09-30 ENCOUNTER — OFFICE VISIT (OUTPATIENT)
Dept: NEUROSURGERY | Facility: CLINIC | Age: 87
End: 2022-09-30
Payer: MEDICARE

## 2022-09-30 VITALS — DIASTOLIC BLOOD PRESSURE: 65 MMHG | SYSTOLIC BLOOD PRESSURE: 108 MMHG | HEART RATE: 66 BPM | OXYGEN SATURATION: 98 %

## 2022-09-30 DIAGNOSIS — C79.51 METASTASIS TO SPINAL COLUMN (H): Primary | ICD-10-CM

## 2022-09-30 PROCEDURE — 99213 OFFICE O/P EST LOW 20 MIN: CPT | Performed by: NURSE PRACTITIONER

## 2022-09-30 NOTE — LETTER
9/30/2022         RE: Gely Tam  [address] [address]        Dear Colleague,    Thank you for referring your patient, Gely Tam, to the [facility] NEUROLOGY CLINICS [facility]. Please see a copy of my visit note below.    Dr. Reagan Chow   [facility] Neurosurgery Clinic Visit    Primary Care Provider: Vel Calvert    Reason For Visit:   Follow up       HPI: Gely Tam is an 88 year old female with history of lung cancer who presents for 6 week follow-up of thoracic and lumbar osseous metastases and T11 pathologic compression deformity. Patient was admitted on 5/4/22, imaging showed osseous metastases to nearly completely replace the L2 vertebral body, anterior aspect of the T10 vertebral body, the posterior aspect of the T12 vertebral body, and T11 vertebral body with 70-80% pathologic compression deformity and retropulsion, no high-grade spinal canal or neural foraminal stenosis. Neurosurgery was consulted and recommended TLSO brace with outpatient follow-up.  Today, patient reports feeling better with each day. She reports moderate mid to low back pain that mostly occurs with standing and prolonged walking. Denies radicular leg pain, paresthesias, or weakness. She has not been wearing TLSO brace despite education on the risk/benefits. Patient uses heat packs and Tylenol at bedtime for pain control. Denies any falls, foot drop, saddle anesthesia, or bladder/bowel incontinence. She continues to follow with oncologist Dr. Florence for Zometa infusions.     8/1/22: Patient presents for follow-up of thoracic and lumbar osseous metastases and T11 pathologic compression deformity. She reports intermittent 3/10 low back pain that mostly occurs with activity. She uses Tylenol and heat packs as needed for pain control. Denies radicular leg pain, paresthesias, or weakness. Denies any falls, foot drop, saddle anesthesia, or bladder/bowel incontinence. She was previously  referred for TLSO brace and evaluation with [facility], but she is not interested in these appointments at this time. She states her back pain is mild and manageable.     9/30/22: Patient presents for follow-up. She reports left sided low back pain that occurs in the mornings and improves after 2 hours with Tylenol. She denies any new or worsening symptoms.     Past Medical History:   Diagnosis Date     Arthritis      HTN (hypertension)      Hyperlipemia        Past Medical History reviewed with patient during visit.    Past Surgical History:   Procedure Laterality Date     ARTHROPLASTY HIP  10/26/2012    Procedure: ARTHROPLASTY HIP;  RIGHT TOTAL HIP ARTHROPLASTY (J&J)^;  Surgeon: Diana Mcclellan MD;  Location: [facility]     ARTHROPLASTY KNEE Left 1/30/2018    Procedure: ARTHROPLASTY KNEE;  LEFT TOTAL KNEE ARTHROPLASTY ;  Surgeon: Diana Mcclellan MD;  Location:  OR     GYN SURGERY  1981     HYSTERECTOMY  1981    left ovary remains     LAPAROSCOPIC SALPINGOTOMY      bilat,. benign      ORTHOPEDIC SURGERY  2010    right tka     SOFT TISSUE SURGERY  1984 vein ligation     Past Surgical History reviewed with patient during visit.    Current Outpatient Medications   Medication     acetaminophen (TYLENOL) 325 MG tablet     AMOXICILLIN PO     apixaban ANTICOAGULANT (ELIQUIS) 2.5 MG tablet     atorvastatin (LIPITOR) 10 MG tablet     calcium carbonate 600 mg-vitamin D 400 units (CALTRATE) 600-400 MG-UNIT per tablet     digoxin (LANOXIN) 125 MCG tablet     furosemide (LASIX) 20 MG tablet     metoprolol succinate ER (TOPROL XL) 25 MG 24 hr tablet     potassium chloride ER (KLOR-CON M) 10 MEQ CR tablet     No current facility-administered medications for this visit.       No Known Allergies    Social History     Socioeconomic History     Marital status:      Spouse name: Dusty     Number of children: 4   Occupational History     Occupation: Unknown     Employer: UNKNOWN   Tobacco Use     Smoking  status: Former Smoker     Packs/day: 1.00     Years: 30.00     Pack years: 30.00     Quit date: 1995     Years since quittin.4     Smokeless tobacco: Never Used   Substance and Sexual Activity     Alcohol use: Yes     Alcohol/week: 0.0 standard drinks     Comment: 1/day   wine     Drug use: No     Sexual activity: Never     Partners: Male     Birth control/protection: Post-menopausal   Social History Narrative    No advanced care directives on file.     - Dusty       Family History   Problem Relation Age of Onset     Breast Cancer Other         aunt       ROS: 10 point ROS neg other than the symptoms noted above in the HPI.    Vital Signs: /65 (BP Location: Right arm, Patient Position: Sitting, Cuff Size: Adult Small)   Pulse 66   SpO2 98%     Neurological Physical Examination:  Awake  Alert  Oriented x 3  Speech clear  Cranial nerves II - XII grossly intact  PERRL  EOMI  Face symmetric  Tongue midline  Motor exam:  Iliopsoas  (hip flexion)               Right: 5/5  Left:  5/5  Quadriceps  (knee extension)       Right:  5/5  Left:  5/5  Hamstrings  (knee flexion)            Right:  5/5  Left:  5/5  Gastroc Soleus  (PF)                          Right:  5/5  Left:  5/5  Tibialis Ant  (DF)                          Right:  5/5  Left:  5/5  EHL                          Right:  5/5  Left:  5/5         Sensation intact     Reflexes are 2+ in the patellar and Achilles. There is no clonus.     Musculoskeletal:  Gait: Able to stand from a seated position. Normal non-antalgic, non-myelopathic gait.   No tenderness of the spine or paraspinous muscles.  Negative SI joint tenderness bilaterally.  Negative straight leg raise bilaterally.      Imaging:   XR THORACIC SPINE 2 VIEWS 2022 1:18 PM  IMPRESSION:  1.Redemonstration of severe pathologic compression fracture of T11  with greater than 80% vertebral body height loss. The associated  segmental kyphosis centered at T11 is not significantly changed.  2.  Small sclerotic lesion involving the posterior T12 vertebral body and  the larger sclerotic lesion at L2.   3. The small probable metastasis involving the T10 vertebral body seen  on the prior thoracic spine MRI is not well seen on this radiograph.      XR LUMBAR SPINE 2/3 VIEWS 8/1/2022 1:18 PM  IMPRESSION: Overall stable compared to lumbar spine radiographs  06/20/2022. Redemonstration of severe pathologic compression fracture  of T11. No significant change in the segmental kyphosis centered at  T11. The rest of the fetal body heights are maintained. Stable  sclerotic lesion involving L2.    Assessment/Plan:   88 year old female with history of lung cancer, thoracic and lumbar osseous metastases, and T11 pathologic compression deformity. Patient presents for follow-up of left sided low back pain. Patient declined TLSO brace in the past. We discussed options at this time. Patient is not interested in TLSO brace or surgical intervention. She was previously referred to [facility] but has not scheduled with them yet. She plans to schedule an appt with their clinic to discuss comprehensive services. Recommend to continue follow-up with oncologist as well. Advised patient to follow-up with our clinic as needed or with any new/worsening symptoms.     Advised patient to call our clinic with any questions or concerns. Discussed red flag symptoms and advised to seek medical attention if these develop. Patient voiced understanding and agreement.      Corina Roldan, CNP  [facility]surgery  [address] [address]  Tel [phone]  Pager [phone]        Gely Tam is a 88 year old female who presents for:  Chief Complaint   Patient presents with     Follow Up     For past spinal fracture         Initial Vitals:  /65 (BP Location: Right arm, Patient Position: Sitting, Cuff Size: Adult Small)   Pulse 66   SpO2  "98%  Estimated body mass index is 20.24 kg/m  as calculated from the following:    Height as of 5/17/22: 5' 5\" (1.651 m).    Weight as of 8/1/22: 121 lb 9.6 oz (55.2 kg).. There is no height or weight on file to calculate BSA. BP completed using cuff size: small regular        Kalani Corona      Again, thank you for allowing me to participate in the care of your patient.        Sincerely,        Corina Roldan NP    "

## 2022-09-30 NOTE — PROGRESS NOTES
"Gely Tam is a 88 year old female who presents for:  Chief Complaint   Patient presents with     Follow Up     For past spinal fracture         Initial Vitals:  /65 (BP Location: Right arm, Patient Position: Sitting, Cuff Size: Adult Small)   Pulse 66   SpO2 98%  Estimated body mass index is 20.24 kg/m  as calculated from the following:    Height as of 5/17/22: 5' 5\" (1.651 m).    Weight as of 8/1/22: 121 lb 9.6 oz (55.2 kg).. There is no height or weight on file to calculate BSA. BP completed using cuff size: small richard Corona  "

## 2022-09-30 NOTE — PROGRESS NOTES
Dr. Reagan Chow   [facility] Neurosurgery Clinic Visit    Primary Care Provider: Vel Calvert    Reason For Visit:   Follow up       HPI: Gely Tam is an 88 year old female with history of lung cancer who presents for 6 week follow-up of thoracic and lumbar osseous metastases and T11 pathologic compression deformity. Patient was admitted on 5/4/22, imaging showed osseous metastases to nearly completely replace the L2 vertebral body, anterior aspect of the T10 vertebral body, the posterior aspect of the T12 vertebral body, and T11 vertebral body with 70-80% pathologic compression deformity and retropulsion, no high-grade spinal canal or neural foraminal stenosis. Neurosurgery was consulted and recommended TLSO brace with outpatient follow-up.  Today, patient reports feeling better with each day. She reports moderate mid to low back pain that mostly occurs with standing and prolonged walking. Denies radicular leg pain, paresthesias, or weakness. She has not been wearing TLSO brace despite education on the risk/benefits. Patient uses heat packs and Tylenol at bedtime for pain control. Denies any falls, foot drop, saddle anesthesia, or bladder/bowel incontinence. She continues to follow with oncologist Dr. Florence for Zometa infusions.     8/1/22: Patient presents for follow-up of thoracic and lumbar osseous metastases and T11 pathologic compression deformity. She reports intermittent 3/10 low back pain that mostly occurs with activity. She uses Tylenol and heat packs as needed for pain control. Denies radicular leg pain, paresthesias, or weakness. Denies any falls, foot drop, saddle anesthesia, or bladder/bowel incontinence. She was previously referred for TLSO brace and evaluation with [facility], but she is not interested in these appointments at this time. She states her back pain is mild and manageable.     9/30/22: Patient presents for follow-up. She reports left sided  low back pain that occurs in the mornings and improves after 2 hours with Tylenol. She denies any new or worsening symptoms.     Past Medical History:   Diagnosis Date     Arthritis      HTN (hypertension)      Hyperlipemia        Past Medical History reviewed with patient during visit.    Past Surgical History:   Procedure Laterality Date     ARTHROPLASTY HIP  10/26/2012    Procedure: ARTHROPLASTY HIP;  RIGHT TOTAL HIP ARTHROPLASTY (J&J)^;  Surgeon: Diana Mcclellan MD;  Location: [facility] OR     ARTHROPLASTY KNEE Left 2018    Procedure: ARTHROPLASTY KNEE;  LEFT TOTAL KNEE ARTHROPLASTY ;  Surgeon: Diana Mcclellan MD;  Location: [facility] OR     GYN SURGERY       HYSTERECTOMY      left ovary remains     LAPAROSCOPIC SALPINGOTOMY      bilat,. benign      ORTHOPEDIC SURGERY      right tka     SOFT TISSUE SURGERY   vein ligation     Past Surgical History reviewed with patient during visit.    Current Outpatient Medications   Medication     acetaminophen (TYLENOL) 325 MG tablet     AMOXICILLIN PO     apixaban ANTICOAGULANT (ELIQUIS) 2.5 MG tablet     atorvastatin (LIPITOR) 10 MG tablet     calcium carbonate 600 mg-vitamin D 400 units (CALTRATE) 600-400 MG-UNIT per tablet     digoxin (LANOXIN) 125 MCG tablet     furosemide (LASIX) 20 MG tablet     metoprolol succinate ER (TOPROL XL) 25 MG 24 hr tablet     potassium chloride ER (KLOR-CON M) 10 MEQ CR tablet     No current facility-administered medications for this visit.       No Known Allergies    Social History     Socioeconomic History     Marital status:      Spouse name: Dusty     Number of children: 4   Occupational History     Occupation: Unknown     Employer: UNKNOWN   Tobacco Use     Smoking status: Former Smoker     Packs/day: 1.00     Years: 30.00     Pack years: 30.00     Quit date: 1995     Years since quittin.4     Smokeless tobacco: Never Used   Substance and Sexual Activity     Alcohol use: Yes     Alcohol/week: 0.0 standard  drinks     Comment: 1/day   wine     Drug use: No     Sexual activity: Never     Partners: Male     Birth control/protection: Post-menopausal   Social History Narrative    No advanced care directives on file.     - Dusty       Family History   Problem Relation Age of Onset     Breast Cancer Other         aunt       ROS: 10 point ROS neg other than the symptoms noted above in the HPI.    Vital Signs: /65 (BP Location: Right arm, Patient Position: Sitting, Cuff Size: Adult Small)   Pulse 66   SpO2 98%     Neurological Physical Examination:  Awake  Alert  Oriented x 3  Speech clear  Cranial nerves II - XII grossly intact  PERRL  EOMI  Face symmetric  Tongue midline  Motor exam:  Iliopsoas  (hip flexion)               Right: 5/5  Left:  5/5  Quadriceps  (knee extension)       Right:  5/5  Left:  5/5  Hamstrings  (knee flexion)            Right:  5/5  Left:  5/5  Gastroc Soleus  (PF)                          Right:  5/5  Left:  5/5  Tibialis Ant  (DF)                          Right:  5/5  Left:  5/5  EHL                          Right:  5/5  Left:  5/5         Sensation intact     Reflexes are 2+ in the patellar and Achilles. There is no clonus.     Musculoskeletal:  Gait: Able to stand from a seated position. Normal non-antalgic, non-myelopathic gait.   No tenderness of the spine or paraspinous muscles.  Negative SI joint tenderness bilaterally.  Negative straight leg raise bilaterally.      Imaging:   XR THORACIC SPINE 2 VIEWS 8/1/2022 1:18 PM  IMPRESSION:  1.Redemonstration of severe pathologic compression fracture of T11  with greater than 80% vertebral body height loss. The associated  segmental kyphosis centered at T11 is not significantly changed. 2.  Small sclerotic lesion involving the posterior T12 vertebral body and  the larger sclerotic lesion at L2.   3. The small probable metastasis involving the T10 vertebral body seen  on the prior thoracic spine MRI is not well seen on this radiograph.       XR LUMBAR SPINE 2/3 VIEWS 8/1/2022 1:18 PM  IMPRESSION: Overall stable compared to lumbar spine radiographs  06/20/2022. Redemonstration of severe pathologic compression fracture  of T11. No significant change in the segmental kyphosis centered at  T11. The rest of the fetal body heights are maintained. Stable  sclerotic lesion involving L2.    Assessment/Plan:   88 year old female with history of lung cancer, thoracic and lumbar osseous metastases, and T11 pathologic compression deformity. Patient presents for follow-up of left sided low back pain. Patient declined TLSO brace in the past. We discussed options at this time. Patient is not interested in TLSO brace or surgical intervention. She was previously referred to [facility] but has not scheduled with them yet. She plans to schedule an appt with their clinic to discuss comprehensive services. Recommend to continue follow-up with oncologist as well. Advised patient to follow-up with our clinic as needed or with any new/worsening symptoms.     Advised patient to call our clinic with any questions or concerns. Discussed red flag symptoms and advised to seek medical attention if these develop. Patient voiced understanding and agreement.      Corina Roldan, CNP  [facility]  [address] [address]  Tel [phone]  Pager [phone]

## 2022-10-05 ENCOUNTER — OFFICE VISIT (OUTPATIENT)
Dept: CARDIOLOGY | Facility: CLINIC | Age: 87
End: 2022-10-05
Attending: PHYSICIAN ASSISTANT
Payer: MEDICARE

## 2022-10-05 VITALS
HEART RATE: 74 BPM | BODY MASS INDEX: 19.43 KG/M2 | DIASTOLIC BLOOD PRESSURE: 68 MMHG | WEIGHT: 116.6 LBS | HEIGHT: 65 IN | SYSTOLIC BLOOD PRESSURE: 110 MMHG | OXYGEN SATURATION: 96 %

## 2022-10-05 DIAGNOSIS — I48.91 ATRIAL FIBRILLATION WITH RVR (H): ICD-10-CM

## 2022-10-05 DIAGNOSIS — R60.0 LOCALIZED EDEMA: Primary | ICD-10-CM

## 2022-10-05 PROCEDURE — 99214 OFFICE O/P EST MOD 30 MIN: CPT | Performed by: INTERNAL MEDICINE

## 2022-10-05 RX ORDER — METOPROLOL SUCCINATE 25 MG/1
12.5 TABLET, EXTENDED RELEASE ORAL DAILY
Qty: 45 TABLET | Refills: 3 | Status: SHIPPED | OUTPATIENT
Start: 2022-10-05 | End: 2023-01-01

## 2022-10-05 RX ORDER — DIGOXIN 125 MCG
125 TABLET ORAL DAILY
Qty: 90 TABLET | Refills: 3 | Status: SHIPPED | OUTPATIENT
Start: 2022-10-05 | End: 2023-01-01

## 2022-10-05 NOTE — PATIENT INSTRUCTIONS
It was a pleasure seeing you today and thank you for allowing me to be a part of your health care team.  Should you have any questions regarding your visit or future needs please feel free to reach out to my care team for assistance.      Thank you, Dr. Octavio Guajardo        **Nursing: [phone]       **Scheduling: [phone]

## 2022-10-05 NOTE — LETTER
10/5/2022    Vel Calvert MD  [address] [address]    RE: Gely Tam       Dear Colleague,     I had the pleasure of seeing Gely TINO Tam in the [facility].  HPI and Plan:   See dictation    Today's clinic visit entailed:  Review of the result(s) of each unique test - echo  Ordering of each unique test  Prescription drug management  30 minutes spent on the date of the encounter doing chart review, review of test results, patient visit, documentation, discussion with family and daughter present for evaluation.   Provider  Link to MDM Help Grid     The level of medical decision making during this visit was of moderate complexity.      Orders Placed This Encounter   Procedures     Follow-Up with Cardiology     Echocardiogram Complete       Orders Placed This Encounter   Medications     trametinib (MEKINIST) 0.5 MG tablet     Sig: Take 0.5 mg by mouth 3 times daily     dabrafenib (TAFINLAR) 50 MG capsule     Sig: Take 50 mg by mouth 2 times daily     metoprolol succinate ER (TOPROL XL) 25 MG 24 hr tablet     Sig: Take 0.5 tablets (12.5 mg) by mouth daily     Dispense:  45 tablet     Refill:  3     digoxin (LANOXIN) 125 MCG tablet     Sig: Take 1 tablet (125 mcg) by mouth daily     Dispense:  90 tablet     Refill:  3     apixaban ANTICOAGULANT (ELIQUIS) 2.5 MG tablet     Sig: Take 1 tablet (2.5 mg) by mouth 2 times daily     Dispense:  180 tablet     Refill:  3       Medications Discontinued During This Encounter   Medication Reason     digoxin (LANOXIN) 125 MCG tablet Reorder     metoprolol succinate ER (TOPROL XL) 25 MG 24 hr tablet Reorder     apixaban ANTICOAGULANT (ELIQUIS) 2.5 MG tablet Reorder         Encounter Diagnoses   Name Primary?     Atrial fibrillation with RVR (H)      Localized edema Yes       CURRENT MEDICATIONS:  Current Outpatient Medications   Medication Sig Dispense Refill     acetaminophen (TYLENOL) 325 MG tablet Take  2 tablets (650 mg) by mouth every 4 hours as needed for other (surgical pain) 60 tablet 0     AMOXICILLIN PO Take 2,000 mg by mouth daily as needed (1 hour prior to dental procedures)       apixaban ANTICOAGULANT (ELIQUIS) 2.5 MG tablet Take 1 tablet (2.5 mg) by mouth 2 times daily 180 tablet 3     atorvastatin (LIPITOR) 10 MG tablet Take 10 mg by mouth daily.       calcium carbonate 600 mg-vitamin D 400 units (CALTRATE) 600-400 MG-UNIT per tablet Take 1 tablet by mouth 2 times daily       dabrafenib (TAFINLAR) 50 MG capsule Take 50 mg by mouth 2 times daily       digoxin (LANOXIN) 125 MCG tablet Take 1 tablet (125 mcg) by mouth daily 90 tablet 3     furosemide (LASIX) 20 MG tablet Take 20 mg by mouth daily       metoprolol succinate ER (TOPROL XL) 25 MG 24 hr tablet Take 0.5 tablets (12.5 mg) by mouth daily 45 tablet 3     potassium chloride ER (KLOR-CON M) 10 MEQ CR tablet Take 10 mEq by mouth daily       trametinib (MEKINIST) 0.5 MG tablet Take 0.5 mg by mouth 3 times daily         ALLERGIES   No Known Allergies    PAST MEDICAL HISTORY:  Past Medical History:   Diagnosis Date     Arthritis      HTN (hypertension)      Hyperlipemia        PAST SURGICAL HISTORY:  Past Surgical History:   Procedure Laterality Date     ARTHROPLASTY HIP  10/26/2012    Procedure: ARTHROPLASTY HIP;  RIGHT TOTAL HIP ARTHROPLASTY (J&J)^;  Surgeon: Diana Mcclellan MD;  Location: [facility] OR     ARTHROPLASTY KNEE Left 1/30/2018    Procedure: ARTHROPLASTY KNEE;  LEFT TOTAL KNEE ARTHROPLASTY ;  Surgeon: Diana Mcclellan MD;  Location:  OR     GYN SURGERY  1981     HYSTERECTOMY  1981    left ovary remains     LAPAROSCOPIC SALPINGOTOMY      bilat,. benign      ORTHOPEDIC SURGERY  2010    right tka     SOFT TISSUE SURGERY  1984 vein ligation       FAMILY HISTORY:  Family History   Problem Relation Age of Onset     Breast Cancer Other         aunt       SOCIAL HISTORY:  Social History     Socioeconomic History     Marital status:       "Spouse name: Dusty     Number of children: 4     Years of education: None     Highest education level: None   Occupational History     Occupation: Unknown     Employer: UNKNOWN   Tobacco Use     Smoking status: Former Smoker     Packs/day: 1.00     Years: 30.00     Pack years: 30.00     Quit date: 1995     Years since quittin.6     Smokeless tobacco: Never Used   Substance and Sexual Activity     Alcohol use: Yes     Alcohol/week: 0.0 standard drinks     Comment: 1/day   wine     Drug use: No     Sexual activity: Never     Partners: Male     Birth control/protection: Post-menopausal   Social History Narrative    No advanced care directives on file.     - Dusty       Review of Systems:  Skin:          Eyes:         ENT:         Respiratory:          Cardiovascular:         Gastroenterology:        Genitourinary:         Musculoskeletal:         Neurologic:         Psychiatric:         Heme/Lymph/Imm:         Endocrine:           Physical Exam:  Vitals: /68 (BP Location: Left arm, Patient Position: Sitting)   Pulse 74   Ht 1.651 m (5' 5\")   Wt 52.9 kg (116 lb 9.6 oz)   SpO2 96%   BMI 19.40 kg/m      Constitutional:           Skin:             Head:           Eyes:           Lymph:      ENT:           Neck:           Respiratory:            Cardiac:                                                           GI:           Extremities and Muscular Skeletal:                 Neurological:           Psych:           CC  Michelle Salas, HENRY  [address]  [place] [address]                Service Date: 10/05/2022    HISTORY OF PRESENT ILLNESS:  I had the opportunity to see Ms. Gely Tam in Cardiology Clinic today at [facility] in [place] for reevaluation of paroxysmal atrial fibrillation.  I met Ms. Tam when she was hospitalized at [facility] in early 2022.  She had been experiencing lower extremity edema and was referred for an echocardiogram on " an outpatient basis.  That study identified atrial fibrillation with rapid ventricular response.  She was referred to the emergency room and admitted for management of her atrial fibrillation.  Due to weight loss, she was evaluated with a chest x-ray and CT scan showing a 4.5 cm mass in the left upper lobe of the lung, which was subsequently identified as adenocarcinoma.  During her hospitalization, her blood pressure was running low and we had difficulty introducing rate-controlling medications.  Eventually, she was treated with metoprolol XL 12.5 mg daily and digoxin 0.125 mg daily, which seemed to control her rate adequately.  Eliquis 2.5 mg twice daily was added for stroke prevention.  Based on her weight and age, that dose was appropriate.    Her left ventricular function demonstrated normal left ventricular size and function, indicating that she probably did not have atrial fibrillation with rapid ventricular response for a prolonged period prior to her admission.  She had mild pulmonary hypertension.  She was doing well in followup several weeks later when she was seen here in Cardiology Clinic.  She then had a followup echocardiogram on 08/04/2022 ordered by her Oncology team to monitor for left ventricular dysfunction associated with her chemotherapy.  Fortunately, her echo looked stable again with normal left ventricular function.    She reports no symptoms associated with her atrial fibrillation.  She feels fatigued with her chemotherapy but is not experiencing any other concerning symptoms.    PHYSICAL EXAMINATION:  On examination today her blood pressure is 110/68, heart rate 74 and weight 116 pounds.  Her lungs are clear.  Heart rhythm is quite regular today.  I believe she is back in normal rhythm.  She has no cardiac murmurs.    IMPRESSIONS:  Ms. Gely Tam is an 88-year-old woman with paroxysmal atrial fibrillation, initially diagnosed with rapid ventricular response in early 05/2022 while  simultaneously diagnosed with metastatic adenocarcinoma of the lung.  Her atrial fibrillation has been asymptomatic, and she has remained on a low dose of metoprolol XL and digoxin for rate control.  She is also on low-dose Eliquis for stroke prevention.  I believe she is back in sinus rhythm today.  She is continuing with her chemotherapy for lung cancer treatment and is not experiencing any concerning cardiac issues at this time.  I will not make any medication changes.  I refilled her medications and will plan to have her follow up with me again in 1 year for reevaluation of these issues.    Mckenna King MD    cc:  MD Christiano Barclay[address] [address]    Mckenna King MD, [facility]        D: 10/05/2022   T: 10/05/2022   MT:     Name:     MARVA JONES  MRN:      [id number]        Account:      [id number]   :      1934           Service Date: 10/05/2022       Document: [id number]      Thank you for allowing me to participate in the care of your patient.      Sincerely,     MCKENNA KING MD     [facility]  cc:   Michelle Salas PA-C  [address] [place] [id number]

## 2022-10-05 NOTE — PROGRESS NOTES
Service Date: 10/05/2022    HISTORY OF PRESENT ILLNESS:  I had the opportunity to see Ms. Gely Tam in Cardiology Clinic today at [facility] in [place] for reevaluation of paroxysmal atrial fibrillation.  I met Ms. Tam when she was hospitalized at [facility] in early 05/2022.  She had been experiencing lower extremity edema and was referred for an echocardiogram on an outpatient basis.  That study identified atrial fibrillation with rapid ventricular response.  She was referred to the emergency room and admitted for management of her atrial fibrillation.  Due to weight loss, she was evaluated with a chest x-ray and CT scan showing a 4.5 cm mass in the left upper lobe of the lung, which was subsequently identified as adenocarcinoma.  During her hospitalization, her blood pressure was running low and we had difficulty introducing rate-controlling medications.  Eventually, she was treated with metoprolol XL 12.5 mg daily and digoxin 0.125 mg daily, which seemed to control her rate adequately.  Eliquis 2.5 mg twice daily was added for stroke prevention.  Based on her weight and age, that dose was appropriate.    Her left ventricular function demonstrated normal left ventricular size and function, indicating that she probably did not have atrial fibrillation with rapid ventricular response for a prolonged period prior to her admission.  She had mild pulmonary hypertension.  She was doing well in followup several weeks later when she was seen here in Cardiology Clinic.  She then had a followup echocardiogram on 08/04/2022 ordered by her Oncology team to monitor for left ventricular dysfunction associated with her chemotherapy.  Fortunately, her echo looked stable again with normal left ventricular function.    She reports no symptoms associated with her atrial fibrillation.  She feels fatigued with her chemotherapy but is not experiencing any other concerning symptoms.    PHYSICAL EXAMINATION:   On examination today her blood pressure is 110/68, heart rate 74 and weight 116 pounds.  Her lungs are clear.  Heart rhythm is quite regular today.  I believe she is back in normal rhythm.  She has no cardiac murmurs.    IMPRESSIONS:  Ms. Gely Tam is an 88-year-old woman with paroxysmal atrial fibrillation, initially diagnosed with rapid ventricular response in early 2022 while simultaneously diagnosed with metastatic adenocarcinoma of the lung.  Her atrial fibrillation has been asymptomatic, and she has remained on a low dose of metoprolol XL and digoxin for rate control.  She is also on low-dose Eliquis for stroke prevention.  I believe she is back in sinus rhythm today.  She is continuing with her chemotherapy for lung cancer treatment and is not experiencing any concerning cardiac issues at this time.  I will not make any medication changes.  I refilled her medications and will plan to have her follow up with me again in 1 year for reevaluation of these issues.    Octavio Guajardo MD    cc:  [place] [address]    Octavio Guajardo MD, [facility]        D: 10/05/2022   T: 10/05/2022   MT: [email or web address]    Name:     GELY TAM  MRN:      [id number]        Account:      [id number]   :      1934           Service Date: 10/05/2022       Document: [id number]

## 2022-10-05 NOTE — PROGRESS NOTES
HPI and Plan:   See dictation    Today's clinic visit entailed:  Review of the result(s) of each unique test - echo  Ordering of each unique test  Prescription drug management  30 minutes spent on the date of the encounter doing chart review, review of test results, patient visit, documentation, discussion with family and daughter present for evaluation.   Provider  Link to MDM Help Grid     The level of medical decision making during this visit was of moderate complexity.      Orders Placed This Encounter   Procedures     Follow-Up with Cardiology     Echocardiogram Complete       Orders Placed This Encounter   Medications     trametinib (MEKINIST) 0.5 MG tablet     Sig: Take 0.5 mg by mouth 3 times daily     dabrafenib (TAFINLAR) 50 MG capsule     Sig: Take 50 mg by mouth 2 times daily     metoprolol succinate ER (TOPROL XL) 25 MG 24 hr tablet     Sig: Take 0.5 tablets (12.5 mg) by mouth daily     Dispense:  45 tablet     Refill:  3     digoxin (LANOXIN) 125 MCG tablet     Sig: Take 1 tablet (125 mcg) by mouth daily     Dispense:  90 tablet     Refill:  3     apixaban ANTICOAGULANT (ELIQUIS) 2.5 MG tablet     Sig: Take 1 tablet (2.5 mg) by mouth 2 times daily     Dispense:  180 tablet     Refill:  3       Medications Discontinued During This Encounter   Medication Reason     digoxin (LANOXIN) 125 MCG tablet Reorder     metoprolol succinate ER (TOPROL XL) 25 MG 24 hr tablet Reorder     apixaban ANTICOAGULANT (ELIQUIS) 2.5 MG tablet Reorder         Encounter Diagnoses   Name Primary?     Atrial fibrillation with RVR (H)      Localized edema Yes       CURRENT MEDICATIONS:  Current Outpatient Medications   Medication Sig Dispense Refill     acetaminophen (TYLENOL) 325 MG tablet Take 2 tablets (650 mg) by mouth every 4 hours as needed for other (surgical pain) 60 tablet 0     AMOXICILLIN PO Take 2,000 mg by mouth daily as needed (1 hour prior to dental procedures)       apixaban ANTICOAGULANT (ELIQUIS) 2.5 MG tablet  Take 1 tablet (2.5 mg) by mouth 2 times daily 180 tablet 3     atorvastatin (LIPITOR) 10 MG tablet Take 10 mg by mouth daily.       calcium carbonate 600 mg-vitamin D 400 units (CALTRATE) 600-400 MG-UNIT per tablet Take 1 tablet by mouth 2 times daily       dabrafenib (TAFINLAR) 50 MG capsule Take 50 mg by mouth 2 times daily       digoxin (LANOXIN) 125 MCG tablet Take 1 tablet (125 mcg) by mouth daily 90 tablet 3     furosemide (LASIX) 20 MG tablet Take 20 mg by mouth daily       metoprolol succinate ER (TOPROL XL) 25 MG 24 hr tablet Take 0.5 tablets (12.5 mg) by mouth daily 45 tablet 3     potassium chloride ER (KLOR-CON M) 10 MEQ CR tablet Take 10 mEq by mouth daily       trametinib (MEKINIST) 0.5 MG tablet Take 0.5 mg by mouth 3 times daily         ALLERGIES   No Known Allergies    PAST MEDICAL HISTORY:  Past Medical History:   Diagnosis Date     Arthritis      HTN (hypertension)      Hyperlipemia        PAST SURGICAL HISTORY:  Past Surgical History:   Procedure Laterality Date     ARTHROPLASTY HIP  10/26/2012    Procedure: ARTHROPLASTY HIP;  RIGHT TOTAL HIP ARTHROPLASTY (J&J)^;  Surgeon: Diana Mcclellan MD;  Location: [facility] OR     ARTHROPLASTY KNEE Left 1/30/2018    Procedure: ARTHROPLASTY KNEE;  LEFT TOTAL KNEE ARTHROPLASTY ;  Surgeon: Diana Mcclellan MD;  Location: [facility] OR     GYN SURGERY  1981     HYSTERECTOMY  1981    left ovary remains     LAPAROSCOPIC SALPINGOTOMY      bilat,. benign      ORTHOPEDIC SURGERY  2010    right tka     SOFT TISSUE SURGERY  1984 vein ligation       FAMILY HISTORY:  Family History   Problem Relation Age of Onset     Breast Cancer Other         aunt       SOCIAL HISTORY:  Social History     Socioeconomic History     Marital status:      Spouse name: Dusty     Number of children: 4     Years of education: None     Highest education level: None   Occupational History     Occupation: Unknown     Employer: UNKNOWN   Tobacco Use     Smoking status: Former Smoker     Packs/day:  "1.00     Years: 30.00     Pack years: 30.00     Quit date: 1995     Years since quittin.6     Smokeless tobacco: Never Used   Substance and Sexual Activity     Alcohol use: Yes     Alcohol/week: 0.0 standard drinks     Comment: 1/day   wine     Drug use: No     Sexual activity: Never     Partners: Male     Birth control/protection: Post-menopausal   Social History Narrative    No advanced care directives on file.     - Dusty       Review of Systems:  Skin:          Eyes:         ENT:         Respiratory:          Cardiovascular:         Gastroenterology:        Genitourinary:         Musculoskeletal:         Neurologic:         Psychiatric:         Heme/Lymph/Imm:         Endocrine:           Physical Exam:  Vitals: /68 (BP Location: Left arm, Patient Position: Sitting)   Pulse 74   Ht 1.651 m (5' 5\")   Wt 52.9 kg (116 lb 9.6 oz)   SpO2 96%   BMI 19.40 kg/m      Constitutional:           Skin:             Head:           Eyes:           Lymph:      ENT:           Neck:           Respiratory:            Cardiac:                                                           GI:           Extremities and Muscular Skeletal:                 Neurological:           Psych:           CC  Michelle Salas PA-C  [address]  [place] [id number]              "

## 2022-11-10 ENCOUNTER — TRANSFERRED RECORDS (OUTPATIENT)
Dept: HEALTH INFORMATION MANAGEMENT | Facility: CLINIC | Age: 87
End: 2022-11-10

## 2022-12-01 ENCOUNTER — ANCILLARY PROCEDURE (OUTPATIENT)
Dept: CT IMAGING | Facility: CLINIC | Age: 87
End: 2022-12-01
Attending: INTERNAL MEDICINE
Payer: MEDICARE

## 2022-12-01 DIAGNOSIS — C34.12 MALIGNANT NEOPLASM OF BRONCHUS OF LEFT UPPER LOBE (H): ICD-10-CM

## 2022-12-01 LAB
CREAT BLD-MCNC: 0.6 MG/DL (ref 0.5–1)
GFR SERPL CREATININE-BSD FRML MDRD: >60 ML/MIN/1.73M2

## 2022-12-01 PROCEDURE — 255N000002 HC RX 255 OP 636: Performed by: INTERNAL MEDICINE

## 2022-12-01 PROCEDURE — 82565 ASSAY OF CREATININE: CPT

## 2022-12-01 PROCEDURE — 74177 CT ABD & PELVIS W/CONTRAST: CPT

## 2022-12-01 RX ADMIN — IOHEXOL 75 ML: 350 INJECTION, SOLUTION INTRAVENOUS at 11:40

## 2022-12-08 ENCOUNTER — TRANSFERRED RECORDS (OUTPATIENT)
Dept: HEALTH INFORMATION MANAGEMENT | Facility: CLINIC | Age: 87
End: 2022-12-08

## 2023-01-01 ENCOUNTER — ANCILLARY PROCEDURE (OUTPATIENT)
Dept: CT IMAGING | Facility: CLINIC | Age: 88
End: 2023-01-01
Attending: INTERNAL MEDICINE
Payer: MEDICARE

## 2023-01-01 ENCOUNTER — APPOINTMENT (OUTPATIENT)
Dept: GENERAL RADIOLOGY | Facility: CLINIC | Age: 88
End: 2023-01-01
Attending: SOCIAL WORKER
Payer: MEDICARE

## 2023-01-01 ENCOUNTER — TRANSFERRED RECORDS (OUTPATIENT)
Dept: HEALTH INFORMATION MANAGEMENT | Facility: CLINIC | Age: 88
End: 2023-01-01
Payer: MEDICARE

## 2023-01-01 ENCOUNTER — ANCILLARY PROCEDURE (OUTPATIENT)
Dept: MRI IMAGING | Facility: CLINIC | Age: 88
End: 2023-01-01
Attending: INTERNAL MEDICINE
Payer: MEDICARE

## 2023-01-01 ENCOUNTER — HOSPITAL ENCOUNTER (EMERGENCY)
Facility: CLINIC | Age: 88
Discharge: HOME | End: 2023-09-15
Attending: SOCIAL WORKER | Admitting: SOCIAL WORKER
Payer: MEDICARE

## 2023-01-01 VITALS
TEMPERATURE: 97.6 F | HEART RATE: 77 BPM | SYSTOLIC BLOOD PRESSURE: 101 MMHG | DIASTOLIC BLOOD PRESSURE: 52 MMHG | BODY MASS INDEX: 17.83 KG/M2 | OXYGEN SATURATION: 99 % | WEIGHT: 107 LBS | RESPIRATION RATE: 19 BRPM | HEIGHT: 65 IN

## 2023-01-01 DIAGNOSIS — R42 DIZZINESS: ICD-10-CM

## 2023-01-01 DIAGNOSIS — C34.90 NON-SMALL CELL LUNG CANCER, UNSPECIFIED LATERALITY (H): ICD-10-CM
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
## 2023-01-01 DIAGNOSIS — I48.91 ATRIAL FIBRILLATION WITH RVR (H): ICD-10-CM

## 2023-01-01 DIAGNOSIS — C34.12 MALIGNANT NEOPLASM OF UPPER LOBE BRONCHUS, LEFT (H): ICD-10-CM

## 2023-01-01 DIAGNOSIS — I48.91 ATRIAL FIBRILLATION WITH RVR (H): Primary | ICD-10-CM

## 2023-01-01 LAB
ALBUMIN SERPL BCG-MCNC: 2.9 G/DL (ref 3.5–5.2)
ALP SERPL-CCNC: 101 U/L (ref 35–104)
ALT SERPL W P-5'-P-CCNC: 16 U/L (ref 0–50)
ANION GAP SERPL CALCULATED.3IONS-SCNC: 8 MMOL/L (ref 7–15)
AST SERPL W P-5'-P-CCNC: 41 U/L (ref 0–45)
ATRIAL RATE - MUSE: 64 BPM
BASOPHILS # BLD AUTO: 0 10E3/UL (ref 0–0.2)
BASOPHILS NFR BLD AUTO: 0 %
BILIRUB SERPL-MCNC: 0.4 MG/DL
BUN SERPL-MCNC: 10.9 MG/DL (ref 8–23)
CALCIUM SERPL-MCNC: 8 MG/DL (ref 8.8–10.2)
CHLORIDE SERPL-SCNC: 98 MMOL/L (ref 98–107)
CK SERPL-CCNC: 664 U/L (ref 26–192)
CREAT BLD-MCNC: 0.6 MG/DL (ref 0.5–1)
CREAT BLD-MCNC: 0.7 MG/DL (ref 0.5–1)
CREAT SERPL-MCNC: 0.59 MG/DL (ref 0.51–0.95)
DEPRECATED HCO3 PLAS-SCNC: 27 MMOL/L (ref 22–29)
DIASTOLIC BLOOD PRESSURE - MUSE: NORMAL MMHG
EGFRCR SERPLBLD CKD-EPI 2021: 86 ML/MIN/1.73M2
EGFRCR SERPLBLD CKD-EPI 2021: >60 ML/MIN/1.73M2
EOSINOPHIL # BLD AUTO: 0 10E3/UL (ref 0–0.7)
EOSINOPHIL NFR BLD AUTO: 0 %
ERYTHROCYTE [DISTWIDTH] IN BLOOD BY AUTOMATED COUNT: 13.7 % (ref 10–15)
GFR SERPL CREATININE-BSD FRML MDRD: >60 ML/MIN/1.73M2
GLUCOSE SERPL-MCNC: 120 MG/DL (ref 70–99)
HCT VFR BLD AUTO: 37.2 % (ref 35–47)
HGB BLD-MCNC: 12.9 G/DL (ref 11.7–15.7)
HOLD SPECIMEN: NORMAL
IMM GRANULOCYTES # BLD: 0 10E3/UL
IMM GRANULOCYTES NFR BLD: 0 %
INTERPRETATION ECG - MUSE: NORMAL
LYMPHOCYTES # BLD AUTO: 0.8 10E3/UL (ref 0.8–5.3)
LYMPHOCYTES NFR BLD AUTO: 22 %
MCH RBC QN AUTO: 31.6 PG (ref 26.5–33)
MCHC RBC AUTO-ENTMCNC: 34.7 G/DL (ref 31.5–36.5)
MCV RBC AUTO: 91 FL (ref 78–100)
MONOCYTES # BLD AUTO: 0.4 10E3/UL (ref 0–1.3)
MONOCYTES NFR BLD AUTO: 12 %
NEUTROPHILS # BLD AUTO: 2.5 10E3/UL (ref 1.6–8.3)
NEUTROPHILS NFR BLD AUTO: 66 %
NRBC # BLD AUTO: 0 10E3/UL
NRBC BLD AUTO-RTO: 0 /100
P AXIS - MUSE: 89 DEGREES
PLATELET # BLD AUTO: 154 10E3/UL (ref 150–450)
POTASSIUM SERPL-SCNC: 3.4 MMOL/L (ref 3.4–5.3)
PR INTERVAL - MUSE: 150 MS
PROT SERPL-MCNC: 6 G/DL (ref 6.4–8.3)
QRS DURATION - MUSE: 124 MS
QT - MUSE: 460 MS
QTC - MUSE: 474 MS
R AXIS - MUSE: 5 DEGREES
RBC # BLD AUTO: 4.08 10E6/UL (ref 3.8–5.2)
SODIUM SERPL-SCNC: 133 MMOL/L (ref 136–145)
SYSTOLIC BLOOD PRESSURE - MUSE: NORMAL MMHG
T AXIS - MUSE: 70 DEGREES
TROPONIN T SERPL HS-MCNC: 23 NG/L
TROPONIN T SERPL HS-MCNC: 27 NG/L
VENTRICULAR RATE- MUSE: 64 BPM
WBC # BLD AUTO: 3.7 10E3/UL (ref 4–11)

## 2023-01-01 PROCEDURE — 80053 COMPREHEN METABOLIC PANEL: CPT | Performed by: SOCIAL WORKER

## 2023-01-01 PROCEDURE — 73030 X-RAY EXAM OF SHOULDER: CPT | Mod: RT

## 2023-01-01 PROCEDURE — 99285 EMERGENCY DEPT VISIT HI MDM: CPT | Mod: 25

## 2023-01-01 PROCEDURE — 74177 CT ABD & PELVIS W/CONTRAST: CPT

## 2023-01-01 PROCEDURE — 255N000002 HC RX 255 OP 636: Performed by: INTERNAL MEDICINE

## 2023-01-01 PROCEDURE — 36415 COLL VENOUS BLD VENIPUNCTURE: CPT | Performed by: SOCIAL WORKER

## 2023-01-01 PROCEDURE — 82565 ASSAY OF CREATININE: CPT

## 2023-01-01 PROCEDURE — 258N000003 HC RX IP 258 OP 636: Performed by: SOCIAL WORKER

## 2023-01-01 PROCEDURE — 82550 ASSAY OF CK (CPK): CPT | Performed by: SOCIAL WORKER

## 2023-01-01 PROCEDURE — 250N000011 HC RX IP 250 OP 636: Performed by: INTERNAL MEDICINE

## 2023-01-01 PROCEDURE — A9585 GADOBUTROL INJECTION: HCPCS | Performed by: INTERNAL MEDICINE

## 2023-01-01 PROCEDURE — 250N000009 HC RX 250: Performed by: INTERNAL MEDICINE

## 2023-01-01 PROCEDURE — 93005 ELECTROCARDIOGRAM TRACING: CPT

## 2023-01-01 PROCEDURE — 85025 COMPLETE CBC W/AUTO DIFF WBC: CPT | Performed by: SOCIAL WORKER

## 2023-01-01 PROCEDURE — 84484 ASSAY OF TROPONIN QUANT: CPT | Performed by: SOCIAL WORKER

## 2023-01-01 PROCEDURE — 70553 MRI BRAIN STEM W/O & W/DYE: CPT

## 2023-01-01 PROCEDURE — 85014 HEMATOCRIT: CPT | Performed by: EMERGENCY MEDICINE

## 2023-01-01 PROCEDURE — 36415 COLL VENOUS BLD VENIPUNCTURE: CPT | Performed by: EMERGENCY MEDICINE

## 2023-01-01 PROCEDURE — 96360 HYDRATION IV INFUSION INIT: CPT

## 2023-01-01 RX ORDER — METOPROLOL SUCCINATE 25 MG/1
12.5 TABLET, EXTENDED RELEASE ORAL DAILY
Qty: 45 TABLET | Refills: 0 | Status: SHIPPED | OUTPATIENT
Start: 2023-01-01 | End: 2024-01-01

## 2023-01-01 RX ORDER — DIGOXIN 125 MCG
125 TABLET ORAL DAILY
Qty: 90 TABLET | Refills: 2 | Status: SHIPPED | OUTPATIENT
Start: 2023-01-01

## 2023-01-01 RX ORDER — IOPAMIDOL 755 MG/ML
75 INJECTION, SOLUTION INTRAVASCULAR ONCE
Status: COMPLETED | OUTPATIENT
Start: 2023-01-01 | End: 2023-01-01

## 2023-01-01 RX ORDER — GADOBUTROL 604.72 MG/ML
5.5 INJECTION INTRAVENOUS ONCE
Status: COMPLETED | OUTPATIENT
Start: 2023-01-01 | End: 2023-01-01

## 2023-01-01 RX ADMIN — SODIUM CHLORIDE 40 ML: 9 INJECTION, SOLUTION INTRAVENOUS at 11:18

## 2023-01-01 RX ADMIN — IOHEXOL 75 ML: 350 INJECTION, SOLUTION INTRAVENOUS at 10:55

## 2023-01-01 RX ADMIN — SODIUM CHLORIDE 500 ML: 9 INJECTION, SOLUTION INTRAVENOUS at 11:57

## 2023-01-01 RX ADMIN — GADOBUTROL 5.5 ML: 604.72 INJECTION INTRAVENOUS at 10:57

## 2023-01-01 RX ADMIN — IOPAMIDOL 75 ML: 755 INJECTION, SOLUTION INTRAVENOUS at 11:17

## 2023-01-01 ASSESSMENT — ACTIVITIES OF DAILY LIVING (ADL)
ADLS_ACUITY_SCORE: 43

## 2023-03-01 ENCOUNTER — ANCILLARY PROCEDURE (OUTPATIENT)
Dept: MRI IMAGING | Facility: CLINIC | Age: 88
End: 2023-03-01
Attending: INTERNAL MEDICINE
Payer: MEDICARE

## 2023-03-01 ENCOUNTER — ANCILLARY PROCEDURE (OUTPATIENT)
Dept: CT IMAGING | Facility: CLINIC | Age: 88
End: 2023-03-01
Attending: INTERNAL MEDICINE
Payer: MEDICARE

## 2023-03-01 DIAGNOSIS — C34.90 NON-SMALL CELL LUNG CANCER (H): ICD-10-CM

## 2023-03-01 LAB
CREAT BLD-MCNC: 0.7 MG/DL (ref 0.5–1)
GFR SERPL CREATININE-BSD FRML MDRD: >60 ML/MIN/1.73M2

## 2023-03-01 PROCEDURE — A9585 GADOBUTROL INJECTION: HCPCS | Performed by: INTERNAL MEDICINE

## 2023-03-01 PROCEDURE — 72157 MRI CHEST SPINE W/O & W/DYE: CPT

## 2023-03-01 PROCEDURE — 82565 ASSAY OF CREATININE: CPT

## 2023-03-01 PROCEDURE — 74177 CT ABD & PELVIS W/CONTRAST: CPT

## 2023-03-01 PROCEDURE — 72158 MRI LUMBAR SPINE W/O & W/DYE: CPT

## 2023-03-01 PROCEDURE — 255N000002 HC RX 255 OP 636: Performed by: INTERNAL MEDICINE

## 2023-03-01 PROCEDURE — 250N000011 HC RX IP 250 OP 636: Performed by: INTERNAL MEDICINE

## 2023-03-01 RX ORDER — GADOBUTROL 604.72 MG/ML
6 INJECTION INTRAVENOUS ONCE
Status: COMPLETED | OUTPATIENT
Start: 2023-03-01 | End: 2023-03-01

## 2023-03-01 RX ORDER — IOPAMIDOL 755 MG/ML
100 INJECTION, SOLUTION INTRAVASCULAR ONCE
Status: COMPLETED | OUTPATIENT
Start: 2023-03-01 | End: 2023-03-01

## 2023-03-01 RX ADMIN — GADOBUTROL 6 ML: 604.72 INJECTION INTRAVENOUS at 15:45

## 2023-03-01 RX ADMIN — IOPAMIDOL 100 ML: 755 INJECTION, SOLUTION INTRAVENOUS at 15:28

## 2023-03-08 ENCOUNTER — TRANSFERRED RECORDS (OUTPATIENT)
Dept: HEALTH INFORMATION MANAGEMENT | Facility: CLINIC | Age: 88
End: 2023-03-08

## 2023-09-15 NOTE — ED TRIAGE NOTES
Patient comes from home. EMS report includes patient woke up at 0300 to go to the bathroom, felt dizzy, fell on the way to bathroom no LOC on Eliquis, had new onset diarrhea. EMS blood sugar 130. Taking oral chemo     Triage Assessment       Row Name 09/15/23 1011       Triage Assessment (Adult)    Airway WDL WDL       Respiratory WDL    Respiratory WDL WDL       Skin Circulation/Temperature WDL    Skin Circulation/Temperature WDL WDL       Cardiac WDL    Cardiac WDL WDL       Peripheral/Neurovascular WDL    Peripheral Neurovascular WDL WDL       Cognitive/Neuro/Behavioral WDL    Cognitive/Neuro/Behavioral WDL WDL

## 2023-09-15 NOTE — ED PROVIDER NOTES
History     Chief Complaint:  Dizziness and Fall       HPI   Gely Tam is a 89 year old female with a history of metastatic lung cancer who presented to the emergency department after a fall.  Patient reports that when she was stood up to go to the bathroom at 0 300, she stood up had felt ill little lightheaded and dizzy and fell to the floor.  She did not strike her head and she did not lose consciousness however she was too weak to get up off of the floor.  Lay on the floor for 5 hours until she was able to call her family around 845, they are the ones who called the paramedics.  In the emergency department, patient states that she feels like she has some stiff knees and lower extremity stiffness however no pain anywhere.  Did have an episode of diarrhea while she was on the floor, no vomiting, no abdominal pain, no chest pain, no shortness of breath, no headache. Lives alone.      Independent Historian:   Son - They report they called the ambulance this morning    Review of External Notes:   Office visit 9/30/2022: Lung cancer with thoracic lumbar mets  Office visit 10/5/2022: A-fib on Eliquis, Bp 110      Medications:    acetaminophen (TYLENOL) 325 MG tablet  AMOXICILLIN PO  apixaban ANTICOAGULANT (ELIQUIS) 2.5 MG tablet  atorvastatin (LIPITOR) 10 MG tablet  calcium carbonate 600 mg-vitamin D 400 units (CALTRATE) 600-400 MG-UNIT per tablet  dabrafenib (TAFINLAR) 50 MG capsule  digoxin (LANOXIN) 125 MCG tablet  furosemide (LASIX) 20 MG tablet  metoprolol succinate ER (TOPROL XL) 25 MG 24 hr tablet  potassium chloride ER (KLOR-CON M) 10 MEQ CR tablet  trametinib (MEKINIST) 0.5 MG tablet        Past Medical History:    Past Medical History:   Diagnosis Date    Arthritis     HTN (hypertension)     Hyperlipemia        Past Surgical History:    Past Surgical History:   Procedure Laterality Date    ARTHROPLASTY HIP  10/26/2012    Procedure: ARTHROPLASTY HIP;  RIGHT TOTAL HIP ARTHROPLASTY (J&J)^;  Surgeon:  "Diana Mcclellan MD;  Location: [facility]    ARTHROPLASTY KNEE Left 1/30/2018    Procedure: ARTHROPLASTY KNEE;  LEFT TOTAL KNEE ARTHROPLASTY ;  Surgeon: Diana Mcclellan MD;  Location: [facility]    GYN SURGERY  1981    HYSTERECTOMY  1981    left ovary remains    LAPAROSCOPIC SALPINGOTOMY      bilat,. benign     ORTHOPEDIC SURGERY  2010    right tka    SOFT TISSUE SURGERY  1984 vein ligation        Physical Exam   Patient Vitals for the past 24 hrs:   BP Temp Temp src Pulse Resp SpO2 Height Weight   09/15/23 1604 -- -- -- 77 -- 99 % -- --   09/15/23 1245 101/52 -- -- 59 -- -- -- --   09/15/23 1211 -- -- -- 62 19 99 % -- --   09/15/23 1009 93/62 97.6  F (36.4  C) Oral 77 16 98 % 1.651 m (5' 5\") 48.5 kg (107 lb)        Physical Exam  General: Alert, interactive appropriately  HEENT: Conjunctivae clear, no scleral icterus  Neuro: Alert and oriented to person, place, and time  Speech fluent, no confusion   PERRL, extraocular movements intact, no facial droop, smile symmetric, strength equal bilaterally over the face, sensation equal bilaterally of the face, tongue midline   strength equal and full bilaterally, flexion extension upper extremity equal bilaterally, normal sensation and equal bilaterally  Dorsiflexion plantarflexion full and equal, flexion extension lower extremity equal bilaterally, normal sensation and equal bilaterally  No pronator drift  Generalized tremor throughout both hands, no truncal ataxia   CV: Regular rate and rhythm, no murmurs, pulses equal   Respiratory: No signs of respiratory distress, lungs clear to auscultation bilaterally   Abdomen: Soft, nontender nondistended   MSK: No rashes lesions skin intact, swelling over the left foot       Emergency Department Course   ECG  ECG taken at 1023, ECG read at 142  Sinus rhythm with occasional PAC and RBB present   Sinus rhythm has replaced atrial flutter as compared to prior, dated 05/06/2022  Rate 64 bpm. CT interval 150 ms. QRS duration 124 ms. " QT/QTc 460/474 ms. P-R-T axes 89 5 70.     Imaging:  XR Shoulder Right G/E 3 Views   Final Result   IMPRESSION: Normal right glenohumeral and acromioclavicular alignment.   No displaced fracture. There is mild-to-moderate acromioclavicular and   glenohumeral degenerative arthrosis. Several right upper lobe   pulmonary nodules are better evaluated on the recent chest CT dated   9/7/2023. A sclerotic metastatic lesion in the right humeral head is   also better seen on the prior CT.      JULIA FORDE MD            SYSTEM ID:  [id number]         Report per radiology    Laboratory:  Labs Ordered and Resulted from Time of ED Arrival to Time of ED Departure   COMPREHENSIVE METABOLIC PANEL - Abnormal       Result Value    Sodium 133 (*)     Potassium 3.4      Chloride 98      Carbon Dioxide (CO2) 27      Anion Gap 8      Urea Nitrogen 10.9      Creatinine 0.59      Calcium 8.0 (*)     Glucose 120 (*)     Alkaline Phosphatase 101      AST 41      ALT 16      Protein Total 6.0 (*)     Albumin 2.9 (*)     Bilirubin Total 0.4      GFR Estimate 86     CBC WITH PLATELETS AND DIFFERENTIAL - Abnormal    WBC Count 3.7 (*)     RBC Count 4.08      Hemoglobin 12.9      Hematocrit 37.2      MCV 91      MCH 31.6      MCHC 34.7      RDW 13.7      Platelet Count 154      % Neutrophils 66      % Lymphocytes 22      % Monocytes 12      % Eosinophils 0      % Basophils 0      % Immature Granulocytes 0      NRBCs per 100 WBC 0      Absolute Neutrophils 2.5      Absolute Lymphocytes 0.8      Absolute Monocytes 0.4      Absolute Eosinophils 0.0      Absolute Basophils 0.0      Absolute Immature Granulocytes 0.0      Absolute NRBCs 0.0     CK TOTAL - Abnormal     (*)    TROPONIN T, HIGH SENSITIVITY - Abnormal    Troponin T, High Sensitivity 27 (*)    TROPONIN T, HIGH SENSITIVITY - Abnormal    Troponin T, High Sensitivity 23 (*)       Procedures     Emergency Department Course & Assessments:             Interventions:  Medications    sodium chloride 0.9% BOLUS 500 mL (0 mLs Intravenous Stopped 9/15/23 1311)        Assessments:  1055: I assessed and examined patient as above   ED Course as of 09/15/23 1723   Fri Sep 15, 2023   1206 Troponin T, High Sensitivity(!): 27   1347 Trop is downtrending    1348 Troponin is a negative delta       Independent Interpretation (X-rays, CTs, rhythm strip):  On my independent interpretation, no obvious signs of shoulder dislocation or fracture     Consultations/Discussion of Management or Tests:  None     ED Course as of 09/15/23 1723   Fri Sep 15, 2023   1206 Troponin T, High Sensitivity(!): 27   1347 Trop is downtrending    1348 Troponin is a negative delta       Social Determinants of Health affecting care:   None    Disposition:  The patient was discharged to home.     Impression & Plan    CMS Diagnoses: None    Medical Decision Makin-year-old female with a history of metastatic lung cancer who presented to the emergency department after a fall at home.  On my exam, patient overall stable and nontoxic-appearing.  Her neurologic exam was without any focal deficit patient per chart review has had blood pressures normally in the 90s to 100s.  On neurologic exam, she did have a tremor which she says has been present for at least a month has not been worsened recently, otherwise her neurologic exam is without any focal deficit.   She did not have any diarrhea here in the emergency department.   She denied any chest pain or shortness of breath prior to her fall nor did she have any in the emergency department.  Her EKG did not show any signs of ischemia and she had negative delta troponin.  She reported no fevers and was afebrile here. She passed an ambulation trial with with a walker in the emergency department and she does have a walker at home as well.  Her labs are all within normal limits.  No signs of rhabdo on her CK, creatinine is good.  Patient felt comfortable going home.  She did not have any  dizziness in the emergency department.  I did not think her symptoms were secondary to stroke, did not think her symptoms are secondary to ischemia.  Family is comfortable keeping an eye on her as well.  They will call her oncologist to schedule a follow-up appointment.  Patient was discharged in stable condition.    Diagnosis:    ICD-10-CM    1. Dizziness  R42       2. Fall, initial encounter  W19.XXXA            Discharge Medications:  Discharge Medication List as of 9/15/2023  3:50 PM             Cici Marin MD  9/15/2023   Cici Booth MD Wu Klasek, Connie, MD  09/15/23 1726

## 2023-09-15 NOTE — DISCHARGE INSTRUCTIONS
Ms. Tam, you were seen in the emergency department after a fall.  Your exam was overall reassuring, we do not see signs of an acute emergency at this time.  You did not feel dizzy while you are here in the emergency department.  You were able to walk with a walker, which you report that you do have at home.    Your labs here were also reassuring overall.  We did give you some fluids.  You did not have any diarrhea while you are here.    Please call your primary care doctor and Dr. Velasquez to schedule a follow-up appointment next week to get a checkup.    Please keep your cell phone near you at all times in case you do have another fall.  If you have chest pain, if you have shortness of breath, if you have vomiting cannot keep anything down, if you have lots of diarrhea again, if you have bleeding from anywhere, or other concerning symptoms please come back to the emergency department.

## 2024-01-01 DIAGNOSIS — I10 PRIMARY HYPERTENSION: ICD-10-CM

## 2024-01-01 DIAGNOSIS — I48.91 ATRIAL FIBRILLATION WITH RVR (H): ICD-10-CM

## 2024-01-01 DIAGNOSIS — I48.91 ATRIAL FIBRILLATION WITH RVR (H): Primary | ICD-10-CM

## 2024-01-01 DIAGNOSIS — E78.9 LIPID DISORDER: ICD-10-CM

## 2024-01-01 RX ORDER — METOPROLOL SUCCINATE 25 MG/1
12.5 TABLET, EXTENDED RELEASE ORAL DAILY
Qty: 45 TABLET | Refills: 0 | Status: SHIPPED | OUTPATIENT
Start: 2024-01-01

## 2024-04-11 NOTE — PROGRESS NOTES
[facility] Cardiology Refill Guideline reviewed.  Medication does not meet criteria for refill due to patient is overdue for annual OV.  Messaged to providers team for further review.     Called pt to review she needs an appt for long term cardiac med refills. Pt had an appt w/Dr. Guajardo on 4/1/24 but that was marked as no-show. Patient states she was unaware, her daughter in law schedules her appts. She requested I call her DIL, Lucretia, at [phone].    Number not working. I then called pt's emergency contact listed her son Neal. Spoke w/him and he reports the pt is now on hospice and that is why they didn't show for her appt recently. Neal states his Mom is not taking her usual medications any longer d/t being on hospice so she doesn't need a refill. He states we can close out follow up orders. I wished them our sympathy as they navigate through this difficult time. Catalina Alvarado RN on 4/11/2024 at 10:05 AM

## (undated) DEVICE — BLADE SAW RECIP STRK 70X12.5X1.2MM 0277-096-281

## (undated) DEVICE — SOL NACL 0.9% IRRIG 1000ML BOTTLE 07138-09

## (undated) DEVICE — IMM KNEE 20" 0814-2660

## (undated) DEVICE — DRAIN ROUND W/RESERV KIT JACKSON PRATT 10FR 400ML SU130-402D

## (undated) DEVICE — CATH TRAY FOLEY SURESTEP 16FR WDRAIN BAG STLK LATEX A300316A

## (undated) DEVICE — BONE CEMENT MIXEVAC III HI VAC KIT  0206-015-000

## (undated) DEVICE — LINEN TOWEL PACK X5 5464

## (undated) DEVICE — DRAPE STERI TOWEL LG 1010

## (undated) DEVICE — BLADE SAW SAGITTAL STRK 19.5X90X1.20MM 2108-109-000S17

## (undated) DEVICE — WRAP EZY KNEE

## (undated) DEVICE — SU MONOCRYL 3-0 PS-2 27" Y427H

## (undated) DEVICE — GLOVE PROTEXIS BLUE W/NEU-THERA 8.0  2D73EB80

## (undated) DEVICE — SYR 50ML LL W/O NDL 309653

## (undated) DEVICE — SU VICRYL 0 CP-1 27" J467H

## (undated) DEVICE — GLOVE PROTEXIS W/NEU-THERA 8.0  2D73TE80

## (undated) DEVICE — NDL SPINAL 18GA 3.5" 405184

## (undated) DEVICE — SU VICRYL 2-0 CP-1 27" J466H

## (undated) DEVICE — SU ETHIBOND 0 CTX CR  8X18" CX31D

## (undated) DEVICE — SUCTION IRR SYSTEM W/O TIP INTERPULSE HANDPIECE 0210-100-000

## (undated) DEVICE — SOLUTION WOUND CLEANSING 3/4OZ 10% PVP EA-L3011FB-50

## (undated) DEVICE — DRSG KERLIX FLUFFS X5

## (undated) DEVICE — SU DERMABOND PRINEO 22CM CLR222US

## (undated) DEVICE — SOL BENZOIN 0.5OZ

## (undated) DEVICE — GLOVE PROTEXIS MICRO 8.0  2D73PM80

## (undated) DEVICE — PACK TOTAL KNEE SOP15TKFSD

## (undated) DEVICE — SU STRATAFIX PDS PLUS 0 CT 45CM SXPP1A406

## (undated) DEVICE — BONE CLEANING TIP INTERPULSE  0210-010-000

## (undated) DEVICE — ESU GROUND PAD UNIVERSAL W/O CORD

## (undated) DEVICE — PREP CHLORAPREP 26ML TINTED ORANGE  260815

## (undated) DEVICE — DRSG STERI STRIP 1/2X4" R1547

## (undated) DEVICE — CAST PADDING 6" UNSTERILE 9046

## (undated) DEVICE — MANIFOLD NEPTUNE 4 PORT 700-20

## (undated) DEVICE — GLOVE PROTEXIS W/NEU-THERA 7.5  2D73TE75

## (undated) DEVICE — DRAPE IOBAN INCISE 23X17" 6650EZ

## (undated) DEVICE — DRSG ADAPTIC 3X8" 6113

## (undated) DEVICE — SOL WATER IRRIG 1000ML BOTTLE 07139-09

## (undated) RX ORDER — ROPIVACAINE HYDROCHLORIDE 2 MG/ML
INJECTION, SOLUTION EPIDURAL; INFILTRATION; PERINEURAL
Status: DISPENSED
Start: 2018-01-30

## (undated) RX ORDER — KETOROLAC TROMETHAMINE 30 MG/ML
INJECTION, SOLUTION INTRAMUSCULAR; INTRAVENOUS
Status: DISPENSED
Start: 2018-01-30

## (undated) RX ORDER — FENTANYL CITRATE 50 UG/ML
INJECTION, SOLUTION INTRAMUSCULAR; INTRAVENOUS
Status: DISPENSED
Start: 2018-01-30

## (undated) RX ORDER — CEFAZOLIN SODIUM 1 G/3ML
INJECTION, POWDER, FOR SOLUTION INTRAMUSCULAR; INTRAVENOUS
Status: DISPENSED
Start: 2018-01-30

## (undated) RX ORDER — FENTANYL CITRATE 50 UG/ML
INJECTION, SOLUTION INTRAMUSCULAR; INTRAVENOUS
Status: DISPENSED
Start: 2022-05-06

## (undated) RX ORDER — FLUMAZENIL 0.1 MG/ML
INJECTION, SOLUTION INTRAVENOUS
Status: DISPENSED
Start: 2022-05-06

## (undated) RX ORDER — HYDROMORPHONE HYDROCHLORIDE 1 MG/ML
INJECTION, SOLUTION INTRAMUSCULAR; INTRAVENOUS; SUBCUTANEOUS
Status: DISPENSED
Start: 2018-01-30

## (undated) RX ORDER — GINSENG 100 MG
CAPSULE ORAL
Status: DISPENSED
Start: 2018-01-30

## (undated) RX ORDER — PROPOFOL 10 MG/ML
INJECTION, EMULSION INTRAVENOUS
Status: DISPENSED
Start: 2018-01-30

## (undated) RX ORDER — ONDANSETRON 2 MG/ML
INJECTION INTRAMUSCULAR; INTRAVENOUS
Status: DISPENSED
Start: 2018-01-30

## (undated) RX ORDER — LIDOCAINE HYDROCHLORIDE 20 MG/ML
INJECTION, SOLUTION EPIDURAL; INFILTRATION; INTRACAUDAL; PERINEURAL
Status: DISPENSED
Start: 2018-01-30

## (undated) RX ORDER — DEXAMETHASONE SODIUM PHOSPHATE 4 MG/ML
INJECTION, SOLUTION INTRA-ARTICULAR; INTRALESIONAL; INTRAMUSCULAR; INTRAVENOUS; SOFT TISSUE
Status: DISPENSED
Start: 2018-01-30

## (undated) RX ORDER — NALOXONE HYDROCHLORIDE 0.4 MG/ML
INJECTION, SOLUTION INTRAMUSCULAR; INTRAVENOUS; SUBCUTANEOUS
Status: DISPENSED
Start: 2022-05-06

## (undated) RX ORDER — ALBUMIN, HUMAN INJ 5% 5 %
SOLUTION INTRAVENOUS
Status: DISPENSED
Start: 2018-01-30

## (undated) RX ORDER — ACYCLOVIR 200 MG/1
CAPSULE ORAL
Status: DISPENSED
Start: 2018-01-30